# Patient Record
Sex: FEMALE | Race: WHITE | NOT HISPANIC OR LATINO | Employment: OTHER | URBAN - METROPOLITAN AREA
[De-identification: names, ages, dates, MRNs, and addresses within clinical notes are randomized per-mention and may not be internally consistent; named-entity substitution may affect disease eponyms.]

---

## 2017-01-17 ENCOUNTER — HOSPITAL ENCOUNTER (OUTPATIENT)
Dept: RADIOLOGY | Facility: HOSPITAL | Age: 72
Discharge: HOME | End: 2017-01-17
Payer: MEDICARE

## 2017-01-17 DIAGNOSIS — Z12.31 ENCOUNTER FOR SCREENING MAMMOGRAM FOR MALIGNANT NEOPLASM OF BREAST: ICD-10-CM

## 2017-01-17 PROCEDURE — G0202 SCR MAMMO BI INCL CAD: HCPCS

## 2017-01-31 ENCOUNTER — TRANSCRIBE ORDERS (OUTPATIENT)
Dept: ADMINISTRATIVE | Facility: HOSPITAL | Age: 72
End: 2017-01-31

## 2017-01-31 ENCOUNTER — LAB (OUTPATIENT)
Dept: LAB | Facility: HOSPITAL | Age: 72
End: 2017-01-31
Payer: MEDICARE

## 2017-01-31 DIAGNOSIS — I25.10 UNSPECIFIED CARDIOVASCULAR DISEASE: ICD-10-CM

## 2017-01-31 DIAGNOSIS — E78.5 HYPERLIPIDEMIA, UNSPECIFIED HYPERLIPIDEMIA TYPE: ICD-10-CM

## 2017-01-31 DIAGNOSIS — Z79.899 ENCOUNTER FOR LONG-TERM (CURRENT) USE OF OTHER MEDICATIONS: ICD-10-CM

## 2017-01-31 DIAGNOSIS — I25.10 UNSPECIFIED CARDIOVASCULAR DISEASE: Primary | ICD-10-CM

## 2017-01-31 LAB
ANION GAP SERPL CALCULATED.3IONS-SCNC: 7 MMOL/L (ref 4–13)
BUN SERPL-MCNC: 20 MG/DL (ref 5–25)
CALCIUM SERPL-MCNC: 8.6 MG/DL (ref 8.3–10.1)
CHLORIDE SERPL-SCNC: 105 MMOL/L (ref 100–108)
CHOLEST SERPL-MCNC: 121 MG/DL (ref 50–200)
CO2 SERPL-SCNC: 27 MMOL/L (ref 21–32)
CREAT SERPL-MCNC: 1.11 MG/DL (ref 0.6–1.3)
ERYTHROCYTE [DISTWIDTH] IN BLOOD BY AUTOMATED COUNT: 12.9 % (ref 11.6–15.1)
GFR SERPL CREATININE-BSD FRML MDRD: 48.5 ML/MIN/1.73SQ M
GLUCOSE SERPL-MCNC: 98 MG/DL (ref 65–140)
HCT VFR BLD AUTO: 45.5 % (ref 37–47)
HDLC SERPL-MCNC: 43 MG/DL (ref 40–60)
HGB BLD-MCNC: 14.8 G/DL (ref 12–16)
LDLC SERPL CALC-MCNC: 57 MG/DL (ref 0–100)
MCH RBC QN AUTO: 30.7 PG (ref 27–31)
MCHC RBC AUTO-ENTMCNC: 32.4 G/DL (ref 31.4–37.4)
MCV RBC AUTO: 95 FL (ref 82–98)
PLATELET # BLD AUTO: 180 THOUSANDS/UL (ref 130–400)
PMV BLD AUTO: 8.4 FL (ref 8.9–12.7)
POTASSIUM SERPL-SCNC: 4.1 MMOL/L (ref 3.5–5.3)
RBC # BLD AUTO: 4.8 MILLION/UL (ref 4.2–5.4)
SODIUM SERPL-SCNC: 139 MMOL/L (ref 136–145)
TRIGL SERPL-MCNC: 103 MG/DL
TSH SERPL DL<=0.05 MIU/L-ACNC: 3.4 UIU/ML (ref 0.36–3.74)
WBC # BLD AUTO: 5.4 THOUSAND/UL (ref 4.8–10.8)

## 2017-01-31 PROCEDURE — 80048 BASIC METABOLIC PNL TOTAL CA: CPT

## 2017-01-31 PROCEDURE — 84443 ASSAY THYROID STIM HORMONE: CPT

## 2017-01-31 PROCEDURE — 36415 COLL VENOUS BLD VENIPUNCTURE: CPT

## 2017-01-31 PROCEDURE — 85027 COMPLETE CBC AUTOMATED: CPT

## 2017-01-31 PROCEDURE — 80061 LIPID PANEL: CPT

## 2017-11-14 ENCOUNTER — APPOINTMENT (OUTPATIENT)
Dept: LAB | Facility: HOSPITAL | Age: 72
End: 2017-11-14
Attending: INTERNAL MEDICINE
Payer: MEDICARE

## 2017-11-14 ENCOUNTER — TRANSCRIBE ORDERS (OUTPATIENT)
Dept: ADMINISTRATIVE | Facility: HOSPITAL | Age: 72
End: 2017-11-14

## 2017-11-14 DIAGNOSIS — I25.10 DISEASE OF CARDIOVASCULAR SYSTEM: ICD-10-CM

## 2017-11-14 DIAGNOSIS — D64.9 ANEMIA, UNSPECIFIED TYPE: ICD-10-CM

## 2017-11-14 DIAGNOSIS — R35.0 URINARY FREQUENCY: ICD-10-CM

## 2017-11-14 DIAGNOSIS — Z79.899 ENCOUNTER FOR LONG-TERM (CURRENT) USE OF HIGH-RISK MEDICATION: ICD-10-CM

## 2017-11-14 DIAGNOSIS — E55.9 VITAMIN D DEFICIENCY DISEASE: ICD-10-CM

## 2017-11-14 DIAGNOSIS — I10 ESSENTIAL HYPERTENSION, MALIGNANT: Primary | ICD-10-CM

## 2017-11-14 DIAGNOSIS — I10 ESSENTIAL HYPERTENSION, MALIGNANT: ICD-10-CM

## 2017-11-14 DIAGNOSIS — E78.00 PURE HYPERCHOLESTEROLEMIA: ICD-10-CM

## 2017-11-14 LAB
25(OH)D3 SERPL-MCNC: 20.5 NG/ML (ref 30–100)
ALBUMIN SERPL BCP-MCNC: 3.4 G/DL (ref 3.5–5)
ALP SERPL-CCNC: 81 U/L (ref 46–116)
ALT SERPL W P-5'-P-CCNC: 23 U/L (ref 12–78)
ANION GAP SERPL CALCULATED.3IONS-SCNC: 6 MMOL/L (ref 4–13)
AST SERPL W P-5'-P-CCNC: 23 U/L (ref 5–45)
BACTERIA UR QL AUTO: ABNORMAL /HPF
BASOPHILS # BLD AUTO: 0 THOUSANDS/ΜL (ref 0–0.1)
BASOPHILS NFR BLD AUTO: 1 % (ref 0–1)
BILIRUB SERPL-MCNC: 0.5 MG/DL (ref 0.2–1)
BILIRUB UR QL STRIP: ABNORMAL
BUN SERPL-MCNC: 22 MG/DL (ref 5–25)
CALCIUM SERPL-MCNC: 8.7 MG/DL (ref 8.3–10.1)
CHLORIDE SERPL-SCNC: 101 MMOL/L (ref 100–108)
CHOLEST SERPL-MCNC: 147 MG/DL (ref 50–200)
CLARITY UR: ABNORMAL
CO2 SERPL-SCNC: 30 MMOL/L (ref 21–32)
COLOR UR: YELLOW
CREAT SERPL-MCNC: 1.44 MG/DL (ref 0.6–1.3)
EOSINOPHIL # BLD AUTO: 0.1 THOUSAND/ΜL (ref 0–0.61)
EOSINOPHIL NFR BLD AUTO: 2 % (ref 0–6)
ERYTHROCYTE [DISTWIDTH] IN BLOOD BY AUTOMATED COUNT: 13.7 % (ref 11.6–15.1)
ERYTHROCYTE [SEDIMENTATION RATE] IN BLOOD: 7 MM/HOUR (ref 2–25)
GFR SERPL CREATININE-BSD FRML MDRD: 36 ML/MIN/1.73SQ M
GLUCOSE P FAST SERPL-MCNC: 96 MG/DL (ref 65–99)
GLUCOSE UR STRIP-MCNC: NEGATIVE MG/DL
HCT VFR BLD AUTO: 46 % (ref 37–47)
HDLC SERPL-MCNC: 39 MG/DL (ref 40–60)
HGB BLD-MCNC: 15 G/DL (ref 12–16)
HGB UR QL STRIP.AUTO: ABNORMAL
HYALINE CASTS #/AREA URNS LPF: ABNORMAL /LPF
IRON SERPL-MCNC: 108 UG/DL (ref 50–170)
KETONES UR STRIP-MCNC: ABNORMAL MG/DL
LDLC SERPL CALC-MCNC: 80 MG/DL (ref 0–100)
LEUKOCYTE ESTERASE UR QL STRIP: ABNORMAL
LYMPHOCYTES # BLD AUTO: 1.1 THOUSANDS/ΜL (ref 0.6–4.47)
LYMPHOCYTES NFR BLD AUTO: 23 % (ref 14–44)
MAGNESIUM SERPL-MCNC: 2 MG/DL (ref 1.6–2.6)
MCH RBC QN AUTO: 32 PG (ref 27–31)
MCHC RBC AUTO-ENTMCNC: 32.6 G/DL (ref 31.4–37.4)
MCV RBC AUTO: 98 FL (ref 82–98)
MONOCYTES # BLD AUTO: 0.4 THOUSAND/ΜL (ref 0.17–1.22)
MONOCYTES NFR BLD AUTO: 8 % (ref 4–12)
MUCOUS THREADS UR QL AUTO: ABNORMAL
NEUTROPHILS # BLD AUTO: 3.2 THOUSANDS/ΜL (ref 1.85–7.62)
NEUTS SEG NFR BLD AUTO: 67 % (ref 43–75)
NITRITE UR QL STRIP: NEGATIVE
NON-SQ EPI CELLS URNS QL MICRO: ABNORMAL /HPF
NRBC BLD AUTO-RTO: 0 /100 WBCS
PH UR STRIP.AUTO: 5.5 [PH] (ref 5–9)
PLATELET # BLD AUTO: 166 THOUSANDS/UL (ref 130–400)
PMV BLD AUTO: 8.8 FL (ref 8.9–12.7)
POTASSIUM SERPL-SCNC: 3.6 MMOL/L (ref 3.5–5.3)
PROT SERPL-MCNC: 7.3 G/DL (ref 6.4–8.2)
PROT UR STRIP-MCNC: ABNORMAL MG/DL
RBC # BLD AUTO: 4.68 MILLION/UL (ref 4.2–5.4)
RBC #/AREA URNS AUTO: ABNORMAL /HPF
SODIUM SERPL-SCNC: 137 MMOL/L (ref 136–145)
SP GR UR STRIP.AUTO: 1.02 (ref 1–1.03)
TRIGL SERPL-MCNC: 140 MG/DL
TSH SERPL DL<=0.05 MIU/L-ACNC: 2.5 UIU/ML (ref 0.36–3.74)
URATE SERPL-MCNC: 7.1 MG/DL (ref 2–6.8)
UROBILINOGEN UR QL STRIP.AUTO: 1 E.U./DL
VIT B12 SERPL-MCNC: 458 PG/ML (ref 100–900)
WBC # BLD AUTO: 4.8 THOUSAND/UL (ref 4.8–10.8)
WBC #/AREA URNS AUTO: ABNORMAL /HPF

## 2017-11-14 PROCEDURE — 85652 RBC SED RATE AUTOMATED: CPT

## 2017-11-14 PROCEDURE — 85025 COMPLETE CBC W/AUTO DIFF WBC: CPT | Performed by: INTERNAL MEDICINE

## 2017-11-14 PROCEDURE — 36415 COLL VENOUS BLD VENIPUNCTURE: CPT | Performed by: INTERNAL MEDICINE

## 2017-11-14 PROCEDURE — 84550 ASSAY OF BLOOD/URIC ACID: CPT

## 2017-11-14 PROCEDURE — 82306 VITAMIN D 25 HYDROXY: CPT

## 2017-11-14 PROCEDURE — 87086 URINE CULTURE/COLONY COUNT: CPT

## 2017-11-14 PROCEDURE — 80061 LIPID PANEL: CPT | Performed by: INTERNAL MEDICINE

## 2017-11-14 PROCEDURE — 82607 VITAMIN B-12: CPT

## 2017-11-14 PROCEDURE — 80053 COMPREHEN METABOLIC PANEL: CPT | Performed by: INTERNAL MEDICINE

## 2017-11-14 PROCEDURE — 83735 ASSAY OF MAGNESIUM: CPT

## 2017-11-14 PROCEDURE — 83540 ASSAY OF IRON: CPT

## 2017-11-14 PROCEDURE — 84443 ASSAY THYROID STIM HORMONE: CPT

## 2017-11-14 PROCEDURE — 81001 URINALYSIS AUTO W/SCOPE: CPT | Performed by: INTERNAL MEDICINE

## 2017-11-15 LAB — BACTERIA UR CULT: NORMAL

## 2018-11-29 ENCOUNTER — APPOINTMENT (OUTPATIENT)
Dept: LAB | Facility: HOSPITAL | Age: 73
End: 2018-11-29
Attending: INTERNAL MEDICINE
Payer: MEDICARE

## 2018-11-29 ENCOUNTER — TRANSCRIBE ORDERS (OUTPATIENT)
Dept: ADMINISTRATIVE | Facility: HOSPITAL | Age: 73
End: 2018-11-29

## 2018-11-29 DIAGNOSIS — J44.9 OBSTRUCTIVE CHRONIC BRONCHITIS WITHOUT EXACERBATION (HCC): Primary | ICD-10-CM

## 2018-11-29 DIAGNOSIS — E78.5 HYPERLIPIDEMIA, UNSPECIFIED HYPERLIPIDEMIA TYPE: ICD-10-CM

## 2018-11-29 DIAGNOSIS — I10 ESSENTIAL HYPERTENSION, MALIGNANT: ICD-10-CM

## 2018-11-29 DIAGNOSIS — I25.10 ATHEROSCLEROSIS OF NATIVE CORONARY ARTERY OF NATIVE HEART WITHOUT ANGINA PECTORIS: ICD-10-CM

## 2018-11-29 DIAGNOSIS — J44.9 OBSTRUCTIVE CHRONIC BRONCHITIS WITHOUT EXACERBATION (HCC): ICD-10-CM

## 2018-11-29 LAB
25(OH)D3 SERPL-MCNC: 15.3 NG/ML (ref 30–100)
ALBUMIN SERPL BCP-MCNC: 3.4 G/DL (ref 3.5–5)
ALP SERPL-CCNC: 79 U/L (ref 46–116)
ALT SERPL W P-5'-P-CCNC: 24 U/L (ref 12–78)
ANION GAP SERPL CALCULATED.3IONS-SCNC: 7 MMOL/L (ref 4–13)
AST SERPL W P-5'-P-CCNC: 21 U/L (ref 5–45)
BASOPHILS # BLD AUTO: 0.04 THOUSANDS/ΜL (ref 0–0.1)
BASOPHILS NFR BLD AUTO: 1 % (ref 0–1)
BILIRUB SERPL-MCNC: 0.6 MG/DL (ref 0.2–1)
BUN SERPL-MCNC: 25 MG/DL (ref 5–25)
CALCIUM SERPL-MCNC: 9.2 MG/DL (ref 8.3–10.1)
CHLORIDE SERPL-SCNC: 104 MMOL/L (ref 100–108)
CHOLEST SERPL-MCNC: 155 MG/DL (ref 50–200)
CO2 SERPL-SCNC: 30 MMOL/L (ref 21–32)
CREAT SERPL-MCNC: 1.48 MG/DL (ref 0.6–1.3)
EOSINOPHIL # BLD AUTO: 0.16 THOUSAND/ΜL (ref 0–0.61)
EOSINOPHIL NFR BLD AUTO: 3 % (ref 0–6)
ERYTHROCYTE [DISTWIDTH] IN BLOOD BY AUTOMATED COUNT: 13.1 % (ref 11.6–15.1)
ERYTHROCYTE [SEDIMENTATION RATE] IN BLOOD: 7 MM/HOUR (ref 2–25)
GFR SERPL CREATININE-BSD FRML MDRD: 35 ML/MIN/1.73SQ M
GLUCOSE P FAST SERPL-MCNC: 98 MG/DL (ref 65–99)
HCT VFR BLD AUTO: 46.3 % (ref 34.8–46.1)
HDLC SERPL-MCNC: 42 MG/DL (ref 40–60)
HGB BLD-MCNC: 15.1 G/DL (ref 11.5–15.4)
IMM GRANULOCYTES # BLD AUTO: 0.02 THOUSAND/UL (ref 0–0.2)
IMM GRANULOCYTES NFR BLD AUTO: 0 % (ref 0–2)
IRON SERPL-MCNC: 137 UG/DL (ref 50–170)
LDLC SERPL CALC-MCNC: 79 MG/DL (ref 0–100)
LYMPHOCYTES # BLD AUTO: 1.19 THOUSANDS/ΜL (ref 0.6–4.47)
LYMPHOCYTES NFR BLD AUTO: 23 % (ref 14–44)
MAGNESIUM SERPL-MCNC: 2.1 MG/DL (ref 1.6–2.6)
MCH RBC QN AUTO: 31.9 PG (ref 26.8–34.3)
MCHC RBC AUTO-ENTMCNC: 32.6 G/DL (ref 31.4–37.4)
MCV RBC AUTO: 98 FL (ref 82–98)
MONOCYTES # BLD AUTO: 0.35 THOUSAND/ΜL (ref 0.17–1.22)
MONOCYTES NFR BLD AUTO: 7 % (ref 4–12)
NEUTROPHILS # BLD AUTO: 3.51 THOUSANDS/ΜL (ref 1.85–7.62)
NEUTS SEG NFR BLD AUTO: 66 % (ref 43–75)
NONHDLC SERPL-MCNC: 113 MG/DL
NRBC BLD AUTO-RTO: 0 /100 WBCS
PLATELET # BLD AUTO: 179 THOUSANDS/UL (ref 149–390)
PMV BLD AUTO: 10.9 FL (ref 8.9–12.7)
POTASSIUM SERPL-SCNC: 4 MMOL/L (ref 3.5–5.3)
PROT SERPL-MCNC: 7.3 G/DL (ref 6.4–8.2)
RBC # BLD AUTO: 4.74 MILLION/UL (ref 3.81–5.12)
SODIUM SERPL-SCNC: 141 MMOL/L (ref 136–145)
TRIGL SERPL-MCNC: 172 MG/DL
TSH SERPL DL<=0.05 MIU/L-ACNC: 2.25 UIU/ML (ref 0.36–3.74)
URATE SERPL-MCNC: 7.4 MG/DL (ref 2–6.8)
VIT B12 SERPL-MCNC: 426 PG/ML (ref 100–900)
WBC # BLD AUTO: 5.27 THOUSAND/UL (ref 4.31–10.16)

## 2018-11-29 PROCEDURE — 36415 COLL VENOUS BLD VENIPUNCTURE: CPT

## 2018-11-29 PROCEDURE — 83540 ASSAY OF IRON: CPT

## 2018-11-29 PROCEDURE — 84443 ASSAY THYROID STIM HORMONE: CPT

## 2018-11-29 PROCEDURE — 82306 VITAMIN D 25 HYDROXY: CPT

## 2018-11-29 PROCEDURE — 83735 ASSAY OF MAGNESIUM: CPT

## 2018-11-29 PROCEDURE — 80053 COMPREHEN METABOLIC PANEL: CPT

## 2018-11-29 PROCEDURE — 82607 VITAMIN B-12: CPT

## 2018-11-29 PROCEDURE — 84550 ASSAY OF BLOOD/URIC ACID: CPT

## 2018-11-29 PROCEDURE — 85025 COMPLETE CBC W/AUTO DIFF WBC: CPT

## 2018-11-29 PROCEDURE — 85652 RBC SED RATE AUTOMATED: CPT

## 2018-11-29 PROCEDURE — 80061 LIPID PANEL: CPT

## 2020-04-27 ENCOUNTER — HOSPITAL ENCOUNTER (OUTPATIENT)
Facility: HOSPITAL | Age: 75
Setting detail: OBSERVATION
Discharge: HOME | End: 2020-04-28
Attending: EMERGENCY MEDICINE | Admitting: INTERNAL MEDICINE
Payer: MEDICARE

## 2020-04-27 ENCOUNTER — APPOINTMENT (OUTPATIENT)
Dept: RADIOLOGY | Facility: HOSPITAL | Age: 75
End: 2020-04-27
Payer: MEDICARE

## 2020-04-27 ENCOUNTER — APPOINTMENT (EMERGENCY)
Dept: RADIOLOGY | Facility: HOSPITAL | Age: 75
End: 2020-04-27
Payer: MEDICARE

## 2020-04-27 DIAGNOSIS — N17.9 ACUTE KIDNEY INJURY (HCC): ICD-10-CM

## 2020-04-27 DIAGNOSIS — R68.84 JAW PAIN: Primary | ICD-10-CM

## 2020-04-27 PROBLEM — R11.0 NAUSEA: Status: ACTIVE | Noted: 2020-04-27

## 2020-04-27 LAB
ALBUMIN SERPL BCP-MCNC: 3.5 G/DL (ref 3.5–5)
ALP SERPL-CCNC: 63 U/L (ref 46–116)
ALT SERPL W P-5'-P-CCNC: 17 U/L (ref 12–78)
ANION GAP SERPL CALCULATED.3IONS-SCNC: 9 MMOL/L (ref 4–13)
APTT PPP: 27 SECONDS (ref 23–37)
AST SERPL W P-5'-P-CCNC: 20 U/L (ref 5–45)
BASOPHILS # BLD AUTO: 0.03 THOUSANDS/ΜL (ref 0–0.1)
BASOPHILS NFR BLD AUTO: 1 % (ref 0–1)
BILIRUB SERPL-MCNC: 0.4 MG/DL (ref 0.2–1)
BUN SERPL-MCNC: 36 MG/DL (ref 5–25)
CALCIUM SERPL-MCNC: 9.2 MG/DL (ref 8.3–10.1)
CHLORIDE SERPL-SCNC: 103 MMOL/L (ref 100–108)
CO2 SERPL-SCNC: 27 MMOL/L (ref 21–32)
CREAT SERPL-MCNC: 2.06 MG/DL (ref 0.6–1.3)
EOSINOPHIL # BLD AUTO: 0.12 THOUSAND/ΜL (ref 0–0.61)
EOSINOPHIL NFR BLD AUTO: 3 % (ref 0–6)
ERYTHROCYTE [DISTWIDTH] IN BLOOD BY AUTOMATED COUNT: 13.2 % (ref 11.6–15.1)
GFR SERPL CREATININE-BSD FRML MDRD: 23 ML/MIN/1.73SQ M
GLUCOSE SERPL-MCNC: 112 MG/DL (ref 65–140)
HCT VFR BLD AUTO: 43.5 % (ref 34.8–46.1)
HGB BLD-MCNC: 14.3 G/DL (ref 11.5–15.4)
IMM GRANULOCYTES # BLD AUTO: 0.01 THOUSAND/UL (ref 0–0.2)
IMM GRANULOCYTES NFR BLD AUTO: 0 % (ref 0–2)
INR PPP: 1 (ref 0.84–1.19)
LIPASE SERPL-CCNC: 214 U/L (ref 73–393)
LYMPHOCYTES # BLD AUTO: 0.93 THOUSANDS/ΜL (ref 0.6–4.47)
LYMPHOCYTES NFR BLD AUTO: 19 % (ref 14–44)
MCH RBC QN AUTO: 32 PG (ref 26.8–34.3)
MCHC RBC AUTO-ENTMCNC: 32.9 G/DL (ref 31.4–37.4)
MCV RBC AUTO: 97 FL (ref 82–98)
MONOCYTES # BLD AUTO: 0.35 THOUSAND/ΜL (ref 0.17–1.22)
MONOCYTES NFR BLD AUTO: 7 % (ref 4–12)
NEUTROPHILS # BLD AUTO: 3.41 THOUSANDS/ΜL (ref 1.85–7.62)
NEUTS SEG NFR BLD AUTO: 70 % (ref 43–75)
NRBC BLD AUTO-RTO: 0 /100 WBCS
PLATELET # BLD AUTO: 158 THOUSANDS/UL (ref 149–390)
PLATELET # BLD AUTO: 171 THOUSANDS/UL (ref 149–390)
PMV BLD AUTO: 10.4 FL (ref 8.9–12.7)
PMV BLD AUTO: 10.4 FL (ref 8.9–12.7)
POTASSIUM SERPL-SCNC: 3.7 MMOL/L (ref 3.5–5.3)
PROT SERPL-MCNC: 6.7 G/DL (ref 6.4–8.2)
PROTHROMBIN TIME: 13.5 SECONDS (ref 11.6–14.5)
RBC # BLD AUTO: 4.47 MILLION/UL (ref 3.81–5.12)
SODIUM SERPL-SCNC: 139 MMOL/L (ref 136–145)
TROPONIN I SERPL-MCNC: <0.02 NG/ML
WBC # BLD AUTO: 4.85 THOUSAND/UL (ref 4.31–10.16)

## 2020-04-27 PROCEDURE — 81001 URINALYSIS AUTO W/SCOPE: CPT | Performed by: INTERNAL MEDICINE

## 2020-04-27 PROCEDURE — 85049 AUTOMATED PLATELET COUNT: CPT | Performed by: INTERNAL MEDICINE

## 2020-04-27 PROCEDURE — 84484 ASSAY OF TROPONIN QUANT: CPT | Performed by: PHYSICIAN ASSISTANT

## 2020-04-27 PROCEDURE — 99285 EMERGENCY DEPT VISIT HI MDM: CPT

## 2020-04-27 PROCEDURE — 1124F ACP DISCUSS-NO DSCNMKR DOCD: CPT | Performed by: INTERNAL MEDICINE

## 2020-04-27 PROCEDURE — 99220 PR INITIAL OBSERVATION CARE/DAY 70 MINUTES: CPT | Performed by: INTERNAL MEDICINE

## 2020-04-27 PROCEDURE — 85730 THROMBOPLASTIN TIME PARTIAL: CPT | Performed by: PHYSICIAN ASSISTANT

## 2020-04-27 PROCEDURE — 93005 ELECTROCARDIOGRAM TRACING: CPT

## 2020-04-27 PROCEDURE — 36415 COLL VENOUS BLD VENIPUNCTURE: CPT | Performed by: PHYSICIAN ASSISTANT

## 2020-04-27 PROCEDURE — 96360 HYDRATION IV INFUSION INIT: CPT

## 2020-04-27 PROCEDURE — 84484 ASSAY OF TROPONIN QUANT: CPT | Performed by: INTERNAL MEDICINE

## 2020-04-27 PROCEDURE — 85025 COMPLETE CBC W/AUTO DIFF WBC: CPT | Performed by: PHYSICIAN ASSISTANT

## 2020-04-27 PROCEDURE — 99285 EMERGENCY DEPT VISIT HI MDM: CPT | Performed by: PHYSICIAN ASSISTANT

## 2020-04-27 PROCEDURE — 71045 X-RAY EXAM CHEST 1 VIEW: CPT

## 2020-04-27 PROCEDURE — 70450 CT HEAD/BRAIN W/O DYE: CPT

## 2020-04-27 PROCEDURE — 85610 PROTHROMBIN TIME: CPT | Performed by: PHYSICIAN ASSISTANT

## 2020-04-27 PROCEDURE — 80053 COMPREHEN METABOLIC PANEL: CPT | Performed by: PHYSICIAN ASSISTANT

## 2020-04-27 PROCEDURE — 83690 ASSAY OF LIPASE: CPT | Performed by: PHYSICIAN ASSISTANT

## 2020-04-27 RX ORDER — ASPIRIN 81 MG/1
162 TABLET, CHEWABLE ORAL DAILY
Status: DISCONTINUED | OUTPATIENT
Start: 2020-04-28 | End: 2020-04-28 | Stop reason: HOSPADM

## 2020-04-27 RX ORDER — ASPIRIN 81 MG/1
162 TABLET, CHEWABLE ORAL DAILY
COMMUNITY

## 2020-04-27 RX ORDER — ATORVASTATIN CALCIUM 10 MG/1
10 TABLET, FILM COATED ORAL
Status: DISCONTINUED | OUTPATIENT
Start: 2020-04-27 | End: 2020-04-28 | Stop reason: HOSPADM

## 2020-04-27 RX ORDER — CLOPIDOGREL BISULFATE 75 MG/1
75 TABLET ORAL DAILY
Status: DISCONTINUED | OUTPATIENT
Start: 2020-04-28 | End: 2020-04-28 | Stop reason: HOSPADM

## 2020-04-27 RX ORDER — SODIUM CHLORIDE 9 MG/ML
75 INJECTION, SOLUTION INTRAVENOUS CONTINUOUS
Status: DISCONTINUED | OUTPATIENT
Start: 2020-04-27 | End: 2020-04-28 | Stop reason: HOSPADM

## 2020-04-27 RX ORDER — ONDANSETRON 2 MG/ML
4 INJECTION INTRAMUSCULAR; INTRAVENOUS EVERY 6 HOURS PRN
Status: DISCONTINUED | OUTPATIENT
Start: 2020-04-27 | End: 2020-04-28 | Stop reason: HOSPADM

## 2020-04-27 RX ORDER — HEPARIN SODIUM 5000 [USP'U]/ML
5000 INJECTION, SOLUTION INTRAVENOUS; SUBCUTANEOUS EVERY 8 HOURS SCHEDULED
Status: DISCONTINUED | OUTPATIENT
Start: 2020-04-27 | End: 2020-04-28 | Stop reason: HOSPADM

## 2020-04-27 RX ORDER — CLOPIDOGREL BISULFATE 75 MG/1
75 TABLET ORAL DAILY
COMMUNITY
End: 2021-05-07

## 2020-04-27 RX ORDER — ACETAMINOPHEN 325 MG/1
650 TABLET ORAL EVERY 6 HOURS PRN
Status: DISCONTINUED | OUTPATIENT
Start: 2020-04-27 | End: 2020-04-28 | Stop reason: HOSPADM

## 2020-04-27 RX ORDER — PANTOPRAZOLE SODIUM 40 MG/1
40 TABLET, DELAYED RELEASE ORAL
Status: DISCONTINUED | OUTPATIENT
Start: 2020-04-28 | End: 2020-04-27

## 2020-04-27 RX ORDER — ATORVASTATIN CALCIUM 10 MG/1
10 TABLET, FILM COATED ORAL DAILY
COMMUNITY

## 2020-04-27 RX ORDER — LISINOPRIL AND HYDROCHLOROTHIAZIDE 20; 12.5 MG/1; MG/1
1 TABLET ORAL DAILY
COMMUNITY
End: 2021-05-07

## 2020-04-27 RX ORDER — PANTOPRAZOLE SODIUM 40 MG/1
40 TABLET, DELAYED RELEASE ORAL
Status: DISCONTINUED | OUTPATIENT
Start: 2020-04-27 | End: 2020-04-28 | Stop reason: HOSPADM

## 2020-04-27 RX ADMIN — HEPARIN SODIUM 5000 UNITS: 5000 INJECTION INTRAVENOUS; SUBCUTANEOUS at 23:31

## 2020-04-27 RX ADMIN — ATORVASTATIN CALCIUM 10 MG: 10 TABLET, FILM COATED ORAL at 18:09

## 2020-04-27 RX ADMIN — PANTOPRAZOLE SODIUM 40 MG: 40 TABLET, DELAYED RELEASE ORAL at 18:09

## 2020-04-27 RX ADMIN — SODIUM CHLORIDE 500 ML: 0.9 INJECTION, SOLUTION INTRAVENOUS at 10:31

## 2020-04-27 RX ADMIN — METOPROLOL TARTRATE 25 MG: 25 TABLET, FILM COATED ORAL at 18:09

## 2020-04-27 RX ADMIN — SODIUM CHLORIDE 75 ML/HR: 0.9 INJECTION, SOLUTION INTRAVENOUS at 15:24

## 2020-04-28 ENCOUNTER — APPOINTMENT (OUTPATIENT)
Dept: RADIOLOGY | Facility: HOSPITAL | Age: 75
End: 2020-04-28
Payer: MEDICARE

## 2020-04-28 VITALS
WEIGHT: 164 LBS | DIASTOLIC BLOOD PRESSURE: 55 MMHG | RESPIRATION RATE: 18 BRPM | BODY MASS INDEX: 24.86 KG/M2 | TEMPERATURE: 97.5 F | HEIGHT: 68 IN | OXYGEN SATURATION: 98 % | HEART RATE: 75 BPM | SYSTOLIC BLOOD PRESSURE: 106 MMHG

## 2020-04-28 PROBLEM — R68.84 JAW PAIN: Status: RESOLVED | Noted: 2020-04-27 | Resolved: 2020-04-28

## 2020-04-28 LAB
ANION GAP SERPL CALCULATED.3IONS-SCNC: 10 MMOL/L (ref 4–13)
BACTERIA UR QL AUTO: ABNORMAL /HPF
BILIRUB UR QL STRIP: NEGATIVE
BUN SERPL-MCNC: 32 MG/DL (ref 5–25)
CALCIUM SERPL-MCNC: 8.9 MG/DL (ref 8.3–10.1)
CHLORIDE SERPL-SCNC: 107 MMOL/L (ref 100–108)
CLARITY UR: CLEAR
CO2 SERPL-SCNC: 26 MMOL/L (ref 21–32)
COLOR UR: YELLOW
CREAT SERPL-MCNC: 1.76 MG/DL (ref 0.6–1.3)
ERYTHROCYTE [DISTWIDTH] IN BLOOD BY AUTOMATED COUNT: 13.4 % (ref 11.6–15.1)
GFR SERPL CREATININE-BSD FRML MDRD: 28 ML/MIN/1.73SQ M
GLUCOSE P FAST SERPL-MCNC: 75 MG/DL (ref 65–99)
GLUCOSE SERPL-MCNC: 75 MG/DL (ref 65–140)
GLUCOSE UR STRIP-MCNC: NEGATIVE MG/DL
HCT VFR BLD AUTO: 41.6 % (ref 34.8–46.1)
HGB BLD-MCNC: 13.6 G/DL (ref 11.5–15.4)
HGB UR QL STRIP.AUTO: ABNORMAL
KETONES UR STRIP-MCNC: NEGATIVE MG/DL
LEUKOCYTE ESTERASE UR QL STRIP: NEGATIVE
MCH RBC QN AUTO: 32.3 PG (ref 26.8–34.3)
MCHC RBC AUTO-ENTMCNC: 32.7 G/DL (ref 31.4–37.4)
MCV RBC AUTO: 99 FL (ref 82–98)
NITRITE UR QL STRIP: NEGATIVE
NON-SQ EPI CELLS URNS QL MICRO: ABNORMAL /HPF
PH UR STRIP.AUTO: 5.5 [PH]
PLATELET # BLD AUTO: 162 THOUSANDS/UL (ref 149–390)
PMV BLD AUTO: 10.8 FL (ref 8.9–12.7)
POTASSIUM SERPL-SCNC: 3.9 MMOL/L (ref 3.5–5.3)
PROT UR STRIP-MCNC: NEGATIVE MG/DL
RBC # BLD AUTO: 4.21 MILLION/UL (ref 3.81–5.12)
RBC #/AREA URNS AUTO: ABNORMAL /HPF
SODIUM SERPL-SCNC: 143 MMOL/L (ref 136–145)
SP GR UR STRIP.AUTO: 1.02 (ref 1–1.03)
TSH SERPL DL<=0.05 MIU/L-ACNC: 1.67 UIU/ML (ref 0.36–3.74)
UROBILINOGEN UR QL STRIP.AUTO: 0.2 E.U./DL
VIT B12 SERPL-MCNC: 390 PG/ML (ref 100–900)
WBC # BLD AUTO: 5.33 THOUSAND/UL (ref 4.31–10.16)
WBC #/AREA URNS AUTO: ABNORMAL /HPF

## 2020-04-28 PROCEDURE — 84443 ASSAY THYROID STIM HORMONE: CPT | Performed by: INTERNAL MEDICINE

## 2020-04-28 PROCEDURE — 97161 PT EVAL LOW COMPLEX 20 MIN: CPT

## 2020-04-28 PROCEDURE — 80048 BASIC METABOLIC PNL TOTAL CA: CPT | Performed by: INTERNAL MEDICINE

## 2020-04-28 PROCEDURE — 82607 VITAMIN B-12: CPT | Performed by: INTERNAL MEDICINE

## 2020-04-28 PROCEDURE — 99217 PR OBSERVATION CARE DISCHARGE MANAGEMENT: CPT | Performed by: STUDENT IN AN ORGANIZED HEALTH CARE EDUCATION/TRAINING PROGRAM

## 2020-04-28 PROCEDURE — 93880 EXTRACRANIAL BILAT STUDY: CPT

## 2020-04-28 PROCEDURE — 93880 EXTRACRANIAL BILAT STUDY: CPT | Performed by: SURGERY

## 2020-04-28 PROCEDURE — 76770 US EXAM ABDO BACK WALL COMP: CPT

## 2020-04-28 PROCEDURE — 85027 COMPLETE CBC AUTOMATED: CPT | Performed by: INTERNAL MEDICINE

## 2020-04-28 RX ADMIN — CLOPIDOGREL BISULFATE 75 MG: 75 TABLET ORAL at 08:58

## 2020-04-28 RX ADMIN — SODIUM CHLORIDE 75 ML/HR: 0.9 INJECTION, SOLUTION INTRAVENOUS at 04:22

## 2020-04-28 RX ADMIN — ASPIRIN 81 MG 162 MG: 81 TABLET ORAL at 08:58

## 2020-04-28 RX ADMIN — PANTOPRAZOLE SODIUM 40 MG: 40 TABLET, DELAYED RELEASE ORAL at 05:38

## 2020-04-28 RX ADMIN — HEPARIN SODIUM 5000 UNITS: 5000 INJECTION INTRAVENOUS; SUBCUTANEOUS at 14:15

## 2020-04-28 RX ADMIN — HEPARIN SODIUM 5000 UNITS: 5000 INJECTION INTRAVENOUS; SUBCUTANEOUS at 05:38

## 2020-04-28 RX ADMIN — METOPROLOL TARTRATE 25 MG: 25 TABLET, FILM COATED ORAL at 08:58

## 2020-04-29 ENCOUNTER — TELEMEDICINE (OUTPATIENT)
Dept: GASTROENTEROLOGY | Facility: CLINIC | Age: 75
End: 2020-04-29
Payer: MEDICARE

## 2020-04-29 DIAGNOSIS — R11.0 NAUSEA: ICD-10-CM

## 2020-04-29 DIAGNOSIS — R10.13 DYSPEPSIA: Primary | ICD-10-CM

## 2020-04-29 DIAGNOSIS — R14.2 BELCHING: ICD-10-CM

## 2020-04-29 DIAGNOSIS — Z12.11 COLON CANCER SCREENING: ICD-10-CM

## 2020-04-29 LAB
ATRIAL RATE: 62 BPM
P AXIS: 64 DEGREES
PR INTERVAL: 156 MS
QRS AXIS: 46 DEGREES
QRSD INTERVAL: 92 MS
QT INTERVAL: 444 MS
QTC INTERVAL: 450 MS
T WAVE AXIS: 143 DEGREES
VENTRICULAR RATE: 62 BPM

## 2020-04-29 PROCEDURE — 93010 ELECTROCARDIOGRAM REPORT: CPT | Performed by: INTERNAL MEDICINE

## 2020-04-29 PROCEDURE — 99202 OFFICE O/P NEW SF 15 MIN: CPT | Performed by: INTERNAL MEDICINE

## 2020-04-29 RX ORDER — PANTOPRAZOLE SODIUM 40 MG/1
40 TABLET, DELAYED RELEASE ORAL DAILY
Qty: 90 TABLET | Refills: 0 | Status: SHIPPED | OUTPATIENT
Start: 2020-04-29 | End: 2020-07-26 | Stop reason: SDUPTHER

## 2020-04-30 ENCOUNTER — TELEPHONE (OUTPATIENT)
Dept: GASTROENTEROLOGY | Facility: CLINIC | Age: 75
End: 2020-04-30

## 2020-05-22 ENCOUNTER — TELEPHONE (OUTPATIENT)
Dept: GASTROENTEROLOGY | Facility: AMBULARY SURGERY CENTER | Age: 75
End: 2020-05-22

## 2020-07-26 DIAGNOSIS — R14.2 BELCHING: ICD-10-CM

## 2020-07-27 ENCOUNTER — TRANSCRIBE ORDERS (OUTPATIENT)
Dept: ADMINISTRATIVE | Facility: HOSPITAL | Age: 75
End: 2020-07-27

## 2020-07-27 DIAGNOSIS — I73.9 PERIPHERAL VASCULAR DISEASE, UNSPECIFIED (HCC): Primary | ICD-10-CM

## 2020-07-27 DIAGNOSIS — I25.10 ATHEROSCLEROSIS OF NATIVE CORONARY ARTERY WITHOUT ANGINA PECTORIS, UNSPECIFIED WHETHER NATIVE OR TRANSPLANTED HEART: ICD-10-CM

## 2020-07-27 DIAGNOSIS — K21.9 GASTROESOPHAGEAL REFLUX DISEASE WITHOUT ESOPHAGITIS: ICD-10-CM

## 2020-07-27 DIAGNOSIS — Z00.00 ROUTINE GENERAL MEDICAL EXAMINATION AT A HEALTH CARE FACILITY: ICD-10-CM

## 2020-07-27 DIAGNOSIS — E78.5 HYPERLIPIDEMIA, UNSPECIFIED HYPERLIPIDEMIA TYPE: ICD-10-CM

## 2020-07-27 DIAGNOSIS — I10 ESSENTIAL HYPERTENSION, MALIGNANT: ICD-10-CM

## 2020-07-27 RX ORDER — PANTOPRAZOLE SODIUM 40 MG/1
40 TABLET, DELAYED RELEASE ORAL DAILY
Qty: 90 TABLET | Refills: 0 | Status: SHIPPED | OUTPATIENT
Start: 2020-07-27 | End: 2020-10-26 | Stop reason: SDUPTHER

## 2020-08-04 ENCOUNTER — HOSPITAL ENCOUNTER (OUTPATIENT)
Dept: RADIOLOGY | Facility: HOSPITAL | Age: 75
Discharge: HOME | End: 2020-08-04
Attending: INTERNAL MEDICINE
Payer: MEDICARE

## 2020-08-04 ENCOUNTER — APPOINTMENT (OUTPATIENT)
Dept: LAB | Facility: HOSPITAL | Age: 75
End: 2020-08-04
Attending: INTERNAL MEDICINE
Payer: MEDICARE

## 2020-08-04 DIAGNOSIS — Z00.00 ROUTINE GENERAL MEDICAL EXAMINATION AT A HEALTH CARE FACILITY: ICD-10-CM

## 2020-08-04 DIAGNOSIS — I73.9 PERIPHERAL VASCULAR DISEASE, UNSPECIFIED (HCC): ICD-10-CM

## 2020-08-04 DIAGNOSIS — I10 ESSENTIAL HYPERTENSION, MALIGNANT: ICD-10-CM

## 2020-08-04 DIAGNOSIS — E78.5 HYPERLIPIDEMIA, UNSPECIFIED HYPERLIPIDEMIA TYPE: ICD-10-CM

## 2020-08-04 DIAGNOSIS — I25.10 ATHEROSCLEROSIS OF NATIVE CORONARY ARTERY WITHOUT ANGINA PECTORIS, UNSPECIFIED WHETHER NATIVE OR TRANSPLANTED HEART: ICD-10-CM

## 2020-08-04 DIAGNOSIS — K21.9 GASTROESOPHAGEAL REFLUX DISEASE WITHOUT ESOPHAGITIS: ICD-10-CM

## 2020-08-04 LAB
ALBUMIN SERPL BCP-MCNC: 3.4 G/DL (ref 3.5–5)
ALP SERPL-CCNC: 63 U/L (ref 46–116)
ALT SERPL W P-5'-P-CCNC: 20 U/L (ref 12–78)
ANION GAP SERPL CALCULATED.3IONS-SCNC: 7 MMOL/L (ref 4–13)
AST SERPL W P-5'-P-CCNC: 22 U/L (ref 5–45)
BACTERIA UR QL AUTO: ABNORMAL /HPF
BASOPHILS # BLD AUTO: 0.05 THOUSANDS/ΜL (ref 0–0.1)
BASOPHILS NFR BLD AUTO: 1 % (ref 0–1)
BILIRUB SERPL-MCNC: 0.5 MG/DL (ref 0.2–1)
BILIRUB UR QL STRIP: ABNORMAL
BUN SERPL-MCNC: 33 MG/DL (ref 5–25)
CALCIUM SERPL-MCNC: 8.9 MG/DL (ref 8.3–10.1)
CHLORIDE SERPL-SCNC: 104 MMOL/L (ref 100–108)
CHOLEST SERPL-MCNC: 129 MG/DL (ref 50–200)
CLARITY UR: ABNORMAL
CO2 SERPL-SCNC: 28 MMOL/L (ref 21–32)
COLOR UR: ABNORMAL
CREAT SERPL-MCNC: 2.1 MG/DL (ref 0.6–1.3)
EOSINOPHIL # BLD AUTO: 0.1 THOUSAND/ΜL (ref 0–0.61)
EOSINOPHIL NFR BLD AUTO: 2 % (ref 0–6)
ERYTHROCYTE [DISTWIDTH] IN BLOOD BY AUTOMATED COUNT: 13.4 % (ref 11.6–15.1)
ERYTHROCYTE [SEDIMENTATION RATE] IN BLOOD: 2 MM/HOUR (ref 0–29)
GFR SERPL CREATININE-BSD FRML MDRD: 23 ML/MIN/1.73SQ M
GLUCOSE P FAST SERPL-MCNC: 89 MG/DL (ref 65–99)
GLUCOSE UR STRIP-MCNC: NEGATIVE MG/DL
HCT VFR BLD AUTO: 41.2 % (ref 34.8–46.1)
HDLC SERPL-MCNC: 41 MG/DL
HGB BLD-MCNC: 13.4 G/DL (ref 11.5–15.4)
HGB UR QL STRIP.AUTO: NEGATIVE
HYALINE CASTS #/AREA URNS LPF: ABNORMAL /LPF
IMM GRANULOCYTES # BLD AUTO: 0.01 THOUSAND/UL (ref 0–0.2)
IMM GRANULOCYTES NFR BLD AUTO: 0 % (ref 0–2)
IRON SERPL-MCNC: 122 UG/DL (ref 50–170)
KETONES UR STRIP-MCNC: ABNORMAL MG/DL
LDLC SERPL CALC-MCNC: 63 MG/DL (ref 0–100)
LEUKOCYTE ESTERASE UR QL STRIP: ABNORMAL
LYMPHOCYTES # BLD AUTO: 1.23 THOUSANDS/ΜL (ref 0.6–4.47)
LYMPHOCYTES NFR BLD AUTO: 24 % (ref 14–44)
MAGNESIUM SERPL-MCNC: 2 MG/DL (ref 1.6–2.6)
MCH RBC QN AUTO: 32.6 PG (ref 26.8–34.3)
MCHC RBC AUTO-ENTMCNC: 32.5 G/DL (ref 31.4–37.4)
MCV RBC AUTO: 100 FL (ref 82–98)
MONOCYTES # BLD AUTO: 0.35 THOUSAND/ΜL (ref 0.17–1.22)
MONOCYTES NFR BLD AUTO: 7 % (ref 4–12)
NEUTROPHILS # BLD AUTO: 3.36 THOUSANDS/ΜL (ref 1.85–7.62)
NEUTS SEG NFR BLD AUTO: 66 % (ref 43–75)
NITRITE UR QL STRIP: NEGATIVE
NON-SQ EPI CELLS URNS QL MICRO: ABNORMAL /HPF
NONHDLC SERPL-MCNC: 88 MG/DL
NRBC BLD AUTO-RTO: 0 /100 WBCS
NT-PROBNP SERPL-MCNC: 4369 PG/ML
PH UR STRIP.AUTO: 5.5 [PH]
PLATELET # BLD AUTO: 152 THOUSANDS/UL (ref 149–390)
PMV BLD AUTO: 11.7 FL (ref 8.9–12.7)
POTASSIUM SERPL-SCNC: 4 MMOL/L (ref 3.5–5.3)
PROT SERPL-MCNC: 6.7 G/DL (ref 6.4–8.2)
PROT UR STRIP-MCNC: ABNORMAL MG/DL
RBC # BLD AUTO: 4.11 MILLION/UL (ref 3.81–5.12)
RBC #/AREA URNS AUTO: ABNORMAL /HPF
SODIUM SERPL-SCNC: 139 MMOL/L (ref 136–145)
SP GR UR STRIP.AUTO: 1.02 (ref 1–1.03)
TRIGL SERPL-MCNC: 124 MG/DL
TSH SERPL DL<=0.05 MIU/L-ACNC: 2.69 UIU/ML (ref 0.36–3.74)
URATE SERPL-MCNC: 9.8 MG/DL (ref 2–6.8)
UROBILINOGEN UR QL STRIP.AUTO: 0.2 E.U./DL
VIT B12 SERPL-MCNC: 412 PG/ML (ref 100–900)
WBC # BLD AUTO: 5.1 THOUSAND/UL (ref 4.31–10.16)
WBC #/AREA URNS AUTO: ABNORMAL /HPF

## 2020-08-04 PROCEDURE — 93922 UPR/L XTREMITY ART 2 LEVELS: CPT

## 2020-08-04 PROCEDURE — 81001 URINALYSIS AUTO W/SCOPE: CPT

## 2020-08-04 PROCEDURE — 85025 COMPLETE CBC W/AUTO DIFF WBC: CPT

## 2020-08-04 PROCEDURE — 83880 ASSAY OF NATRIURETIC PEPTIDE: CPT

## 2020-08-04 PROCEDURE — 36415 COLL VENOUS BLD VENIPUNCTURE: CPT

## 2020-08-04 PROCEDURE — 85652 RBC SED RATE AUTOMATED: CPT

## 2020-08-04 PROCEDURE — 80061 LIPID PANEL: CPT

## 2020-08-04 PROCEDURE — 84550 ASSAY OF BLOOD/URIC ACID: CPT

## 2020-08-04 PROCEDURE — 80053 COMPREHEN METABOLIC PANEL: CPT

## 2020-08-04 PROCEDURE — 83735 ASSAY OF MAGNESIUM: CPT

## 2020-08-04 PROCEDURE — 84443 ASSAY THYROID STIM HORMONE: CPT

## 2020-08-04 PROCEDURE — 82607 VITAMIN B-12: CPT

## 2020-08-04 PROCEDURE — 83540 ASSAY OF IRON: CPT

## 2020-08-05 PROCEDURE — 93922 UPR/L XTREMITY ART 2 LEVELS: CPT | Performed by: SURGERY

## 2020-10-26 DIAGNOSIS — R14.2 BELCHING: ICD-10-CM

## 2020-10-26 RX ORDER — PANTOPRAZOLE SODIUM 40 MG/1
40 TABLET, DELAYED RELEASE ORAL DAILY
Qty: 90 TABLET | Refills: 0 | Status: SHIPPED | OUTPATIENT
Start: 2020-10-26 | End: 2021-05-07

## 2020-11-06 ENCOUNTER — LAB (OUTPATIENT)
Dept: LAB | Facility: CLINIC | Age: 75
End: 2020-11-06
Payer: MEDICARE

## 2020-11-06 ENCOUNTER — TRANSCRIBE ORDERS (OUTPATIENT)
Dept: ADMINISTRATIVE | Facility: HOSPITAL | Age: 75
End: 2020-11-06

## 2020-11-06 ENCOUNTER — TRANSCRIBE ORDERS (OUTPATIENT)
Dept: LAB | Facility: CLINIC | Age: 75
End: 2020-11-06

## 2020-11-06 DIAGNOSIS — R63.4 LOSS OF WEIGHT: ICD-10-CM

## 2020-11-06 DIAGNOSIS — R00.2 PALPITATIONS: Primary | ICD-10-CM

## 2020-11-06 DIAGNOSIS — R00.2 PALPITATIONS: ICD-10-CM

## 2020-11-06 DIAGNOSIS — R41.3 MEMORY LOSS: ICD-10-CM

## 2020-11-06 DIAGNOSIS — R07.9 CHEST PAIN, UNSPECIFIED TYPE: ICD-10-CM

## 2020-11-06 DIAGNOSIS — R63.4 ABNORMAL WEIGHT LOSS: ICD-10-CM

## 2020-11-06 DIAGNOSIS — F17.200 TOBACCO USE DISORDER: ICD-10-CM

## 2020-11-06 DIAGNOSIS — R06.02 SHORTNESS OF BREATH: ICD-10-CM

## 2020-11-06 DIAGNOSIS — I10 ESSENTIAL HYPERTENSION, MALIGNANT: ICD-10-CM

## 2020-11-06 DIAGNOSIS — R06.02 SOB (SHORTNESS OF BREATH): Primary | ICD-10-CM

## 2020-11-06 DIAGNOSIS — N18.30 STAGE 3 CHRONIC KIDNEY DISEASE, UNSPECIFIED WHETHER STAGE 3A OR 3B CKD (HCC): ICD-10-CM

## 2020-11-06 DIAGNOSIS — R06.02 SOB (SHORTNESS OF BREATH): ICD-10-CM

## 2020-11-06 LAB
ALBUMIN SERPL BCP-MCNC: 3.9 G/DL (ref 3.5–5)
ALP SERPL-CCNC: 73 U/L (ref 46–116)
ALT SERPL W P-5'-P-CCNC: 17 U/L (ref 12–78)
ANION GAP SERPL CALCULATED.3IONS-SCNC: 3 MMOL/L (ref 4–13)
AST SERPL W P-5'-P-CCNC: 20 U/L (ref 5–45)
BASOPHILS # BLD AUTO: 0.06 THOUSANDS/ΜL (ref 0–0.1)
BASOPHILS NFR BLD AUTO: 1 % (ref 0–1)
BILIRUB SERPL-MCNC: 0.45 MG/DL (ref 0.2–1)
BUN SERPL-MCNC: 27 MG/DL (ref 5–25)
CALCIUM SERPL-MCNC: 9.7 MG/DL (ref 8.3–10.1)
CHLORIDE SERPL-SCNC: 108 MMOL/L (ref 100–108)
CO2 SERPL-SCNC: 28 MMOL/L (ref 21–32)
CREAT SERPL-MCNC: 2.35 MG/DL (ref 0.6–1.3)
EOSINOPHIL # BLD AUTO: 0.1 THOUSAND/ΜL (ref 0–0.61)
EOSINOPHIL NFR BLD AUTO: 1 % (ref 0–6)
ERYTHROCYTE [DISTWIDTH] IN BLOOD BY AUTOMATED COUNT: 13.8 % (ref 11.6–15.1)
GFR SERPL CREATININE-BSD FRML MDRD: 20 ML/MIN/1.73SQ M
GLUCOSE P FAST SERPL-MCNC: 98 MG/DL (ref 65–99)
HCT VFR BLD AUTO: 45.9 % (ref 34.8–46.1)
HGB BLD-MCNC: 14 G/DL (ref 11.5–15.4)
IMM GRANULOCYTES # BLD AUTO: 0.02 THOUSAND/UL (ref 0–0.2)
IMM GRANULOCYTES NFR BLD AUTO: 0 % (ref 0–2)
LYMPHOCYTES # BLD AUTO: 2.14 THOUSANDS/ΜL (ref 0.6–4.47)
LYMPHOCYTES NFR BLD AUTO: 30 % (ref 14–44)
MAGNESIUM SERPL-MCNC: 2.7 MG/DL (ref 1.6–2.6)
MCH RBC QN AUTO: 31.9 PG (ref 26.8–34.3)
MCHC RBC AUTO-ENTMCNC: 30.5 G/DL (ref 31.4–37.4)
MCV RBC AUTO: 105 FL (ref 82–98)
MONOCYTES # BLD AUTO: 0.46 THOUSAND/ΜL (ref 0.17–1.22)
MONOCYTES NFR BLD AUTO: 6 % (ref 4–12)
NEUTROPHILS # BLD AUTO: 4.36 THOUSANDS/ΜL (ref 1.85–7.62)
NEUTS SEG NFR BLD AUTO: 62 % (ref 43–75)
NRBC BLD AUTO-RTO: 0 /100 WBCS
PHOSPHATE SERPL-MCNC: 4.1 MG/DL (ref 2.3–4.1)
PLATELET # BLD AUTO: 187 THOUSANDS/UL (ref 149–390)
PMV BLD AUTO: 12.2 FL (ref 8.9–12.7)
POTASSIUM SERPL-SCNC: 4.2 MMOL/L (ref 3.5–5.3)
PROT SERPL-MCNC: 7.3 G/DL (ref 6.4–8.2)
RBC # BLD AUTO: 4.39 MILLION/UL (ref 3.81–5.12)
SODIUM SERPL-SCNC: 139 MMOL/L (ref 136–145)
WBC # BLD AUTO: 7.14 THOUSAND/UL (ref 4.31–10.16)

## 2020-11-06 PROCEDURE — 80053 COMPREHEN METABOLIC PANEL: CPT

## 2020-11-06 PROCEDURE — 85025 COMPLETE CBC W/AUTO DIFF WBC: CPT

## 2020-11-06 PROCEDURE — 36415 COLL VENOUS BLD VENIPUNCTURE: CPT

## 2020-11-06 PROCEDURE — 84100 ASSAY OF PHOSPHORUS: CPT

## 2020-11-06 PROCEDURE — 83735 ASSAY OF MAGNESIUM: CPT

## 2020-11-12 ENCOUNTER — HOSPITAL ENCOUNTER (OUTPATIENT)
Dept: RADIOLOGY | Facility: HOSPITAL | Age: 75
Discharge: HOME | End: 2020-11-12
Attending: INTERNAL MEDICINE
Payer: MEDICARE

## 2020-11-12 DIAGNOSIS — F17.200 TOBACCO USE DISORDER: ICD-10-CM

## 2020-11-12 DIAGNOSIS — I10 ESSENTIAL HYPERTENSION, MALIGNANT: ICD-10-CM

## 2020-11-12 DIAGNOSIS — R00.2 PALPITATIONS: ICD-10-CM

## 2020-11-12 DIAGNOSIS — R63.4 LOSS OF WEIGHT: ICD-10-CM

## 2020-11-12 DIAGNOSIS — R06.02 SHORTNESS OF BREATH: ICD-10-CM

## 2020-11-12 DIAGNOSIS — R07.9 CHEST PAIN, UNSPECIFIED TYPE: ICD-10-CM

## 2020-11-12 PROCEDURE — 71250 CT THORAX DX C-: CPT

## 2020-11-12 PROCEDURE — 74176 CT ABD & PELVIS W/O CONTRAST: CPT

## 2020-11-12 PROCEDURE — G1004 CDSM NDSC: HCPCS

## 2020-11-20 ENCOUNTER — TRANSCRIBE ORDERS (OUTPATIENT)
Dept: ADMINISTRATIVE | Facility: HOSPITAL | Age: 75
End: 2020-11-20

## 2020-11-20 DIAGNOSIS — I71.4 ABDOMINAL AORTIC ANEURYSM, WITHOUT RUPTURE (HCC): Primary | ICD-10-CM

## 2020-12-30 ENCOUNTER — HOSPITAL ENCOUNTER (OUTPATIENT)
Dept: RADIOLOGY | Facility: HOSPITAL | Age: 75
Discharge: HOME | End: 2020-12-30
Attending: INTERNAL MEDICINE
Payer: MEDICARE

## 2020-12-30 ENCOUNTER — TRANSCRIBE ORDERS (OUTPATIENT)
Dept: ADMINISTRATIVE | Facility: HOSPITAL | Age: 75
End: 2020-12-30

## 2020-12-30 DIAGNOSIS — I71.4 ABDOMINAL AORTIC ANEURYSM, WITHOUT RUPTURE (HCC): ICD-10-CM

## 2020-12-30 PROCEDURE — 93975 VASCULAR STUDY: CPT

## 2020-12-30 PROCEDURE — 93975 VASCULAR STUDY: CPT | Performed by: SURGERY

## 2021-02-26 ENCOUNTER — LAB (OUTPATIENT)
Dept: LAB | Facility: HOSPITAL | Age: 76
End: 2021-02-26
Attending: INTERNAL MEDICINE
Payer: MEDICARE

## 2021-02-26 ENCOUNTER — TRANSCRIBE ORDERS (OUTPATIENT)
Dept: ADMINISTRATIVE | Facility: HOSPITAL | Age: 76
End: 2021-02-26

## 2021-02-26 DIAGNOSIS — N18.9 CHRONIC KIDNEY DISEASE, UNSPECIFIED CKD STAGE: Primary | ICD-10-CM

## 2021-02-26 DIAGNOSIS — I71.4 ABDOMINAL AORTIC ANEURYSM WITHOUT RUPTURE (HCC): ICD-10-CM

## 2021-02-26 DIAGNOSIS — I73.9 PERIPHERAL VASCULAR DISEASE, UNSPECIFIED (HCC): ICD-10-CM

## 2021-02-26 DIAGNOSIS — N18.9 CHRONIC KIDNEY DISEASE, UNSPECIFIED CKD STAGE: ICD-10-CM

## 2021-02-26 LAB
ALBUMIN SERPL BCP-MCNC: 3.1 G/DL (ref 3.5–5)
ALP SERPL-CCNC: 81 U/L (ref 46–116)
ALT SERPL W P-5'-P-CCNC: 22 U/L (ref 12–78)
ANION GAP SERPL CALCULATED.3IONS-SCNC: 6 MMOL/L (ref 4–13)
AST SERPL W P-5'-P-CCNC: 18 U/L (ref 5–45)
BACTERIA UR QL AUTO: ABNORMAL /HPF
BASOPHILS # BLD AUTO: 0.05 THOUSANDS/ΜL (ref 0–0.1)
BASOPHILS NFR BLD AUTO: 1 % (ref 0–1)
BILIRUB SERPL-MCNC: 0.3 MG/DL (ref 0.2–1)
BILIRUB UR QL STRIP: NEGATIVE
BUN SERPL-MCNC: 25 MG/DL (ref 5–25)
CALCIUM ALBUM COR SERPL-MCNC: 9.6 MG/DL (ref 8.3–10.1)
CALCIUM SERPL-MCNC: 8.9 MG/DL (ref 8.3–10.1)
CHLORIDE SERPL-SCNC: 110 MMOL/L (ref 100–108)
CHOLEST SERPL-MCNC: 134 MG/DL (ref 50–200)
CLARITY UR: ABNORMAL
CO2 SERPL-SCNC: 30 MMOL/L (ref 21–32)
COLOR UR: YELLOW
CREAT SERPL-MCNC: 1.68 MG/DL (ref 0.6–1.3)
CRP SERPL QL: 0.6 MG/L
EOSINOPHIL # BLD AUTO: 0.11 THOUSAND/ΜL (ref 0–0.61)
EOSINOPHIL NFR BLD AUTO: 2 % (ref 0–6)
ERYTHROCYTE [DISTWIDTH] IN BLOOD BY AUTOMATED COUNT: 13.2 % (ref 11.6–15.1)
ERYTHROCYTE [SEDIMENTATION RATE] IN BLOOD: 5 MM/HOUR (ref 0–29)
GFR SERPL CREATININE-BSD FRML MDRD: 30 ML/MIN/1.73SQ M
GLUCOSE P FAST SERPL-MCNC: 91 MG/DL (ref 65–99)
GLUCOSE UR STRIP-MCNC: NEGATIVE MG/DL
HCT VFR BLD AUTO: 40.4 % (ref 34.8–46.1)
HDLC SERPL-MCNC: 45 MG/DL
HGB BLD-MCNC: 12.4 G/DL (ref 11.5–15.4)
HGB UR QL STRIP.AUTO: ABNORMAL
HYALINE CASTS #/AREA URNS LPF: ABNORMAL /LPF
IMM GRANULOCYTES # BLD AUTO: 0.02 THOUSAND/UL (ref 0–0.2)
IMM GRANULOCYTES NFR BLD AUTO: 0 % (ref 0–2)
KETONES UR STRIP-MCNC: NEGATIVE MG/DL
LDLC SERPL CALC-MCNC: 68 MG/DL (ref 0–100)
LEUKOCYTE ESTERASE UR QL STRIP: NEGATIVE
LYMPHOCYTES # BLD AUTO: 1.79 THOUSANDS/ΜL (ref 0.6–4.47)
LYMPHOCYTES NFR BLD AUTO: 28 % (ref 14–44)
MAGNESIUM SERPL-MCNC: 2.2 MG/DL (ref 1.6–2.6)
MCH RBC QN AUTO: 30.7 PG (ref 26.8–34.3)
MCHC RBC AUTO-ENTMCNC: 30.7 G/DL (ref 31.4–37.4)
MCV RBC AUTO: 100 FL (ref 82–98)
MONOCYTES # BLD AUTO: 0.58 THOUSAND/ΜL (ref 0.17–1.22)
MONOCYTES NFR BLD AUTO: 9 % (ref 4–12)
MUCOUS THREADS UR QL AUTO: ABNORMAL
NEUTROPHILS # BLD AUTO: 3.79 THOUSANDS/ΜL (ref 1.85–7.62)
NEUTS SEG NFR BLD AUTO: 60 % (ref 43–75)
NITRITE UR QL STRIP: NEGATIVE
NON-SQ EPI CELLS URNS QL MICRO: ABNORMAL /HPF
NONHDLC SERPL-MCNC: 89 MG/DL
NRBC BLD AUTO-RTO: 0 /100 WBCS
PH UR STRIP.AUTO: 5 [PH]
PHOSPHATE SERPL-MCNC: 4.2 MG/DL (ref 2.3–4.1)
PLATELET # BLD AUTO: 174 THOUSANDS/UL (ref 149–390)
PMV BLD AUTO: 11.6 FL (ref 8.9–12.7)
POTASSIUM SERPL-SCNC: 4.3 MMOL/L (ref 3.5–5.3)
PROT SERPL-MCNC: 6.6 G/DL (ref 6.4–8.2)
PROT UR STRIP-MCNC: ABNORMAL MG/DL
RBC # BLD AUTO: 4.04 MILLION/UL (ref 3.81–5.12)
RBC #/AREA URNS AUTO: ABNORMAL /HPF
SODIUM SERPL-SCNC: 146 MMOL/L (ref 136–145)
SP GR UR STRIP.AUTO: >=1.03 (ref 1–1.03)
TRIGL SERPL-MCNC: 106 MG/DL
UROBILINOGEN UR QL STRIP.AUTO: 0.2 E.U./DL
WBC # BLD AUTO: 6.34 THOUSAND/UL (ref 4.31–10.16)
WBC #/AREA URNS AUTO: ABNORMAL /HPF

## 2021-02-26 PROCEDURE — 85652 RBC SED RATE AUTOMATED: CPT | Performed by: INTERNAL MEDICINE

## 2021-02-26 PROCEDURE — 86140 C-REACTIVE PROTEIN: CPT

## 2021-02-26 PROCEDURE — 81001 URINALYSIS AUTO W/SCOPE: CPT | Performed by: INTERNAL MEDICINE

## 2021-02-26 PROCEDURE — 84100 ASSAY OF PHOSPHORUS: CPT

## 2021-02-26 PROCEDURE — 85025 COMPLETE CBC W/AUTO DIFF WBC: CPT

## 2021-02-26 PROCEDURE — 80061 LIPID PANEL: CPT

## 2021-02-26 PROCEDURE — 83735 ASSAY OF MAGNESIUM: CPT

## 2021-02-26 PROCEDURE — 36415 COLL VENOUS BLD VENIPUNCTURE: CPT | Performed by: INTERNAL MEDICINE

## 2021-02-26 PROCEDURE — 80053 COMPREHEN METABOLIC PANEL: CPT

## 2021-03-30 DIAGNOSIS — Z23 ENCOUNTER FOR IMMUNIZATION: ICD-10-CM

## 2021-05-07 ENCOUNTER — HOSPITAL ENCOUNTER (INPATIENT)
Facility: HOSPITAL | Age: 76
LOS: 2 days | Discharge: HOME | DRG: 307 | End: 2021-05-10
Attending: EMERGENCY MEDICINE | Admitting: FAMILY MEDICINE
Payer: MEDICARE

## 2021-05-07 ENCOUNTER — APPOINTMENT (EMERGENCY)
Dept: RADIOLOGY | Facility: HOSPITAL | Age: 76
DRG: 307 | End: 2021-05-07
Attending: EMERGENCY MEDICINE
Payer: MEDICARE

## 2021-05-07 DIAGNOSIS — I25.10 CORONARY ARTERY DISEASE INVOLVING NATIVE CORONARY ARTERY OF NATIVE HEART WITHOUT ANGINA PECTORIS: ICD-10-CM

## 2021-05-07 DIAGNOSIS — R07.9 CHEST PAIN: Primary | ICD-10-CM

## 2021-05-07 DIAGNOSIS — R94.31 T WAVE INVERSION IN EKG: ICD-10-CM

## 2021-05-07 PROBLEM — I71.4 ABDOMINAL AORTIC ANEURYSM (AAA) WITHOUT RUPTURE (HCC): Status: ACTIVE | Noted: 2020-12-16

## 2021-05-07 PROBLEM — E78.5 HYPERLIPIDEMIA: Status: ACTIVE | Noted: 2019-11-14

## 2021-05-07 PROBLEM — N18.32 STAGE 3B CHRONIC KIDNEY DISEASE (HCC): Status: ACTIVE | Noted: 2021-05-07

## 2021-05-07 PROBLEM — I71.40 ABDOMINAL AORTIC ANEURYSM (AAA) WITHOUT RUPTURE: Status: ACTIVE | Noted: 2020-12-16

## 2021-05-07 PROBLEM — N17.9 ACUTE KIDNEY INJURY (HCC): Status: RESOLVED | Noted: 2020-04-27 | Resolved: 2021-05-07

## 2021-05-07 LAB
ALBUMIN SERPL BCP-MCNC: 3.3 G/DL (ref 3.5–5)
ALP SERPL-CCNC: 95 U/L (ref 46–116)
ALT SERPL W P-5'-P-CCNC: 43 U/L (ref 12–78)
ANION GAP SERPL CALCULATED.3IONS-SCNC: 4 MMOL/L (ref 4–13)
AST SERPL W P-5'-P-CCNC: 37 U/L (ref 5–45)
BASOPHILS # BLD AUTO: 0.05 THOUSANDS/ΜL (ref 0–0.1)
BASOPHILS NFR BLD AUTO: 1 % (ref 0–1)
BILIRUB SERPL-MCNC: 0.28 MG/DL (ref 0.2–1)
BUN SERPL-MCNC: 21 MG/DL (ref 5–25)
CALCIUM ALBUM COR SERPL-MCNC: 8.9 MG/DL (ref 8.3–10.1)
CALCIUM SERPL-MCNC: 8.3 MG/DL (ref 8.3–10.1)
CHLORIDE SERPL-SCNC: 108 MMOL/L (ref 100–108)
CO2 SERPL-SCNC: 32 MMOL/L (ref 21–32)
CREAT SERPL-MCNC: 1.47 MG/DL (ref 0.6–1.3)
EOSINOPHIL # BLD AUTO: 0.23 THOUSAND/ΜL (ref 0–0.61)
EOSINOPHIL NFR BLD AUTO: 4 % (ref 0–6)
ERYTHROCYTE [DISTWIDTH] IN BLOOD BY AUTOMATED COUNT: 14.4 % (ref 11.6–15.1)
GFR SERPL CREATININE-BSD FRML MDRD: 34 ML/MIN/1.73SQ M
GLUCOSE SERPL-MCNC: 95 MG/DL (ref 65–140)
HCT VFR BLD AUTO: 41.5 % (ref 34.8–46.1)
HGB BLD-MCNC: 13 G/DL (ref 11.5–15.4)
IMM GRANULOCYTES # BLD AUTO: 0.02 THOUSAND/UL (ref 0–0.2)
IMM GRANULOCYTES NFR BLD AUTO: 0 % (ref 0–2)
LYMPHOCYTES # BLD AUTO: 1.7 THOUSANDS/ΜL (ref 0.6–4.47)
LYMPHOCYTES NFR BLD AUTO: 27 % (ref 14–44)
MCH RBC QN AUTO: 31.3 PG (ref 26.8–34.3)
MCHC RBC AUTO-ENTMCNC: 31.3 G/DL (ref 31.4–37.4)
MCV RBC AUTO: 100 FL (ref 82–98)
MONOCYTES # BLD AUTO: 0.68 THOUSAND/ΜL (ref 0.17–1.22)
MONOCYTES NFR BLD AUTO: 11 % (ref 4–12)
NEUTROPHILS # BLD AUTO: 3.58 THOUSANDS/ΜL (ref 1.85–7.62)
NEUTS SEG NFR BLD AUTO: 57 % (ref 43–75)
NRBC BLD AUTO-RTO: 0 /100 WBCS
PLATELET # BLD AUTO: 146 THOUSANDS/UL (ref 149–390)
PMV BLD AUTO: 11.2 FL (ref 8.9–12.7)
POTASSIUM SERPL-SCNC: 4.6 MMOL/L (ref 3.5–5.3)
PROT SERPL-MCNC: 6.8 G/DL (ref 6.4–8.2)
RBC # BLD AUTO: 4.16 MILLION/UL (ref 3.81–5.12)
SODIUM SERPL-SCNC: 144 MMOL/L (ref 136–145)
TROPONIN I SERPL-MCNC: <0.02 NG/ML
WBC # BLD AUTO: 6.26 THOUSAND/UL (ref 4.31–10.16)

## 2021-05-07 PROCEDURE — 84484 ASSAY OF TROPONIN QUANT: CPT | Performed by: EMERGENCY MEDICINE

## 2021-05-07 PROCEDURE — 99285 EMERGENCY DEPT VISIT HI MDM: CPT | Performed by: EMERGENCY MEDICINE

## 2021-05-07 PROCEDURE — 85025 COMPLETE CBC W/AUTO DIFF WBC: CPT | Performed by: EMERGENCY MEDICINE

## 2021-05-07 PROCEDURE — 71045 X-RAY EXAM CHEST 1 VIEW: CPT

## 2021-05-07 PROCEDURE — 96360 HYDRATION IV INFUSION INIT: CPT

## 2021-05-07 PROCEDURE — 99285 EMERGENCY DEPT VISIT HI MDM: CPT

## 2021-05-07 PROCEDURE — 80053 COMPREHEN METABOLIC PANEL: CPT | Performed by: EMERGENCY MEDICINE

## 2021-05-07 PROCEDURE — 36415 COLL VENOUS BLD VENIPUNCTURE: CPT | Performed by: EMERGENCY MEDICINE

## 2021-05-07 PROCEDURE — 93005 ELECTROCARDIOGRAM TRACING: CPT

## 2021-05-07 PROCEDURE — 1124F ACP DISCUSS-NO DSCNMKR DOCD: CPT | Performed by: EMERGENCY MEDICINE

## 2021-05-07 PROCEDURE — 99219 PR INITIAL OBSERVATION CARE/DAY 50 MINUTES: CPT | Performed by: PHYSICIAN ASSISTANT

## 2021-05-07 RX ORDER — ACETAMINOPHEN 325 MG/1
650 TABLET ORAL EVERY 4 HOURS PRN
Status: DISCONTINUED | OUTPATIENT
Start: 2021-05-07 | End: 2021-05-10 | Stop reason: HOSPADM

## 2021-05-07 RX ORDER — NITROGLYCERIN 0.4 MG/1
0.4 TABLET SUBLINGUAL
Status: DISCONTINUED | OUTPATIENT
Start: 2021-05-07 | End: 2021-05-10 | Stop reason: HOSPADM

## 2021-05-07 RX ORDER — ASPIRIN 81 MG/1
162 TABLET, CHEWABLE ORAL DAILY
Status: DISCONTINUED | OUTPATIENT
Start: 2021-05-08 | End: 2021-05-10 | Stop reason: HOSPADM

## 2021-05-07 RX ORDER — HEPARIN SODIUM 5000 [USP'U]/ML
5000 INJECTION, SOLUTION INTRAVENOUS; SUBCUTANEOUS EVERY 8 HOURS SCHEDULED
Status: DISCONTINUED | OUTPATIENT
Start: 2021-05-07 | End: 2021-05-10 | Stop reason: HOSPADM

## 2021-05-07 RX ORDER — ATORVASTATIN CALCIUM 10 MG/1
10 TABLET, FILM COATED ORAL DAILY
Status: DISCONTINUED | OUTPATIENT
Start: 2021-05-08 | End: 2021-05-10 | Stop reason: HOSPADM

## 2021-05-07 RX ORDER — ASPIRIN 325 MG
325 TABLET ORAL ONCE
Status: COMPLETED | OUTPATIENT
Start: 2021-05-07 | End: 2021-05-07

## 2021-05-07 RX ADMIN — ASPIRIN 325 MG: 325 TABLET ORAL at 22:19

## 2021-05-07 RX ADMIN — SODIUM CHLORIDE 1000 ML: 0.9 INJECTION, SOLUTION INTRAVENOUS at 20:47

## 2021-05-08 ENCOUNTER — APPOINTMENT (OUTPATIENT)
Dept: NON INVASIVE DIAGNOSTICS | Facility: HOSPITAL | Age: 76
DRG: 307 | End: 2021-05-08
Payer: MEDICARE

## 2021-05-08 LAB
NT-PROBNP SERPL-MCNC: 4983 PG/ML
PLATELET # BLD AUTO: 122 THOUSANDS/UL (ref 149–390)
PMV BLD AUTO: 10.7 FL (ref 8.9–12.7)
TROPONIN I SERPL-MCNC: <0.02 NG/ML
TROPONIN I SERPL-MCNC: <0.02 NG/ML

## 2021-05-08 PROCEDURE — 93306 TTE W/DOPPLER COMPLETE: CPT | Performed by: INTERNAL MEDICINE

## 2021-05-08 PROCEDURE — 93005 ELECTROCARDIOGRAM TRACING: CPT

## 2021-05-08 PROCEDURE — 93306 TTE W/DOPPLER COMPLETE: CPT

## 2021-05-08 PROCEDURE — 85049 AUTOMATED PLATELET COUNT: CPT | Performed by: PHYSICIAN ASSISTANT

## 2021-05-08 PROCEDURE — 99222 1ST HOSP IP/OBS MODERATE 55: CPT | Performed by: INTERNAL MEDICINE

## 2021-05-08 PROCEDURE — 99232 SBSQ HOSP IP/OBS MODERATE 35: CPT | Performed by: FAMILY MEDICINE

## 2021-05-08 PROCEDURE — 84484 ASSAY OF TROPONIN QUANT: CPT | Performed by: PHYSICIAN ASSISTANT

## 2021-05-08 PROCEDURE — 83880 ASSAY OF NATRIURETIC PEPTIDE: CPT | Performed by: PHYSICIAN ASSISTANT

## 2021-05-08 PROCEDURE — 97161 PT EVAL LOW COMPLEX 20 MIN: CPT

## 2021-05-08 RX ADMIN — HEPARIN SODIUM 5000 UNITS: 5000 INJECTION INTRAVENOUS; SUBCUTANEOUS at 21:11

## 2021-05-08 RX ADMIN — ATORVASTATIN CALCIUM 10 MG: 10 TABLET, FILM COATED ORAL at 10:26

## 2021-05-08 RX ADMIN — METOPROLOL TARTRATE 25 MG: 50 TABLET, FILM COATED ORAL at 10:26

## 2021-05-08 RX ADMIN — HEPARIN SODIUM 5000 UNITS: 5000 INJECTION INTRAVENOUS; SUBCUTANEOUS at 00:52

## 2021-05-08 RX ADMIN — HEPARIN SODIUM 5000 UNITS: 5000 INJECTION INTRAVENOUS; SUBCUTANEOUS at 06:57

## 2021-05-08 RX ADMIN — ASPIRIN 81 MG CHEWABLE TABLET 162 MG: 81 TABLET CHEWABLE at 10:26

## 2021-05-08 RX ADMIN — HEPARIN SODIUM 5000 UNITS: 5000 INJECTION INTRAVENOUS; SUBCUTANEOUS at 14:34

## 2021-05-08 RX ADMIN — METOPROLOL TARTRATE 25 MG: 50 TABLET, FILM COATED ORAL at 21:11

## 2021-05-08 NOTE — ASSESSMENT & PLAN NOTE
Noted on CT abdomen in 11/2020  Infrarenal AAA measuring 4 1 cm  Continue outpatient f/u with cardiology and routine imaging

## 2021-05-08 NOTE — ASSESSMENT & PLAN NOTE
Patient presented to the ED with chest pain, no current chest pain but intermittent for the past few days     COSME score 3    - Initial troponin <0 02, continue to trend   - CXR without acute abnormalities on initial read, final read pending  - Monitor on telemetry  - EKG with T wave inversions in lateral leads, similar to previous EKG, LVH  - PRN nitro  - Continue Lopressor 25 mg BID   - Repeat EKG with serial troponins/further chest pain  - Repeat echo in AM  - Cardiology consult appreciated

## 2021-05-08 NOTE — PHYSICAL THERAPY NOTE
PT EVALUATION    Pt is independent with gait and functional mobility on levels and stairs, not in need of skilled physical therapy services at this time  05/08/21 1120   PT Last Visit   PT Visit Date 05/08/21   Note Type   Note type Evaluation   Pain Assessment   Pain Assessment Tool Pain Assessment not indicated - pt denies pain   Home Living   Type of Home House  (1 [address])   Home Layout Two level  (Bed upstairs;bathroom downstairs)   Home Equipment   (No DME per pt)   Additional Comments Pt reports she is sleeping on the 1st floor   Prior Function   Level of [place] Independent with ADLs and functional mobility   Lives With Alone   Receives Help From Family   ADL Assistance Independent   Comments Pt does not drive  Pt ambulates without an assistive device prior to admission  Pt reports her friend or family assist her in getting groceries  General   Additional Pertinent History Pt admitted with chest pain  At time of PT about pt reports she no longer has chest pain  Cognition   Overall Cognitive Status WFL   Arousal/Participation Cooperative   Orientation Level Oriented X4   Following Commands Follows all commands and directions without difficulty   RLE Assessment   RLE Assessment WFL   LLE Assessment   LLE Assessment WFL   Bed Mobility   Supine to Sit 7  Independent   Sit to Supine 7  Independent   Transfers   Sit to Stand 7  Independent   Stand to Sit 7  Independent   Ambulation/Elevation   Gait pattern   [facility])   Gait Assistance 7  Independent   Assistive Device None   Distance 300 feet with change in direction   Stair Management Assistance 7  Independent   Stair Management Technique Alternating pattern; Two rails   Number of Stairs 3   Balance   Static Sitting Good   Dynamic Sitting Good   Static Standing Good   Dynamic Standing Fair   Ambulatory Fair   Activity Tolerance   Activity Tolerance Patient tolerated treatment well   Assessment   Assessment Patient seen for Physical Therapy evaluation  Patient admitted with Chest pain  Comorbidities affecting patient's physical performance include:  Hypertension, CAD, AAA, HLD, CKD 3, arthritis  Personal factors affecting patient at time of initial evaluation include: lives in two story house and stairs to enter home  Prior to admission, patient was independent with functional mobility without assistive device, independent with ADLS, living alone in two story home with 1 steps to enter, ambulating household distance and ambulating community distances  Please find objective findings from Physical Therapy assessment regarding body systems outlined above with impairments and limitations including none  The Barthel Index was used as a functional outcome tool presenting with a score of 100 today indicating no limitations of functional mobility and ADLS  Patient's clinical presentation is currently stable  Pt is I with gait and functional mobility without an assistive device on level and stairs  Pt is not in need of skilled physical therapy services at this time  As demonstrated by objective findings, the assigned level of complexity for this evaluation is low  The patient's -[facility] Basic Mobility Inpatient Short Form Raw Score is 24, Standardized Score is 57 68  A standardized score greater than 42 9 suggests the patient may benefit from discharge to home  Please also refer to the recommendation of the Physical Therapist for safe discharge planning  Plan   Treatment/Interventions ADL retraining;Functional transfer training;LE strengthening/ROM; Elevations; Therapeutic exercise; Endurance training;Patient/family training;Equipment eval/education; Bed mobility;Gait training; Compensatory technique education   PT Frequency One time visit   Recommendation   PT Discharge Recommendation No rehabilitation needs   Additional Comments Pt is I with gait and functional mobility on level and stairs and I with ADLs    Pt is not in need of skilled inpatient PT/OT services at this time  AM-PAC Basic Mobility Inpatient   Turning in Bed Without Bedrails 4   Lying on Back to Sitting on Edge of Flat Bed 4   Moving Bed to Chair 4   Standing Up From Chair 4   Walk in Room 4   Climb 3-5 Stairs 4   Basic Mobility Inpatient Raw Score 24   Basic Mobility Standardized Score 57 68   Barthel Index   Feeding 10   Bathing 5   Grooming Score 5   Dressing Score 10   Bladder Score 10   Bowels Score 10   Toilet Use Score 10   Transfers (Bed/Chair) Score 15   Mobility (Level Surface) Score 15   Stairs Score 10   Barthel Index Score 972   Licensure   [place] License Number  Anya Mancia PT [id number]     Portions of the documentation may have been created using voice recognition software  Occasional wrong word or sound alike substitutions may have occurred due to the inherent limitation of the voice recognition software  Read the chart carefully and recognize, using context, where substitutions have occurred

## 2021-05-08 NOTE — ASSESSMENT & PLAN NOTE
Hx of CAD s/p PCI in 2012 with NARGIS to RCA and again in 2013 with NARGIS to LAD   Most recent nuclear stress test in 2/2019, unable to view results   Continue ASA, Lopressor, statin therapy

## 2021-05-08 NOTE — CONSULTS
CARDIOLOGY CONSULTATION  Sis Lopez 68 y o  female MRN: [id number]  Unit/Bed#: [address] [place] [address] Encounter: [id number]      History of Present Illness   PCP: Roberto Carlos Amaro MD  Date of Admission:  5/7/2021  Date of Consultation: 05/08/21  Physician Requesting Consult: Marco A Green DO    Reason for Consult / Principal Problem:  Chest pain    Assessment/Plan   Chest pain in the setting of coronary artery disease with prior PCI (Drug-eluting stent to LAD in 2013, drug-eluting stent to RCA in 2012)  Patient was set up for an outpatient nuclear stress test by her cardiologist   However she was concerned with worsening symptoms  Here her cardiac enzymes are negative x2  She reports having periodic chest tightness with associated jaw painPlan for nuclear stress test on Monday  Of note patient also reports having severe anxiety  She recently had loss of her son  Bereavement/ anxiety    Palpitations-  History of frequent  Ectopy  Previous Holter with tachy-fabián  Continue telemetry monitoring    Infrarenal abdominal aortic aneurysm 4 1 cm    Prior smoker      HPI: Sis Lopez is a 68y o  year old female who presents with  With chest discomfort and in it jaw pain  She recently saw her cardiologist this week  She reports having increased frequency of symptoms  She was concerned and did not want for an outpatient stress test   She reports also being very anxious and depressed from the recent loss of her son  Her daughter is accompanying her  Patient has been compliant with her medications  She also notes having periodic palpitations  She denies having syncope  Denies having recent fevers or chills  Historical Information   Past Medical History:   Diagnosis Date    Arthritis     Coronary artery disease     Hypertension      History reviewed  No pertinent surgical history    Social History     Substance and Sexual Activity   Alcohol Use Not Currently     Social History     Substance and Sexual Activity   Drug Use Not Currently     Social History     Tobacco Use   Smoking Status Former Smoker    Types: Cigarettes   Smokeless Tobacco Never Used     History reviewed  No pertinent family history  Meds/Allergies   Prior to Admission medications    Medication Sig Start Date End Date Taking? Authorizing Provider   aspirin 81 mg chewable tablet Chew 162 mg daily   Yes Historical Provider, MD   atorvastatin (LIPITOR) 10 mg tablet Take 10 mg by mouth daily   Yes Historical Provider, MD   metoprolol tartrate (LOPRESSOR) 25 mg tablet Take 25 mg by mouth 2 (two) times a day 1/7/20  Yes Historical Provider, MD     Current Facility-Administered Medications   Medication Dose Route Frequency Provider Last Rate Last Admin    acetaminophen (TYLENOL) tablet 650 mg  650 mg Oral Q4H PRN Kencherise Wilkes PA-C        aspirin chewable tablet 162 mg  162 mg Oral Daily Kenith Chung, PA-C   162 mg at 05/08/21 1026    atorvastatin (LIPITOR) tablet 10 mg  10 mg Oral Daily Kenith Chung, PA-C   10 mg at 05/08/21 1026    heparin (porcine) subcutaneous injection 5,000 Units  5,000 Units Subcutaneous [facility] Estrellita Wilkes PA-C   5,000 Units at 05/08/21 1434    metoprolol tartrate (LOPRESSOR) tablet 25 mg  25 mg Oral BID Kenith Chung, PA-C   25 mg at 05/08/21 1026    morphine injection 2 mg  2 mg Intravenous Q4H PRN Estrellita Wilkes PA-C        nitroglycerin (NITROSTAT) SL tablet 0 4 mg  0 4 mg Sublingual Q5 Min PRN Kencherise Wilkes PA-C         Allergies   Allergen Reactions    Pollen Extract      Runny nose       Review of systems  CONSTITUTIONAL:  As per hpi  HEENT:  Eyes:  No visual loss, blurred vision, double vision or yellow sclerae  Ears, Nose, Throat:  No hearing loss, sneezing, congestion, runny nose or sore throat  SKIN:  No rash or itching  CARDIOVASCULAR:  As per hpi  RESPIRATORY:  As per hpi  GASTROINTESTINAL:  No anorexia, nausea, vomiting or diarrhea  No abdominal pain or blood    GENITOURINARY: Burning on urination  No flank pain  NEUROLOGICAL:  No headache, dizziness, syncope, paralysis, ataxia, numbness or tingling in the extremities  No change in bowel or bladder control  MUSCULOSKELETAL:  No muscle, back pain, joint pain or stiffness  HEMATOLOGIC:  No anemia, bleeding or bruising  LYMPHATICS:  No enlarged nodes  No history of splenectomy  PSYCHIATRIC:  No active suicidal or homicidal ideation  ENDOCRINOLOGIC:  No reports of sweating, cold or heat intolerance  No polyuria or polydipsia  ALLERGIES:  No history of asthma, hives, eczema or rhinitis  More than 10 systems reviewed and otherwise noncontributory  Objective   Vitals: Blood pressure 135/76, pulse 62, temperature 97 8 °F (36 6 °C), resp  rate 19, height 5' 8" (1 727 m), weight 72 6 kg (160 lb 0 9 oz), SpO2 98 %  Blood pressure 135/76, pulse 62, temperature 97 8 °F (36 6 °C), resp  rate 19, height 5' 8" (1 727 m), weight 72 6 kg (160 lb 0 9 oz), SpO2 98 %  Body mass index is 24 34 kg/m²  BP Readings from Last 3 Encounters:   05/08/21 135/76   04/28/20 106/55   12/28/16 148/88     Orthostatic Blood Pressures      Most Recent Value   Blood Pressure  135/76 filed at 05/08/2021 1504   Patient Position - Orthostatic VS  Sitting filed at 05/07/2021 2253          Intake/Output Summary (Last 24 hours) at 5/8/2021 1542  Last data filed at 5/8/2021 1401  Gross per 24 hour   Intake 1480 ml   Output 550 ml   Net 930 ml     Invasive Devices     Peripheral Intravenous Line            Peripheral IV 05/07/21 Right Antecubital less than 1 day                Physical Exam   Constitutional: She is oriented to person, place, and time  She appears well-developed and well-nourished  No distress  HENT:   Head: Normocephalic and atraumatic  Right Ear: External ear normal    Left Ear: External ear normal    Nose: Nose normal    Mouth/Throat: No oropharyngeal exudate  Eyes: Pupils are equal, round, and reactive to light   Conjunctivae are normal  Right eye exhibits no discharge  Left eye exhibits no discharge  No scleral icterus  Neck: Normal range of motion  No JVD present  No tracheal deviation present  No thyromegaly present  Cardiovascular: Normal rate, regular rhythm and intact distal pulses  Exam reveals gallop  Exam reveals no friction rub  Murmur heard  Pulmonary/Chest: Effort normal and breath sounds normal  No stridor  No respiratory distress  She has no wheezes  She has no rales  She exhibits no tenderness  Abdominal: Soft  Bowel sounds are normal  She exhibits distension  She exhibits no mass  There is no abdominal tenderness  There is no rebound and no guarding  Genitourinary:    Genitourinary Comments: No CVA tenderness     Musculoskeletal:         General: Deformity present  No tenderness or edema  Neurological: She is alert and oriented to person, place, and time  She displays normal reflexes  She exhibits normal muscle tone  Skin: Skin is warm and dry  No rash noted  She is not diaphoretic  No erythema  Psychiatric: She has a normal mood and affect  Her behavior is normal  Judgment and thought content normal        Lab Results:  I Have Reviewed All Lab Data Below:  Results from last 7 days   Lab Units 05/08/21 0048 05/07/21 2045   WBC Thousand/uL  --  6 26   HEMOGLOBIN g/dL  --  13 0   HEMATOCRIT %  --  41 5   PLATELETS Thousands/uL 122* 146*   NEUTROS PCT %  --  57   LYMPHS PCT %  --  27   MONOS PCT %  --  11   EOS PCT %  --  4     Results from last 7 days   Lab Units 05/07/21 2045   POTASSIUM mmol/L 4 6   CHLORIDE mmol/L 108   CO2 mmol/L 32   BUN mg/dL 21   CREATININE mg/dL 1 47*   CALCIUM mg/dL 8 3   ALK PHOS U/L 95   ALT U/L 43   AST U/L 37     Troponins:   Results from last 7 days   Lab Units 05/08/21  0524 05/08/21 0048 05/07/21 2045   TROPONIN I ng/mL <0 02 <0 02 <0 02       CBC with diff:   Results from last 7 days   Lab Units 05/08/21 0048 05/07/21 2045   WBC Thousand/uL  --  6 26   HEMOGLOBIN g/dL  --  13 0 HEMATOCRIT %  --  41 5   MCV fL  --  100*   PLATELETS Thousands/uL 122* 146*   MCH pg  --  31 3   MCHC g/dL  --  31 3*   RDW %  --  14 4   MPV fL 10 7 11 2   NRBC AUTO /100 WBCs  --  0       CMP:   Results from last 7 days   Lab Units 21  2045   SODIUM mmol/L 144   POTASSIUM mmol/L 4 6   CHLORIDE mmol/L 108   CO2 mmol/L 32   ANION GAP mmol/L 4   BUN mg/dL 21   CREATININE mg/dL 1 47*   CALCIUM mg/dL 8 3   AST U/L 37   ALT U/L 43   ALK PHOS U/L 95   TOTAL PROTEIN g/dL 6 8   ALBUMIN g/dL 3 3*   TOTAL BILIRUBIN mg/dL 0 28   EGFR ml/min/1 73sq m 34   GLUCOSE RANDOM mg/dL 95     Magnesium:       NT-proBNP:   Recent Labs     21  0048   NTBNP 4,983*        Coags:       Troponins:   Results from last 7 days   Lab Units 21  0524 21  0048 21   TROPONIN I ng/mL <0 02 <0 02 <0 02       Lipid Profile:                              Lab Results   Component Value Date    CHOLESTEROL 134 2021    HDL 45 2021    LDLCALC 68 2021    TRIG 106 2021       TSH:        Hgb A1c:       Imaging: I have personally reviewed pertinent films in PACS    Cardiac testing:   Results for orders placed during the hospital encounter of 21   Echo complete with contrast if indicated    Narrative [address]  [address] [address]  [phone]    Transthoracic Echocardiogram  2D, M-mode, Doppler, and Color Doppler    Study date:  08-May-2021    Patient: Jen Valenzuela  MR number: [id number]  Account number: [de-identified]  : 1945  Age: 68 years  Gender: Female  Status: Inpatient  Location: Bedside  Height: 68 in  Weight: 159 7 lb  BP: 114/ 64 mmHg    Indications: CAD, Chest Pain    Diagnoses: I25 83 - Coronary atherosclerosis due to lipid rich plaque, R07 9 - Chest pain, unspecified    Sonographer:  Ilda Gooden RDCS  Primary Physician:  Mechelle Johnson MD  Referring Physician:  Alejandra Puentes PA-C  Group:  [facility]  Ordering Physician:  Kwadwo Ponce PA-C  Interpreting Physician:  Miracle Alston MD    SUMMARY    LEFT VENTRICLE:  Systolic function was normal  Ejection fraction was estimated in the range of 50 % to 55 %  There were no regional wall motion abnormalities  LEFT ATRIUM:  The atrium was moderately dilated measuring 26cm2    RIGHT ATRIUM:  The atrium was moderately dilated measuring 27cm2    MITRAL VALVE:  There was mild to moderate regurgitation  Regurgitation grade was 1-2+ on a scale of 0 to 4+  AORTIC VALVE:  There was trace regurgitation  TRICUSPID VALVE:  There was moderate regurgitation  PULMONIC VALVE:  There was mild regurgitation  HISTORY: PRIOR HISTORY: HTN, AAA w/o rupture, HLD, Chronic Kidney Disease    PROCEDURE: The procedure was performed at the bedside  This was a routine study  The transthoracic approach was used  The study included complete 2D imaging, M-mode, complete spectral Doppler, and color Doppler  The heart rate was 57 bpm,  at the start of the study  Image quality was adequate  LEFT VENTRICLE: Size was normal  Systolic function was normal  Ejection fraction was estimated in the range of 50 % to 55 %  There were no regional wall motion abnormalities  Wall thickness was normal  No evidence of apical thrombus  DOPPLER: The study was not technically sufficient to allow evaluation of LV diastolic function  RIGHT VENTRICLE: The size was normal  Systolic function was normal with TAPSE-2 9cm Wall thickness was normal     LEFT ATRIUM: The atrium was moderately dilated measuring 26cm2    RIGHT ATRIUM: The atrium was moderately dilated measuring 27cm2    MITRAL VALVE: There was mild thickening  There was normal leaflet separation  DOPPLER: The transmitral velocity was within the normal range  There was no evidence for stenosis  There was mild to moderate regurgitation  Regurgitation grade  was 1-2+ on a scale of 0 to 4+   The regurgitant jet was centrally directed  AORTIC VALVE: The valve was trileaflet  Leaflets exhibited mildly increased thickness, mild calcification, and normal cuspal separation  DOPPLER: Transaortic velocity was within the normal range  There was no evidence for stenosis  There  was trace regurgitation  TRICUSPID VALVE: There was mild thickening  There was normal leaflet separation  DOPPLER: The transtricuspid velocity was within the normal range  There was no evidence for stenosis  There was moderate regurgitation  Estimated peak PA  pressure was 45 mmHg  The findings suggest mild pulmonary hypertension  PULMONIC VALVE: Leaflets exhibited normal thickness, no calcification, and normal cuspal separation  DOPPLER: The transpulmonic velocity was within the normal range  There was mild regurgitation  PERICARDIUM: There was no pericardial effusion  The pericardium was normal in appearance  AORTA: The root exhibited normal size  SYSTEMIC VEINS: IVC: The inferior vena cava was normal in size      SYSTEM MEASUREMENT TABLES    2D  EF (Teich): 54 06 %  %FS: 28 21 %  Ao Diam: 3 01 cm  EDV(Teich): 137 46 ml  ESV(Teich): 63 15 ml  HR_2Ch_Q: 49 1 bpm  HR_4Ch_Q: 50 06 bpm  IVSd: 0 86 cm  LA Area: 25 89 cm2  LA Diam: 3 85 cm  LVCO_2Ch_Q: 2 18 L/min  LVCO_4Ch_Q: 2 13 L/min  LVCO_BiP_Q: 2 16 L/min  LVEF_2Ch_Q: 54 07 %  LVEF_4Ch_Q: 60 44 %  LVEF_BiP_Q: 57 04 %  LVIDd: 5 34 cm  LVIDs: 3 83 cm  LVLd_2Ch_Q: 6 06 cm  LVLd_4Ch_Q: 6 57 cm  LVLs_2Ch_Q: 5 34 cm  LVLs_4Ch_Q: 5 65 cm  LVOT Diam: 2 03 cm  LVPWd: 0 92 cm  LVSV_2Ch_Q: 44 5 ml  LVSV_4Ch_Q: 42 52 ml  LVSV_BiP_Q: 41 89 ml  LVVED_2Ch_Q: 82 3 ml  LVVED_4Ch_Q: 70 35 ml  LVVED_BiP_Q: 73 45 ml  LVVES_2Ch_Q: 37 8 ml  LVVES_4Ch_Q: 27 83 ml  LVVES_BiP_Q: 31 56 ml  RA Area: 26 88 cm2  RVIDd: 3 93 cm  SV (Teich): 74 31 ml    CW  AV Vmax: 1 02 m/s  AV maxP 17 mmHg  TR Vmax: 3 12 m/s  TR maxP 97 mmHg    MM  TAPSE: 2 94 cm    PW  MV E/A Ratio: 1 7  E' Sept: 0 08 m/s  E/E' Sept: 7 03  LVOT Vmax: 0 88 m/s  LVOT maxPG: 3 12 mmHg  MV A [place]: 0 33 m/s  MV Dec [place]: 3 27 m/s2  MV DecT: 172 39 ms  MV E [place]: 0 56 m/s    [facility] Accredited Echocardiography Laboratory    Prepared and electronically signed by    Obie Kwok MD  Signed [id number] 14:06:34         Cardiac event monitor 11/6/2020 to 11/11/2020 showed baseline sinus rhythm with average heart rate of 59 bpm (range:  bpm)   There were Three 3-13 beat runs of atrial tachycardia with the fastest rate of 125 bpm   There were rare APCs and VPCs (<1%)   There were no sustained cardiac dysrhythmias   There were no symptomatic pauses   There was no evidence for atrial fibrillation burden  There was a 4 beat run of asymptomatic AIVR at a rate of approximately 63 bpm on 11/8/2020 at 11:12 PM   In view of the above findings, please correlate clinically regarding assessment for underlying obstructive sleep apnea syndrome  Counseling / Coordination of Care Time: 40 min  Greater than 50% of total time spent on patient counseling and coordination of care  Code Status: Level 1 - Full Code  Collaboration of Care: Were Recommendations Directly Discussed with Primary Treatment Team? - Yes     Obie Kwok MD [facility]    "This note has been constructed using a voice recognition system  Therefore there may be syntax, spelling, and/or grammatical errors   Please call if you have any questions  "

## 2021-05-08 NOTE — ASSESSMENT & PLAN NOTE
Lab Results   Component Value Date    EGFR 31 05/10/2021    EGFR 38 05/09/2021    EGFR 34 05/07/2021    CREATININE 1 59 (H) 05/10/2021    CREATININE 1 36 (H) 05/09/2021    CREATININE 1 47 (H) 05/07/2021     Cr stable at baseline   baseline ranges from 1 6-2 0

## 2021-05-08 NOTE — H&P
[address] 1945, 68 y o  female MRN: [id number]  Unit/Bed#: [address] [place] [address] Encounter: [id number]  Primary Care Provider: Forrest Cedeño MD   Date and time admitted to hospital: 5/7/2021  8:38 PM    * Chest pain  Assessment & Plan  Patient presented to the ED with chest pain, no current chest pain but intermittent for the past few days     COSME score 3    - Initial troponin <0 02, continue to trend   - CXR without acute abnormalities on initial read, final read pending  - Monitor on telemetry  - EKG with T wave inversions in lateral leads, similar to previous EKG, LVH  - PRN nitro  - Continue Lopressor 25 mg BID   - Repeat EKG with serial troponins/further chest pain  - Repeat echo in AM  - Cardiology consult appreciated      Stage 3b chronic kidney disease [facility])  Assessment & Plan  Lab Results   Component Value Date    EGFR 34 05/07/2021    EGFR 30 02/26/2021    EGFR 20 11/06/2020    CREATININE 1 47 (H) 05/07/2021    CREATININE 1 68 (H) 02/26/2021    CREATININE 2 35 (H) 11/06/2020     Cr stable today at 1 47  No VANNA, baseline ranges from 1 6-2 0   Monitor     Hyperlipidemia  Assessment & Plan  Continue statin therapy     Abdominal aortic aneurysm (AAA) without rupture [facility])  Assessment & Plan  Noted on CT abdomen in 11/2020  Infrarenal AAA measuring 4 1 cm  Continue outpatient f/u with cardiology and routine imaging     Coronary artery disease involving native coronary artery of native heart without angina pectoris  Assessment & Plan  Hx of CAD s/p PCI in 2012 with NARGIS to RCA and again in 2013 with NARGIS to LAD   Most recent nuclear stress test in 2/2019, unable to view results   Continue ASA, Lopressor, statin therapy    Essential hypertension  Assessment & Plan  Continue Lopressor 25 mg BID     VTE Prophylaxis: Heparin  / sequential compression device   Code Status: Level 1  POLST: POLST is not applicable to this patient  Discussion with family: No    Anticipated Length of Stay:  Patient will be admitted on an Observation basis with an anticipated length of stay of  < 2 midnights  Justification for Hospital Stay: chest pain, r/o ACS    Total Time for Visit, including Counseling / Coordination of Care: 30 minutes  Greater than 50% of this total time spent on direct patient counseling and coordination of care  Chief Complaint:   Chest pain    History of Present Illness:    Nito Artis is a 68 y o  female who presents with PMH of HTN, HLD, CAD s/p stent x2, mild cognitive impairment, AAA, MVR  She presented to the ED today for evaluation of chest pain  She denied any chest pain upon presentation to the ED but had been having on and off chest pain throughout the day today  She was seen outpatient by cardiology yesterday and reported having intermittent jaw pain  She denies any further episodes of jaw pain since, but describes a sensation of "skipped heartbeat" throughout the day today and denies "pain" and rather describes it as a discomfort with the sensation of palpitations  She denies any SOB, headaches, lightheadedness, dizziness  She denies abdominal pain, nausea, vomiting  According to outpatient cardiology note, patient was likely due to repeat a nuclear stress test and echo by the end of the month if symptoms persisted  She is feeling well now and denies any further chest pain, palpitations  She has been taking ASA 81mg daily and Lopressor 25 mg BID  Initial troponin in the ED < 0 02 and EKG similar to previous with T wave inversions in lateral leads  Due to HEART score > 4, patient will be admitted for observation for chest pain  Review of Systems:    Review of Systems   Constitutional: Negative for chills, diaphoresis, fatigue and fever  HENT: Negative for congestion, rhinorrhea and sore throat  Respiratory: Negative for cough, shortness of breath and wheezing  Cardiovascular: Positive for chest pain and palpitations   Negative for leg swelling  Gastrointestinal: Negative for abdominal distention, abdominal pain, diarrhea, nausea and vomiting  Genitourinary: Negative for dysuria, frequency, hematuria and urgency  Musculoskeletal: Negative for gait problem  Skin: Negative for color change  Neurological: Negative for dizziness, weakness, light-headedness and headaches  Past Medical and Surgical History:     Past Medical History:   Diagnosis Date    Arthritis     Coronary artery disease     Hypertension        History reviewed  No pertinent surgical history  Meds/Allergies:    Prior to Admission medications    Medication Sig Start Date End Date Taking? Authorizing Provider   aspirin 81 mg chewable tablet Chew 162 mg daily   Yes Historical Provider, MD   atorvastatin (LIPITOR) 10 mg tablet Take 10 mg by mouth daily   Yes Historical Provider, MD   metoprolol tartrate (LOPRESSOR) 25 mg tablet Take 25 mg by mouth 2 (two) times a day 1/7/20  Yes Historical Provider, MD   clopidogrel (PLAVIX) 75 mg tablet Take 75 mg by mouth daily  5/7/21  Historical Provider, MD   lisinopril-hydrochlorothiazide (PRINZIDE,ZESTORETIC) 20-12 5 MG per tablet Take 1 tablet by mouth daily  5/7/21  Historical Provider, MD   Na Sulfate-K Sulfate-Mg Sulf 17 5-3 13-1 6 [id number] SOLN Take 1 applicator by mouth once for 1 dose 4/29/20 5/7/21  Alfonzo Winter MD   pantoprazole (PROTONIX) 40 mg tablet Take 1 tablet (40 mg total) by mouth daily 10/26/20 5/7/21  Patti Barrett PA-C       Allergies: Allergies   Allergen Reactions    Pollen Extract      Runny nose       Social History:    Social History     Substance and Sexual Activity   Alcohol Use Not Currently     Social History     Tobacco Use   Smoking Status Former Smoker    Types: Cigarettes   Smokeless Tobacco Never Used     Social History     Substance and Sexual Activity   Drug Use Not Currently       Family History:    History reviewed  No pertinent family history      Physical Exam: Vitals:   Blood Pressure: (!) 175/102 (05/07/21 2253)  Pulse: 66 (05/07/21 2253)  Temperature: 97 8 °F (36 6 °C) (05/07/21 2253)  Temp Source: Oral (05/07/21 2253)  Respirations: 20 (05/07/21 2253)  Height: 5' 8" (172 7 cm) (05/07/21 2253)  Weight - Scale: 72 6 kg (160 lb 0 9 oz) (05/07/21 2253)  SpO2: 97 % (05/07/21 2253)    Physical Exam  Vitals signs reviewed  Constitutional:       General: She is not in acute distress  Appearance: Normal appearance  She is well-developed  She is not ill-appearing  HENT:      Head: Normocephalic and atraumatic  Cardiovascular:      Rate and Rhythm: Normal rate and regular rhythm  Heart sounds: Normal heart sounds  No murmur  No gallop  Pulmonary:      Effort: Pulmonary effort is normal  No respiratory distress  Breath sounds: Normal breath sounds  No wheezing, rhonchi or rales  Abdominal:      General: Bowel sounds are normal  There is no distension  Palpations: Abdomen is soft  Tenderness: There is no abdominal tenderness  There is no guarding  Musculoskeletal:         General: No tenderness  Right lower leg: Edema (trace pitting edema bilaterally) present  Left lower leg: Edema present  Skin:     General: Skin is warm and dry  Findings: No erythema or rash  Neurological:      Mental Status: She is alert and oriented to person, place, and time  Psychiatric:         Mood and Affect: Mood normal          Behavior: Behavior normal          Additional Data:     Lab Results: I have personally reviewed pertinent reports        Results from last 7 days   Lab Units 05/07/21 2045   WBC Thousand/uL 6 26   HEMOGLOBIN g/dL 13 0   HEMATOCRIT % 41 5   PLATELETS Thousands/uL 146*   NEUTROS PCT % 57   LYMPHS PCT % 27   MONOS PCT % 11   EOS PCT % 4     Results from last 7 days   Lab Units 05/07/21 2045   SODIUM mmol/L 144   POTASSIUM mmol/L 4 6   CHLORIDE mmol/L 108   CO2 mmol/L 32   BUN mg/dL 21   CREATININE mg/dL 1 47*   ANION GAP mmol/L 4   CALCIUM mg/dL 8 3   ALBUMIN g/dL 3 3*   TOTAL BILIRUBIN mg/dL 0 28   ALK PHOS U/L 95   ALT U/L 43   AST U/L 37   GLUCOSE RANDOM mg/dL 95                       Imaging: I have personally reviewed pertinent reports  XR chest 1 view portable    (Results Pending)       EKG, Pathology, and Other Studies Reviewed on Admission:   · EKG: sinus rhythm, 65 bpm, T wave (+/-) in V2/V3, T wave inversions similar to previous EKG in V4-V6, T wave flattened in lead I (previously T wave inversions in lead I in old EKG), LVH    Allscripts / Epic Records Reviewed: Yes     ** Please Note: This note has been constructed using a voice recognition system   **

## 2021-05-08 NOTE — ASSESSMENT & PLAN NOTE
Patient presented to the ED with chest pain, no current chest pain but intermittent for the past few days     COSME score 3    - troponins negative  - CXR without acute abnormalities  - EKG with T wave inversions in lateral leads, similar to previous EKG, LVH  - Continue Lopressor 25 mg BID  - echo showed EF of 50-55% with moderately dilated left and right atrium, mild-to-moderate mitral valve regurgitation and moderate tricuspid valve regurgitation  - nuclear stress test was negative for ischemia per Cardiology  Follow up with own cardiologist after discharge

## 2021-05-08 NOTE — ASSESSMENT & PLAN NOTE
Hx of CAD s/p PCI in 2012 with NARGIS to RCA and again in 2013 with NARGIS to LAD   Most recent nuclear stress test in 2/2019  Continue ASA, Lopressor, statin therapy

## 2021-05-08 NOTE — ASSESSMENT & PLAN NOTE
Lab Results   Component Value Date    EGFR 34 05/07/2021    EGFR 30 02/26/2021    EGFR 20 11/06/2020    CREATININE 1 47 (H) 05/07/2021    CREATININE 1 68 (H) 02/26/2021    CREATININE 2 35 (H) 11/06/2020     Cr stable today at 1 47  No VANNA, baseline ranges from 1 6-2 0   Monitor

## 2021-05-08 NOTE — PROGRESS NOTES
[facility] Progress Note  Patient: Mian Olivo 68 y o  female   MRN: [id number]  PCP: Anahi Montes MD  Unit/Bed#: [address] [place] [address] Encounter: [id number]  Date Of Visit: 05/08/21    Problem List:    Principal Problem:    Chest pain  Active Problems:    Essential hypertension    Coronary artery disease involving native coronary artery of native heart without angina pectoris    Abdominal aortic aneurysm (AAA) without rupture (HCC)    Hyperlipidemia    Stage 3b chronic kidney disease ([address] )      Assessment & Plan:    * Chest pain  Assessment & Plan  Patient presented to the ED with chest pain, no current chest pain but intermittent for the past few days  COSME score 3    - troponins negative  - CXR without acute abnormalities  - Monitor on telemetry  - EKG with T wave inversions in lateral leads, similar to previous EKG, LVH  - PRN nitro  - Continue Lopressor 25 mg BID  - follow-up pending echo  - Cardiology consult appreciated  Plan is for nuclear stress test on Monday      Stage 3b chronic kidney disease [facility])  Assessment & Plan  Lab Results   Component Value Date    EGFR 34 05/07/2021    EGFR 30 02/26/2021    EGFR 20 11/06/2020    CREATININE 1 47 (H) 05/07/2021    CREATININE 1 68 (H) 02/26/2021    CREATININE 2 35 (H) 11/06/2020     Cr stable at baseline  baseline ranges from 1 6-2 0   Monitor     Hyperlipidemia  Assessment & Plan  Continue statin therapy   Lipid panel from February 2021 shows LDL of 68    Abdominal aortic aneurysm (AAA) without rupture [facility])  Assessment & Plan  Noted on CT abdomen in 11/2020  Infrarenal AAA measuring 4 1 cm  Continue outpatient f/u with cardiology and routine imaging      Coronary artery disease involving native coronary artery of native heart without angina pectoris  Assessment & Plan  Hx of CAD s/p PCI in 2012 with NARGIS to RCA and again in 2013 with NARGIS to LAD   Most recent nuclear stress test in 2/2019, unable to view results   Continue ASA, Lopressor, statin therapy  Essential hypertension  Assessment & Plan  Continue Lopressor 25 mg BID  VTE Pharmacologic Prophylaxis:   Pharmacologic: Heparin  Mechanical VTE Prophylaxis in Place: Yes    Patient Centered Rounds: I have performed bedside rounds with nursing staff today  Discussions with Specialists or Other Care Team Provider: yes - cardio    Education and Discussions with Family / Patient: yes - daughter at bedside    Time Spent for Care: 30 minutes  More than 50% of total time spent on counseling and coordination of care as described above  Current Length of Stay: 0 day(s)    Current Patient Status: Inpatient   Certification Statement: The patient will continue to require additional inpatient hospital stay due to Chest pain requiring stress test    Discharge Plan: home    Code Status: Level 1 - Full Code      Subjective:   Patient denies any chest pain, shortness of breath  States that she needs to stay for testing    Objective:     Vitals:   Temp (24hrs), Av 5 °F (36 4 °C), Min:97 °F (36 1 °C), Max:97 8 °F (36 6 °C)    Temp:  [97 °F (36 1 °C)-97 8 °F (36 6 °C)] 97 7 °F (36 5 °C)  HR:  [47-66] 63  Resp:  [16-20] 18  BP: (113-178)/() 113/94  SpO2:  [96 %-99 %] 99 %  Body mass index is 24 34 kg/m²  Input and Output Summary (last 24 hours): Intake/Output Summary (Last 24 hours) at 2021 1357  Last data filed at 2021 0630  Gross per 24 hour   Intake 1000 ml   Output 300 ml   Net 700 ml       Physical Exam:     Physical Exam  Constitutional:       General: She is not in acute distress  Appearance: She is not diaphoretic  HENT:      Head: Normocephalic and atraumatic  Eyes:      General: No scleral icterus  Cardiovascular:      Rate and Rhythm: Normal rate and regular rhythm  Pulmonary:      Effort: Pulmonary effort is normal  No respiratory distress  Breath sounds: Normal breath sounds  No wheezing or rales     Abdominal:      General: Bowel sounds are normal  There is no distension  Palpations: Abdomen is soft  Tenderness: There is no abdominal tenderness  Musculoskeletal:      Comments: Trace pedal edema bilaterally   Neurological:      Mental Status: She is alert and oriented to person, place, and time  Comments: Forgetful         Additional Data:     Labs:    Results from last 7 days   Lab Units 05/08/21  0048 05/07/21  2045   WBC Thousand/uL  --  6 26   HEMOGLOBIN g/dL  --  13 0   HEMATOCRIT %  --  41 5   PLATELETS Thousands/uL 122* 146*   NEUTROS PCT %  --  57   LYMPHS PCT %  --  27   MONOS PCT %  --  11   EOS PCT %  --  4     Results from last 7 days   Lab Units 05/07/21  2045   POTASSIUM mmol/L 4 6   CHLORIDE mmol/L 108   CO2 mmol/L 32   BUN mg/dL 21   CREATININE mg/dL 1 47*   CALCIUM mg/dL 8 3   ALK PHOS U/L 95   ALT U/L 43   AST U/L 37           * I Have Reviewed All Lab Data Listed Above  * Additional Pertinent Lab Tests Reviewed: [facility] Admission Reviewed      Imaging:  Imaging Reports Reviewed Today Include: CXR  Imaging Personally Reviewed by Myself Includes:  N/A    Recent Cultures (last 7 days):           Last 24 Hours Medication List:   Current Facility-Administered Medications   Medication Dose Route Frequency Provider Last Rate    acetaminophen  650 mg Oral Q4H PRN Suri Loveless, PA-C      aspirin  162 mg Oral Daily Suri Loveless, PA-C      atorvastatin  10 mg Oral Daily Suri Loveless, PA-C      heparin (porcine)  5,000 Units Subcutaneous [facility] Suri Loveless, PA-C      metoprolol tartrate  25 mg Oral BID Suri Loveless, PA-C      morphine injection  2 mg Intravenous Q4H PRN Suri Loveless, PA-C      nitroglycerin  0 4 mg Sublingual Q5 Min PRN Suri Loveless, PA-C            Today, Patient Was Seen By: Jc Matson DO    ** Please Note: "This note has been constructed using a voice recognition system  Therefore there may be syntax, spelling, and/or grammatical errors   Please call if you have any questions  "**

## 2021-05-08 NOTE — ED PROVIDER NOTES
History  Chief Complaint   Patient presents with    Chest Pain     here with friend  neither one can tell much about what is going on  friend states pt has memory problem  had some pain in chest tonight which scared her, none now comes and goes     HPI    67 yo F hx of CAD (prior stents x2, LAD and rca occlusion in past) htn hld presents with chest pain  Onset at what time:  tonight  What was patient doing at time of pain: sitting  Duration: intermittent  Where: retrosternal  Radiation: nop  Quality: sharp    Prior similar Pain: yes    Associated:  SOB: no  Dyspnea with exertion: no  Diaphoresis: no  Nausea or Vomiting: no     Smoking: yes till Nov 2020  Drug usage: no    Given ASA prior to arrival: mo    Cardiologist: Dr Timi Patton          Prior to Admission Medications   Prescriptions Last Dose Informant Patient Reported? Taking?   aspirin 81 mg chewable tablet   Yes Yes   Sig: Chew 162 mg daily   atorvastatin (LIPITOR) 10 mg tablet   Yes Yes   Sig: Take 10 mg by mouth daily   metoprolol tartrate (LOPRESSOR) 25 mg tablet   Yes Yes   Sig: Take 25 mg by mouth 2 (two) times a day      Facility-Administered Medications: None       Past Medical History:   Diagnosis Date    Arthritis     Coronary artery disease     Hypertension        History reviewed  No pertinent surgical history  History reviewed  No pertinent family history  I have reviewed and agree with the history as documented  E-Cigarette/Vaping    E-Cigarette Use Never User      E-Cigarette/Vaping Substances     Social History     Tobacco Use    Smoking status: Former Smoker     Types: Cigarettes    Smokeless tobacco: Never Used   Substance Use Topics    Alcohol use: Not Currently    Drug use: Not Currently       Review of Systems   Constitutional: Negative for chills, fatigue and fever  HENT: Negative for sore throat  Eyes: Negative for redness and visual disturbance  Respiratory: Negative for cough and shortness of breath  Cardiovascular: Positive for chest pain  Gastrointestinal: Negative for abdominal pain, diarrhea and nausea  Genitourinary: Negative for difficulty urinating, dysuria and pelvic pain  Musculoskeletal: Negative for back pain  Skin: Negative for rash  Neurological: Negative for syncope, weakness and headaches  All other systems reviewed and are negative  Physical Exam  Physical Exam  Vitals signs and nursing note reviewed  Constitutional:       General: She is not in acute distress  HENT:      Head: Normocephalic and atraumatic  Eyes:      Conjunctiva/sclera: Conjunctivae normal    Neck:      Musculoskeletal: Normal range of motion  Cardiovascular:      Rate and Rhythm: Normal rate and regular rhythm  Heart sounds: Normal heart sounds  Pulmonary:      Effort: Pulmonary effort is normal  No respiratory distress  Breath sounds: Normal breath sounds  Abdominal:      General: Bowel sounds are normal       Palpations: Abdomen is soft  Tenderness: There is no abdominal tenderness  Musculoskeletal: Normal range of motion  Skin:     General: Skin is warm and dry  Findings: No rash  Neurological:      Mental Status: She is alert and oriented to person, place, and time  Cranial Nerves: No cranial nerve deficit  Sensory: No sensory deficit  Motor: No abnormal muscle tone        Coordination: Coordination normal          Vital Signs  ED Triage Vitals [05/07/21 2037]   Temperature Pulse Respirations Blood Pressure SpO2   (!) 97 °F (36 1 °C) 64 20 154/78 96 %      Temp Source Heart Rate Source Patient Position - Orthostatic VS BP Location FiO2 (%)   Tympanic Monitor Sitting Right arm --      Pain Score       No Pain           Vitals:    05/07/21 2115 05/07/21 2145 05/07/21 2221 05/07/21 2253   BP: 162/77 158/75 (!) 178/80 (!) 175/102   Pulse: 60 60 65 66   Patient Position - Orthostatic VS:   Lying Sitting         Visual Acuity      ED Medications  Medications aspirin chewable tablet 162 mg (has no administration in time range)   atorvastatin (LIPITOR) tablet 10 mg (has no administration in time range)   metoprolol tartrate (LOPRESSOR) tablet 25 mg (has no administration in time range)   nitroglycerin (NITROSTAT) SL tablet 0 4 mg (has no administration in time range)   heparin (porcine) subcutaneous injection 5,000 Units (has no administration in time range)   acetaminophen (TYLENOL) tablet 650 mg (has no administration in time range)   morphine injection 2 mg (has no administration in time range)   sodium chloride 0 9 % bolus 1,000 mL (0 mL Intravenous Stopped 5/7/21 2221)   aspirin tablet 325 mg (325 mg Oral Given 5/7/21 2219)       Diagnostic Studies  Results Reviewed     Procedure Component Value Units Date/Time    Comprehensive metabolic panel [[id number]]  (Abnormal) Collected: 05/07/21 2045    Lab Status: Final result Specimen: Blood from Arm, Right Updated: 05/07/21 2111     Sodium 144 mmol/L      Potassium 4 6 mmol/L      Chloride 108 mmol/L      CO2 32 mmol/L      ANION GAP 4 mmol/L      BUN 21 mg/dL      Creatinine 1 47 mg/dL      Glucose 95 mg/dL      Calcium 8 3 mg/dL      Corrected Calcium 8 9 mg/dL      AST 37 U/L      ALT 43 U/L      Alkaline Phosphatase 95 U/L      Total Protein 6 8 g/dL      Albumin 3 3 g/dL      Total Bilirubin 0 28 mg/dL      eGFR 34 ml/min/1 73sq m     Narrative:      [facility] guidelines for Chronic Kidney Disease (CKD):     Stage 1 with normal or high GFR (GFR > 90 mL/min/1 73 square meters)    Stage 2 Mild CKD (GFR = 60-89 mL/min/1 73 square meters)    Stage 3A Moderate CKD (GFR = 45-59 mL/min/1 73 square meters)    Stage 3B Moderate CKD (GFR = 30-44 mL/min/1 73 square meters)    Stage 4 Severe CKD (GFR = 15-29 mL/min/1 73 square meters)    Stage 5 End Stage CKD (GFR <15 mL/min/1 73 square meters)  Note: GFR calculation is accurate only with a steady state creatinine    Troponin I [[id number]] (Normal) Collected: 05/07/21 2045    Lab Status: Final result Specimen: Blood from Arm, Right Updated: 05/07/21 2109     Troponin I <0 02 ng/mL     CBC and differential [[id number]]  (Abnormal) Collected: 05/07/21 2045    Lab Status: Final result Specimen: Blood from Arm, Right Updated: 05/07/21 2049     WBC 6 26 Thousand/uL      RBC 4 16 Million/uL      Hemoglobin 13 0 g/dL      Hematocrit 41 5 %       fL      MCH 31 3 pg      MCHC 31 3 g/dL      RDW 14 4 %      MPV 11 2 fL      Platelets 460 Thousands/uL      nRBC 0 /100 WBCs      Neutrophils Relative 57 %      Immat GRANS % 0 %      Lymphocytes Relative 27 %      Monocytes Relative 11 %      Eosinophils Relative 4 %      Basophils Relative 1 %      Neutrophils Absolute 3 58 Thousands/µL      Immature Grans Absolute 0 02 Thousand/uL      Lymphocytes Absolute 1 70 Thousands/µL      Monocytes Absolute 0 68 Thousand/µL      Eosinophils Absolute 0 23 Thousand/µL      Basophils Absolute 0 05 Thousands/µL                  XR chest 1 view portable    (Results Pending)              Procedures  Procedures         ED Course  ED Course as of May 08 0021   Fri May 07, 2021   2059 EKG: t wave inversions V2 - V6  I  Seen on prior ekg       2125 Troponin I: <0 02             HEART Risk Score      Most Recent Value   Heart Score Risk Calculator   History  2 Filed at: 05/07/2021 2042   ECG  0 Filed at: 05/07/2021 2042   Age  2 Filed at: 05/07/2021 2042   Risk Factors  2 Filed at: 05/07/2021 2042   Troponin  0 Filed at: 05/07/2021 2042   HEART Score  6 Filed at: 05/07/2021 2042                      SBIRT 22yo+      Most Recent Value   SBIRT (25 yo +)   In order to provide better care to our patients, we are screening all of our patients for alcohol and drug use  Would it be okay to ask you these screening questions? Yes Filed at: 05/07/2021 2112   Initial Alcohol Screen: US AUDIT-C    1  How often do you have a drink containing alcohol?  0 Filed at: 05/07/2021 2112   2   How many drinks containing alcohol do you have on a typical day you are drinking? 0 Filed at: 05/07/2021 2112   3a  Male UNDER 65: How often do you have five or more drinks on one occasion? 0 Filed at: 05/07/2021 2112   3b  FEMALE Any Age, or MALE 65+: How often do you have 4 or more drinks on one occassion? 0 Filed at: 05/07/2021 2112   Audit-C Score  0 Filed at: 05/07/2021 2112   SAGE: How many times in the past year have you    Used an illegal drug or used a prescription medication for non-medical reasons? Never Filed at: 05/07/2021 2111        COMSE Risk Score      Most Recent Value   Age >= 72  1 Filed at: 05/07/2021 2231   Known CAD (stenosis >= 50%)  --   Recent (<=24 hrs) Service Angina  1 Filed at: 05/07/2021 2231   ST Deviation >= 0 5 mm  0 Filed at: 05/07/2021 2231   3+ CAD Risk Factors (FHx, HTN, HLP, DM, Smoker)  --   Aspirin Use Past 7 Days  1 Filed at: 05/07/2021 2231   Elevated Cardiac Markers  0 Filed at: 05/07/2021 2231   COSME Risk Score (Calculated)  --              MDM    Chest pain  Heart score 6  No acute st changes  Trop neg  Admitted to medicine for tele monitoring  Disposition  Final diagnoses:   Chest pain   T wave inversion in EKG     Time reflects when diagnosis was documented in both MDM as applicable and the Disposition within this note     Time User Action Codes Description Comment    5/7/2021 10:14  [address], [address] [R07 9] Chest pain     5/7/2021 10:14 PM Lori Starkey Add [R94 31] T wave inversion in EKG     5/7/2021 10:41 PM Drake Sloan Add [I25 10] Coronary artery disease involving native coronary artery of native heart without angina pectoris       ED Disposition     ED Disposition Condition Date/Time Comment    Admit Stable Fri May 7, 2021 10:14 PM Case was discussed with Isabel and the patient's admission status was agreed to be Admission Status: observation status to the service of Dr Lizbet Mclaughlin          Follow-up Information    None         Current Discharge Medication List      CONTINUE these medications which have NOT CHANGED    Details   aspirin 81 mg chewable tablet Chew 162 mg daily      atorvastatin (LIPITOR) 10 mg tablet Take 10 mg by mouth daily      metoprolol tartrate (LOPRESSOR) 25 mg tablet Take 25 mg by mouth 2 (two) times a day           No discharge procedures on file      PDMP Review     None          ED Provider  Electronically Signed by           Andria Diaz MD  05/08/21 5959

## 2021-05-08 NOTE — PLAN OF CARE
Problem: Potential for Falls  Goal: Patient will remain free of falls  Description: INTERVENTIONS:  - Assess patient frequently for physical needs  -  Identify cognitive and physical deficits and behaviors that affect risk of falls  -  [place] fall precautions as indicated by assessment   - Educate patient/family on patient safety including physical limitations  - Instruct patient to call for assistance with activity based on assessment  - Modify environment to reduce risk of injury  - Consider OT/PT consult to assist with strengthening/mobility  Outcome: Progressing     Problem: NEUROSENSORY - ADULT  Goal: Achieves stable or improved neurological status  Description: INTERVENTIONS  - Monitor and report changes in neurological status  - Monitor vital signs such as temperature, blood pressure, glucose, and any other labs ordered   - Initiate measures to prevent increased intracranial pressure  - Monitor for seizure activity and implement precautions if appropriate      Outcome: Progressing  Goal: Remains free of injury related to seizures activity  Description: INTERVENTIONS  - Maintain airway, patient safety  and administer oxygen as ordered  - Monitor patient for seizure activity, document and report duration and description of seizure to physician/advanced practitioner  - If seizure occurs,  ensure patient safety during seizure  - Reorient patient post seizure  - Seizure pads on all 4 side rails  - Instruct patient/family to notify RN of any seizure activity including if an aura is experienced  - Instruct patient/family to call for assistance with activity based on nursing assessment  - Administer anti-seizure medications if ordered    Outcome: Progressing  Goal: Achieves maximal functionality and self care  Description: INTERVENTIONS  - Monitor swallowing and airway patency with patient fatigue and changes in neurological status  - Encourage and assist patient to increase activity and self care     - Encourage visually impaired, hearing impaired and aphasic patients to use assistive/communication devices  Outcome: Progressing     Problem: CARDIOVASCULAR - ADULT  Goal: Maintains optimal cardiac output and hemodynamic stability  Description: INTERVENTIONS:  - Monitor I/O, vital signs and rhythm  - Monitor for S/S and trends of decreased cardiac output  - Administer and titrate ordered vasoactive medications to optimize hemodynamic stability  - Assess quality of pulses, skin color and temperature  - Assess for signs of decreased coronary artery perfusion  - Instruct patient to report change in severity of symptoms  Outcome: Progressing  Goal: Absence of cardiac dysrhythmias or at baseline rhythm  Description: INTERVENTIONS:  - Continuous cardiac monitoring, vital signs, obtain 12 lead EKG if ordered  - Administer antiarrhythmic and heart rate control medications as ordered  - Monitor electrolytes and administer replacement therapy as ordered  Outcome: Progressing     Problem: RESPIRATORY - ADULT  Goal: Achieves optimal ventilation and oxygenation  Description: INTERVENTIONS:  - Assess for changes in respiratory status  - Assess for changes in mentation and behavior  - Position to facilitate oxygenation and minimize respiratory effort  - Oxygen administered by appropriate delivery if ordered  - Initiate smoking cessation education as indicated  - Encourage broncho-pulmonary hygiene including cough, deep breathe, Incentive Spirometry  - Assess the need for suctioning and aspirate as needed  - Assess and instruct to report SOB or any respiratory difficulty  - Respiratory Therapy support as indicated  Outcome: Progressing     Problem: GASTROINTESTINAL - ADULT  Goal: Minimal or absence of nausea and/or vomiting  Description: INTERVENTIONS:  - Administer IV fluids if ordered to ensure adequate hydration  - Maintain NPO status until nausea and vomiting are resolved  - Nasogastric tube if ordered  - Administer ordered antiemetic medications as needed  - Provide nonpharmacologic comfort measures as appropriate  - Advance diet as tolerated, if ordered  - Consider nutrition services referral to assist patient with adequate nutrition and appropriate food choices  Outcome: Progressing  Goal: Maintains or returns to baseline bowel function  Description: INTERVENTIONS:  - Assess bowel function  - Encourage oral fluids to ensure adequate hydration  - Administer IV fluids if ordered to ensure adequate hydration  - Administer ordered medications as needed  - Encourage mobilization and activity  - Consider nutritional services referral to assist patient with adequate nutrition and appropriate food choices  Outcome: Progressing  Goal: Maintains adequate nutritional intake  Description: INTERVENTIONS:  - Monitor percentage of each meal consumed  - Identify factors contributing to decreased intake, treat as appropriate  - Assist with meals as needed  - Monitor I&O, weight, and lab values if indicated  - Obtain nutrition services referral as needed  Outcome: Progressing  Goal: Establish and maintain optimal ostomy function  Description: INTERVENTIONS:  - Assess bowel function  - Encourage oral fluids to ensure adequate hydration  - Administer IV fluids if ordered to ensure adequate hydration   - Administer ordered medications as needed  - Encourage mobilization and activity  - Nutrition services referral to assist patient with appropriate food choices  - Assess stoma site  - Consider wound care consult   Outcome: Progressing     Problem: GENITOURINARY - ADULT  Goal: Maintains or returns to baseline urinary function  Description: INTERVENTIONS:  - Assess urinary function  - Encourage oral fluids to ensure adequate hydration if ordered  - Administer IV fluids as ordered to ensure adequate hydration  - Administer ordered medications as needed  - Offer frequent toileting  - Follow urinary retention protocol if ordered  Outcome: Progressing  Goal: Absence of urinary retention  Description: INTERVENTIONS:  - Assess patients ability to void and empty bladder  - Monitor I/O  - Bladder scan as needed  - Discuss with physician/AP medications to alleviate retention as needed  - Discuss catheterization for long term situations as appropriate  Outcome: Progressing     Problem: METABOLIC, FLUID AND ELECTROLYTES - ADULT  Goal: Electrolytes maintained within normal limits  Description: INTERVENTIONS:  - Monitor labs and assess patient for signs and symptoms of electrolyte imbalances  - Administer electrolyte replacement as ordered  - Monitor response to electrolyte replacements, including repeat lab results as appropriate  - Instruct patient on fluid and nutrition as appropriate  Outcome: Progressing  Goal: Fluid balance maintained  Description: INTERVENTIONS:  - Monitor labs   - Monitor I/O and WT  - Instruct patient on fluid and nutrition as appropriate  - Assess for signs & symptoms of volume excess or deficit  Outcome: Progressing  Goal: Glucose maintained within target range  Description: INTERVENTIONS:  - Monitor Blood Glucose as ordered  - Assess for signs and symptoms of hyperglycemia and hypoglycemia  - Administer ordered medications to maintain glucose within target range  - Assess nutritional intake and initiate nutrition service referral as needed  Outcome: Progressing     Problem: SKIN/TISSUE INTEGRITY - ADULT  Goal: Skin integrity remains intact  Description: INTERVENTIONS  - Identify patients at risk for skin breakdown  - Assess and monitor skin integrity  - Assess and monitor nutrition and hydration status  - Monitor labs (i e  albumin)  - Assess for incontinence   - Turn and reposition patient  - Assist with mobility/ambulation  - Relieve pressure over bony prominences  - Avoid friction and shearing  - Provide appropriate hygiene as needed including keeping skin clean and dry  - Evaluate need for skin moisturizer/barrier cream  - Collaborate with interdisciplinary team (i e  Nutrition, Rehabilitation, etc )   - Patient/family teaching  Outcome: Progressing  Goal: Oral mucous membranes remain intact  Description: INTERVENTIONS  - Assess oral mucosa and hygiene practices  - Implement preventative oral hygiene regimen  - Implement oral medicated treatments as ordered  - Initiate Nutrition services referral as needed  Outcome: Progressing     Problem: HEMATOLOGIC - ADULT  Goal: Maintains hematologic stability  Description: INTERVENTIONS  - Assess for signs and symptoms of bleeding or hemorrhage  - Monitor labs  - Administer supportive blood products/factors as ordered and appropriate  Outcome: Progressing     Problem: MUSCULOSKELETAL - ADULT  Goal: Maintain or return mobility to safest level of function  Description: INTERVENTIONS:  - Assess patient's ability to carry out ADLs; assess patient's baseline for ADL function and identify physical deficits which impact ability to perform ADLs (bathing, care of mouth/teeth, toileting, grooming, dressing, etc )  - Assess/evaluate cause of self-care deficits   - Assess range of motion  - Assess patient's mobility  - Assess patient's need for assistive devices and provide as appropriate  - Encourage maximum independence but intervene and supervise when necessary  - Involve family in performance of ADLs  - Assess for home care needs following discharge   - Consider OT consult to assist with ADL evaluation and planning for discharge  - Provide patient education as appropriate  Outcome: Progressing  Goal: Maintain proper alignment of affected body part  Description: INTERVENTIONS:  - Support, maintain and protect limb and body alignment  - Provide patient/ family with appropriate education  Outcome: Progressing

## 2021-05-09 LAB
ANION GAP SERPL CALCULATED.3IONS-SCNC: 6 MMOL/L (ref 4–13)
BUN SERPL-MCNC: 25 MG/DL (ref 5–25)
CALCIUM SERPL-MCNC: 8.3 MG/DL (ref 8.3–10.1)
CHLORIDE SERPL-SCNC: 107 MMOL/L (ref 100–108)
CO2 SERPL-SCNC: 26 MMOL/L (ref 21–32)
CREAT SERPL-MCNC: 1.36 MG/DL (ref 0.6–1.3)
ERYTHROCYTE [DISTWIDTH] IN BLOOD BY AUTOMATED COUNT: 14.2 % (ref 11.6–15.1)
GFR SERPL CREATININE-BSD FRML MDRD: 38 ML/MIN/1.73SQ M
GLUCOSE SERPL-MCNC: 84 MG/DL (ref 65–140)
HCT VFR BLD AUTO: 40.1 % (ref 34.8–46.1)
HGB BLD-MCNC: 12.4 G/DL (ref 11.5–15.4)
MCH RBC QN AUTO: 30.8 PG (ref 26.8–34.3)
MCHC RBC AUTO-ENTMCNC: 30.9 G/DL (ref 31.4–37.4)
MCV RBC AUTO: 100 FL (ref 82–98)
PLATELET # BLD AUTO: 135 THOUSANDS/UL (ref 149–390)
PMV BLD AUTO: 11.2 FL (ref 8.9–12.7)
POTASSIUM SERPL-SCNC: 4.2 MMOL/L (ref 3.5–5.3)
POTASSIUM SERPL-SCNC: 6.4 MMOL/L (ref 3.5–5.3)
RBC # BLD AUTO: 4.02 MILLION/UL (ref 3.81–5.12)
SODIUM SERPL-SCNC: 139 MMOL/L (ref 136–145)
WBC # BLD AUTO: 4.89 THOUSAND/UL (ref 4.31–10.16)

## 2021-05-09 PROCEDURE — 85027 COMPLETE CBC AUTOMATED: CPT | Performed by: FAMILY MEDICINE

## 2021-05-09 PROCEDURE — 99232 SBSQ HOSP IP/OBS MODERATE 35: CPT | Performed by: INTERNAL MEDICINE

## 2021-05-09 PROCEDURE — 93005 ELECTROCARDIOGRAM TRACING: CPT

## 2021-05-09 PROCEDURE — 80048 BASIC METABOLIC PNL TOTAL CA: CPT | Performed by: FAMILY MEDICINE

## 2021-05-09 PROCEDURE — 99232 SBSQ HOSP IP/OBS MODERATE 35: CPT | Performed by: FAMILY MEDICINE

## 2021-05-09 PROCEDURE — 84132 ASSAY OF SERUM POTASSIUM: CPT | Performed by: FAMILY MEDICINE

## 2021-05-09 RX ADMIN — ATORVASTATIN CALCIUM 10 MG: 10 TABLET, FILM COATED ORAL at 09:01

## 2021-05-09 RX ADMIN — METOPROLOL TARTRATE 25 MG: 50 TABLET, FILM COATED ORAL at 18:45

## 2021-05-09 RX ADMIN — HEPARIN SODIUM 5000 UNITS: 5000 INJECTION INTRAVENOUS; SUBCUTANEOUS at 05:58

## 2021-05-09 RX ADMIN — HEPARIN SODIUM 5000 UNITS: 5000 INJECTION INTRAVENOUS; SUBCUTANEOUS at 14:41

## 2021-05-09 RX ADMIN — ASPIRIN 81 MG CHEWABLE TABLET 162 MG: 81 TABLET CHEWABLE at 09:00

## 2021-05-09 RX ADMIN — HEPARIN SODIUM 5000 UNITS: 5000 INJECTION INTRAVENOUS; SUBCUTANEOUS at 21:22

## 2021-05-09 RX ADMIN — METOPROLOL TARTRATE 25 MG: 50 TABLET, FILM COATED ORAL at 09:00

## 2021-05-09 NOTE — PROGRESS NOTES
[facility] Internal Medicine Progress Note  Patient: Paula Perez 68 y o  female   MRN: [id number]  PCP: Elba Bal MD  Unit/Bed#: [address] [place] [address] Encounter: [id number]  Date Of Visit: 05/09/21    Problem List:    Principal Problem:    Chest pain  Active Problems:    Essential hypertension    Coronary artery disease involving native coronary artery of native heart without angina pectoris    Abdominal aortic aneurysm (AAA) without rupture (HCC)    Hyperlipidemia    Stage 3b chronic kidney disease ([facility] [address] )      Assessment & Plan:    * Chest pain  Assessment & Plan  Patient presented to the ED with chest pain, no current chest pain but intermittent for the past few days  COSME score 3    - troponins negative  - CXR without acute abnormalities  - Monitor on telemetry  - EKG with T wave inversions in lateral leads, similar to previous EKG, LVH  - PRN nitro  - Continue Lopressor 25 mg BID  - echo showed EF of 50-55% with moderately dilated left and right atrium, mild-to-moderate mitral valve regurgitation and moderate tricuspid valve regurgitation  - Cardiology consult appreciated  Plan is for nuclear stress test on Monday      Stage 3b chronic kidney disease [facility])  Assessment & Plan  Lab Results   Component Value Date    EGFR 38 05/09/2021    EGFR 34 05/07/2021    EGFR 30 02/26/2021    CREATININE 1 36 (H) 05/09/2021    CREATININE 1 47 (H) 05/07/2021    CREATININE 1 68 (H) 02/26/2021     Cr stable at baseline  baseline ranges from 1 6-2 0   Monitor  Hyperlipidemia  Assessment & Plan  Continue statin therapy    Lipid panel from February 2021 shows LDL of 68    Abdominal aortic aneurysm (AAA) without rupture [facility])  Assessment & Plan  Noted on CT abdomen in 11/2020  Infrarenal AAA measuring 4 1 cm  Continue outpatient f/u with cardiology and routine imaging    Coronary artery disease involving native coronary artery of native heart without angina pectoris  Assessment & Plan  Hx of CAD s/p PCI in 2012 with NARGIS to RCA and again in  with NARGIS to LAD   Most recent nuclear stress test in 2019, unable to view results   Continue ASA, Lopressor, statin therapy    Essential hypertension  Assessment & Plan  Continue Lopressor 25 mg BID        VTE Pharmacologic Prophylaxis:   Pharmacologic: Heparin  Mechanical VTE Prophylaxis in Place: Yes    Patient Centered Rounds: I have performed bedside rounds with nursing staff today  Discussions with Specialists or Other Care Team Provider: yes - cardio    Education and Discussions with Family / Patient: yes - Zulay Huntley    Time Spent for Care: 20 minutes  More than 50% of total time spent on counseling and coordination of care as described above  Current Length of Stay: 1 day(s)    Current Patient Status: Inpatient   Certification Statement: The patient will continue to require additional inpatient hospital stay due to Chest pain needing stress test    Discharge Plan: home    Code Status: Level 1 - Full Code      Subjective:     Patient denies any chest pain, shortness of breath    Objective:     Vitals:   Temp (24hrs), Av 6 °F (36 4 °C), Min:97 4 °F (36 3 °C), Max:97 9 °F (36 6 °C)    Temp:  [97 4 °F (36 3 °C)-97 9 °F (36 6 °C)] 97 4 °F (36 3 °C)  HR:  [55-63] 57  Resp:  [14-19] 18  BP: (128-149)/(76-84) 128/81  SpO2:  [90 %-98 %] 95 %  Body mass index is 24 34 kg/m²  Input and Output Summary (last 24 hours): Intake/Output Summary (Last 24 hours) at 2021 0944  Last data filed at 2021 1801  Gross per 24 hour   Intake 480 ml   Output 650 ml   Net -170 ml       Physical Exam:     Physical Exam  Constitutional:       General: She is not in acute distress  Appearance: She is not diaphoretic  HENT:      Head: Normocephalic and atraumatic  Eyes:      General: No scleral icterus  Cardiovascular:      Rate and Rhythm: Normal rate and regular rhythm  Pulmonary:      Effort: Pulmonary effort is normal  No respiratory distress        Breath sounds: Normal breath sounds  No wheezing or rales  Abdominal:      General: Bowel sounds are normal  There is no distension  Palpations: Abdomen is soft  Tenderness: There is no abdominal tenderness  Musculoskeletal:      Right lower leg: No edema  Left lower leg: No edema  Neurological:      Mental Status: She is alert  Comments: Forgetful         Additional Data:     Labs:    Results from last 7 days   Lab Units 05/09/21  0557  05/07/21  2045   WBC Thousand/uL 4 89  --  6 26   HEMOGLOBIN g/dL 12 4  --  13 0   HEMATOCRIT % 40 1  --  41 5   PLATELETS Thousands/uL 135*   < > 146*   NEUTROS PCT %  --   --  57   LYMPHS PCT %  --   --  27   MONOS PCT %  --   --  11   EOS PCT %  --   --  4    < > = values in this interval not displayed  Results from last 7 days   Lab Units 05/09/21  0557 05/07/21  2045   POTASSIUM mmol/L 6 4* 4 6   CHLORIDE mmol/L 107 108   CO2 mmol/L 26 32   BUN mg/dL 25 21   CREATININE mg/dL 1 36* 1 47*   CALCIUM mg/dL 8 3 8 3   ALK PHOS U/L  --  95   ALT U/L  --  43   AST U/L  --  37           * I Have Reviewed All Lab Data Listed Above  * Additional Pertinent Lab Tests Reviewed:  [facility] Admission Reviewed      Imaging:  Imaging Reports Reviewed Today Include: CXR  Imaging Personally Reviewed by Myself Includes:  N/A    Recent Cultures (last 7 days):           Last 24 Hours Medication List:   Current Facility-Administered Medications   Medication Dose Route Frequency Provider Last Rate    acetaminophen  650 mg Oral Q4H PRN Elena Castro PA-C      aspirin  162 mg Oral Daily Elena Castro PA-C      atorvastatin  10 mg Oral Daily Elena Castro PA-C      heparin (porcine)  5,000 Units Subcutaneous [facility] Elena Castro PA-C      metoprolol tartrate  25 mg Oral BID Elena Castro PA-C      morphine injection  2 mg Intravenous Q4H PRN Elena Castro PA-C      nitroglycerin  0 4 mg Sublingual Q5 Min PRN Elena Castro PA-C            Today, Patient Was Seen By: Omar Pnio, DO    ** Please Note: "This note has been constructed using a voice recognition system  Therefore there may be syntax, spelling, and/or grammatical errors   Please call if you have any questions  "**

## 2021-05-09 NOTE — PROGRESS NOTES
Cardiology Inpatient Progress Note  [facility]   [facility]  1945  [id number]  PCP: Roberto Carlos Amaro MD  Date of Admission:  5/7/2021    Assessment/Plan:   Chest pain in the setting of coronary artery disease with prior PCI-  Ruled out for acute coronary syndrome  Patient was having worsening symptoms at home  Plan for nuclear stress test on Monday  If negative continue optimal medical therapy  Palpitations-  His sinus rhythm on telemetry noted with frequent APCs  Continue beta-blocker therapy    Mild to moderate mitral regurgitation and tricuspid regurgitation    Bereavement anxiety--  Recent loss of her son  Prior smoker    Subjective:    feel some palpitations  Denies having recurrence of jaw her chest discomfort  Daughter with her  Review of Systems:   CONSTITUTIONAL:  As per hpi  HEENT:  Eyes:  No visual loss, blurred vision, double vision or yellow sclerae  Ears, Nose, Throat:  No hearing loss, sneezing, congestion, runny nose or sore throat  SKIN:  No rash or itching  CARDIOVASCULAR:  As per hpi  RESPIRATORY:  As per hpi  GASTROINTESTINAL:  No anorexia, nausea, vomiting or diarrhea  No abdominal pain or blood  GENITOURINARY:  Burning on urination  No flank pain  NEUROLOGICAL:  No headache, dizziness, syncope, paralysis, ataxia, numbness or tingling in the extremities  No change in bowel or bladder control  MUSCULOSKELETAL:  No muscle, back pain, joint pain or stiffness  HEMATOLOGIC:  No anemia, bleeding or bruising  LYMPHATICS:  No enlarged nodes  No history of splenectomy  PSYCHIATRIC:  No active suicidal or homicidal ideation  ENDOCRINOLOGIC:  No reports of sweating, cold or heat intolerance  No polyuria or polydipsia  ALLERGIES:  No history of asthma, hives, eczema or rhinitis  More than 10 systems reviewed and otherwise noncontributory      Vitals: Blood pressure 128/81, pulse 57, temperature (!) 97 4 °F (36 3 °C), temperature source Oral, resp  rate 18, height 5' 8" (1 727 m), weight 72 6 kg (160 lb 0 9 oz), SpO2 95 %  Vitals:    05/07/21 2037 05/07/21 2253   Weight: 70 8 kg (156 lb) 72 6 kg (160 lb 0 9 oz)     Body mass index is 24 34 kg/m²  BP Readings from Last 3 Encounters:   05/09/21 128/81   04/28/20 106/55   12/28/16 148/88     Orthostatic Blood Pressures      Most Recent Value   Blood Pressure  128/81 filed at 05/09/2021 0728   Patient Position - Orthostatic VS  Lying filed at 05/09/2021 0728        I/O       05/07 0701 - 05/08 0700 05/08 0701 - 05/09 0700 05/09 0701 - 05/10 0700    P  O   720     IV Piggyback 1000      Total Intake(mL/kg) 1000 (13 8) 720 (9 9)     Urine (mL/kg/hr) 300 650 (0 4) 200 (0 4)    Total Output 300 650 200    Net +700 +70 -200               Invasive Devices     Peripheral Intravenous Line            Peripheral IV 05/07/21 Right Antecubital 1 day                  Intake/Output Summary (Last 24 hours) at 5/9/2021 1414  Last data filed at 5/9/2021 1039  Gross per 24 hour   Intake 240 ml   Output 600 ml   Net -360 ml     Physical Examination:   Physical Exam  Constitutional:       General: She is not in acute distress  Appearance: She is well-developed  She is not diaphoretic  HENT:      Head: Normocephalic and atraumatic  Right Ear: External ear normal       Left Ear: External ear normal    Eyes:      General: No scleral icterus  Right eye: No discharge  Left eye: No discharge  Conjunctiva/sclera: Conjunctivae normal       Pupils: Pupils are equal, round, and reactive to light  Neck:      Musculoskeletal: Normal range of motion and neck supple  Thyroid: No thyromegaly  Vascular: No JVD  Trachea: No tracheal deviation  Cardiovascular:      Rate and Rhythm: Normal rate and regular rhythm  Heart sounds: Murmur present  No friction rub  No gallop  Pulmonary:      Effort: Pulmonary effort is normal  No respiratory distress  Breath sounds: Normal breath sounds  No stridor  No wheezing or rales  Chest:      Chest wall: No tenderness  Abdominal:      General: Bowel sounds are normal  There is no distension  Palpations: Abdomen is soft  There is no mass  Tenderness: There is no abdominal tenderness  There is no guarding or rebound  Musculoskeletal: Normal range of motion  General: No tenderness or deformity  Skin:     General: Skin is warm and dry  Coloration: Skin is not pale  Findings: No erythema or rash  Neurological:      Mental Status: She is alert and oriented to person, place, and time  Cranial Nerves: No cranial nerve deficit  Motor: No abnormal muscle tone  Coordination: Coordination normal       Deep Tendon Reflexes: Reflexes are normal and symmetric  Reflexes normal    Psychiatric:         Behavior: Behavior normal          Thought Content: Thought content normal          Judgment: Judgment normal          Labs:   Lab Results:  I Have Reviewed All Lab Data Below:  CBC with diff:  Results from last 7 days   Lab Units 05/09/21  0557 05/08/21  0048 05/07/21  2045   WBC Thousand/uL 4 89  --  6 26   HEMOGLOBIN g/dL 12 4  --  13 0   HEMATOCRIT % 40 1  --  41 5   MCV fL 100*  --  100*   PLATELETS Thousands/uL 135* 122* 146*   MCH pg 30 8  --  31 3   MCHC g/dL 30 9*  --  31 3*   RDW % 14 2  --  14 4   MPV fL 11 2 10 7 11 2   NRBC AUTO /100 WBCs  --   --  0       CMP:  Results from last 7 days   Lab Units 05/09/21  1106 05/09/21  0557 05/07/21  2045   SODIUM mmol/L  --  139 144   POTASSIUM mmol/L 4 2 6 4* 4 6   CHLORIDE mmol/L  --  107 108   CO2 mmol/L  --  26 32   ANION GAP mmol/L  --  6 4   BUN mg/dL  --  25 21   CREATININE mg/dL  --  1 36* 1 47*   CALCIUM mg/dL  --  8 3 8 3   AST U/L  --   --  37   ALT U/L  --   --  43   ALK PHOS U/L  --   --  95   TOTAL PROTEIN g/dL  --   --  6 8   ALBUMIN g/dL  --   --  3 3*   TOTAL BILIRUBIN mg/dL  --   --  0 28   EGFR ml/min/1 73sq m  --  38 34     Magnesium:      Coags:    TSH: Lipid Profile:    Hgb A1c:    NT-proBNP:   Recent Labs     21  0048   NTBNP 4,983*        Troponins:  Results from last 7 days   Lab Units 21  0524 21  0048 21  2045   TROPONIN I ng/mL <0 02 <0 02 <0 02       Imaging & Testing   I have personally reviewed pertinent reports  Cardiac Testing     Results for orders placed during the hospital encounter of 21   Echo complete with contrast if indicated    Narrative [address]  [address] [facility]  [phone]    Transthoracic Echocardiogram  2D, M-mode, Doppler, and Color Doppler    Study date:  08-May-2021    Patient: aJren Vines  MR number: [id number]  Account number: [de-identified]  : 1945  Age: 68 years  Gender: Female  Status: Inpatient  Location: Bedside  Height: 68 in  Weight: 159 7 lb  BP: 114/ 64 mmHg    Indications: CAD, Chest Pain    Diagnoses: I25 83 - Coronary atherosclerosis due to lipid rich plaque, R07 9 - Chest pain, unspecified    Sonographer:  Andrew Swenson RDCS  Primary Physician:  Acosta Ramirez MD  Referring Physician:  Jonh Garcia PA-C  Group:  [id number]  Ordering Physician:  Jonh Garcia PA-C  Interpreting Physician:  Fede Monroe MD    SUMMARY    LEFT VENTRICLE:  Systolic function was normal  Ejection fraction was estimated in the range of 50 % to 55 %  There were no regional wall motion abnormalities  LEFT ATRIUM:  The atrium was moderately dilated measuring 26cm2    RIGHT ATRIUM:  The atrium was moderately dilated measuring 27cm2    MITRAL VALVE:  There was mild to moderate regurgitation  Regurgitation grade was 1-2+ on a scale of 0 to 4+  AORTIC VALVE:  There was trace regurgitation  TRICUSPID VALVE:  There was moderate regurgitation  PULMONIC VALVE:  There was mild regurgitation      HISTORY: PRIOR HISTORY: HTN, AAA w/o rupture, HLD, Chronic Kidney Disease    PROCEDURE: The procedure was performed at the bedside  This was a routine study  The transthoracic approach was used  The study included complete 2D imaging, M-mode, complete spectral Doppler, and color Doppler  The heart rate was 57 bpm,  at the start of the study  Image quality was adequate  LEFT VENTRICLE: Size was normal  Systolic function was normal  Ejection fraction was estimated in the range of 50 % to 55 %  There were no regional wall motion abnormalities  Wall thickness was normal  No evidence of apical thrombus  DOPPLER: The study was not technically sufficient to allow evaluation of LV diastolic function  RIGHT VENTRICLE: The size was normal  Systolic function was normal with TAPSE-2 9cm Wall thickness was normal     LEFT ATRIUM: The atrium was moderately dilated measuring 26cm2    RIGHT ATRIUM: The atrium was moderately dilated measuring 27cm2    MITRAL VALVE: There was mild thickening  There was normal leaflet separation  DOPPLER: The transmitral velocity was within the normal range  There was no evidence for stenosis  There was mild to moderate regurgitation  Regurgitation grade  was 1-2+ on a scale of 0 to 4+  The regurgitant jet was centrally directed  AORTIC VALVE: The valve was trileaflet  Leaflets exhibited mildly increased thickness, mild calcification, and normal cuspal separation  DOPPLER: Transaortic velocity was within the normal range  There was no evidence for stenosis  There  was trace regurgitation  TRICUSPID VALVE: There was mild thickening  There was normal leaflet separation  DOPPLER: The transtricuspid velocity was within the normal range  There was no evidence for stenosis  There was moderate regurgitation  Estimated peak PA  pressure was 45 mmHg  The findings suggest mild pulmonary hypertension  PULMONIC VALVE: Leaflets exhibited normal thickness, no calcification, and normal cuspal separation  DOPPLER: The transpulmonic velocity was within the normal range  There was mild regurgitation      PERICARDIUM: There was no pericardial effusion  The pericardium was normal in appearance  AORTA: The root exhibited normal size  SYSTEMIC VEINS: IVC: The inferior vena cava was normal in size  SYSTEM MEASUREMENT TABLES    2D  EF (Teich): 54 06 %  %FS: 28 21 %  Ao Diam: 3 01 cm  EDV(Teich): 137 46 ml  ESV(Teich): 63 15 ml  HR_2Ch_Q: 49 1 bpm  HR_4Ch_Q: 50 06 bpm  IVSd: 0 86 cm  LA Area: 25 89 cm2  LA Diam: 3 85 cm  LVCO_2Ch_Q: 2 18 L/min  LVCO_4Ch_Q: 2 13 L/min  LVCO_BiP_Q: 2 16 L/min  LVEF_2Ch_Q: 54 07 %  LVEF_4Ch_Q: 60 44 %  LVEF_BiP_Q: 57 04 %  LVIDd: 5 34 cm  LVIDs: 3 83 cm  LVLd_2Ch_Q: 6 06 cm  LVLd_4Ch_Q: 6 57 cm  LVLs_2Ch_Q: 5 34 cm  LVLs_4Ch_Q: 5 65 cm  LVOT Diam: 2 03 cm  LVPWd: 0 92 cm  LVSV_2Ch_Q: 44 5 ml  LVSV_4Ch_Q: 42 52 ml  LVSV_BiP_Q: 41 89 ml  LVVED_2Ch_Q: 82 3 ml  LVVED_4Ch_Q: 70 35 ml  LVVED_BiP_Q: 73 45 ml  LVVES_2Ch_Q: 37 8 ml  LVVES_4Ch_Q: 27 83 ml  LVVES_BiP_Q: 31 56 ml  RA Area: 26 88 cm2  RVIDd: 3 93 cm  SV (Teich): 74 31 ml    CW  AV Vmax: 1 02 m/s  AV maxP 17 mmHg  TR Vmax: 3 12 m/s  TR maxP 97 mmHg    MM  TAPSE: 2 94 cm    PW  MV E/A Ratio: 1 7  E' Sept: 0 08 m/s  E/E' Sept: 7 03  LVOT Vmax: 0 88 m/s  LVOT maxPG: 3 12 mmHg  MV A Albert: 0 33 m/s  MV Dec [place]: 3 27 m/s2  MV DecT: 172 39 ms  MV E Albert: 0 56 m/s    [facility]    Prepared and electronically signed by    Katharine Melvin MD  Signed [id number] 14:06:34       EKG: Personally reviewed  Counseling :  Counseling / Coordination of Care Time: 40min  Greater than 50% of total time spent on patient counseling and coordination of care  Code Status: Level 1 - Full Code  Collaboration of Care: Were Recommendations Directly Discussed with Primary Treatment Team? - Yes     Katharine Melvin MD [facility]    "This note has been constructed using a voice recognition system  Therefore there may be syntax, spelling, and/or grammatical errors   Please call if you have any questions  "

## 2021-05-09 NOTE — NURSING NOTE
Patient declined to use SCDs  Education was provided on risks vs benefits and states understanding  Refusal form signed

## 2021-05-10 ENCOUNTER — APPOINTMENT (INPATIENT)
Dept: RADIOLOGY | Facility: HOSPITAL | Age: 76
DRG: 307 | End: 2021-05-10
Payer: MEDICARE

## 2021-05-10 ENCOUNTER — APPOINTMENT (INPATIENT)
Dept: NON INVASIVE DIAGNOSTICS | Facility: HOSPITAL | Age: 76
DRG: 307 | End: 2021-05-10
Payer: MEDICARE

## 2021-05-10 VITALS
DIASTOLIC BLOOD PRESSURE: 69 MMHG | HEIGHT: 68 IN | TEMPERATURE: 98 F | WEIGHT: 160.05 LBS | BODY MASS INDEX: 24.26 KG/M2 | OXYGEN SATURATION: 97 % | SYSTOLIC BLOOD PRESSURE: 110 MMHG | HEART RATE: 66 BPM | RESPIRATION RATE: 18 BRPM

## 2021-05-10 PROBLEM — R07.9 CHEST PAIN: Status: RESOLVED | Noted: 2021-05-07 | Resolved: 2021-05-10

## 2021-05-10 LAB
ANION GAP SERPL CALCULATED.3IONS-SCNC: 6 MMOL/L (ref 4–13)
ATRIAL RATE: 0 BPM
ATRIAL RATE: 0 BPM
ATRIAL RATE: 57 BPM
ATRIAL RATE: 62 BPM
ATRIAL RATE: 63 BPM
ATRIAL RATE: 65 BPM
BUN SERPL-MCNC: 27 MG/DL (ref 5–25)
CALCIUM SERPL-MCNC: 8.2 MG/DL (ref 8.3–10.1)
CHEST PAIN STATEMENT: NORMAL
CHLORIDE SERPL-SCNC: 108 MMOL/L (ref 100–108)
CO2 SERPL-SCNC: 28 MMOL/L (ref 21–32)
CREAT SERPL-MCNC: 1.59 MG/DL (ref 0.6–1.3)
GFR SERPL CREATININE-BSD FRML MDRD: 31 ML/MIN/1.73SQ M
GLUCOSE SERPL-MCNC: 88 MG/DL (ref 65–140)
MAX DIASTOLIC BP: 56 MMHG
MAX HEART RATE: 102 BPM
MAX PREDICTED HEART RATE: 144 BPM
MAX. SYSTOLIC BP: 135 MMHG
P AXIS: 60 DEGREES
P AXIS: 60 DEGREES
P AXIS: 63 DEGREES
P AXIS: 68 DEGREES
P AXIS: 69 DEGREES
P AXIS: 74 DEGREES
P AXIS: 74 DEGREES
POTASSIUM SERPL-SCNC: 4.3 MMOL/L (ref 3.5–5.3)
PR INTERVAL: 142 MS
PR INTERVAL: 142 MS
PR INTERVAL: 152 MS
PR INTERVAL: 154 MS
PR INTERVAL: 156 MS
PR INTERVAL: 156 MS
PR INTERVAL: 158 MS
PROTOCOL NAME: NORMAL
QRS AXIS: 0 DEGREES
QRS AXIS: 0 DEGREES
QRS AXIS: 51 DEGREES
QRS AXIS: 54 DEGREES
QRS AXIS: 56 DEGREES
QRS AXIS: 56 DEGREES
QRS AXIS: 57 DEGREES
QRS AXIS: 59 DEGREES
QRS AXIS: 60 DEGREES
QRSD INTERVAL: 0 MS
QRSD INTERVAL: 0 MS
QRSD INTERVAL: 82 MS
QRSD INTERVAL: 84 MS
QRSD INTERVAL: 88 MS
QRSD INTERVAL: 88 MS
QRSD INTERVAL: 90 MS
QT INTERVAL: 0 MS
QT INTERVAL: 0 MS
QT INTERVAL: 440 MS
QT INTERVAL: 442 MS
QT INTERVAL: 456 MS
QT INTERVAL: 458 MS
QT INTERVAL: 466 MS
QTC INTERVAL: 0 MS
QTC INTERVAL: 0 MS
QTC INTERVAL: 452 MS
QTC INTERVAL: 453 MS
QTC INTERVAL: 457 MS
QTC INTERVAL: 464 MS
QTC INTERVAL: 466 MS
REASON FOR TERMINATION: NORMAL
SODIUM SERPL-SCNC: 142 MMOL/L (ref 136–145)
T WAVE AXIS: 0 DEGREES
T WAVE AXIS: 0 DEGREES
T WAVE AXIS: 112 DEGREES
T WAVE AXIS: 41 DEGREES
T WAVE AXIS: 72 DEGREES
T WAVE AXIS: 78 DEGREES
T WAVE AXIS: 78 DEGREES
T WAVE AXIS: 85 DEGREES
T WAVE AXIS: 93 DEGREES
TARGET HR FORMULA: NORMAL
TEST INDICATION: NORMAL
TIME IN EXERCISE PHASE: NORMAL
VENTRICULAR RATE: 0 BPM
VENTRICULAR RATE: 0 BPM
VENTRICULAR RATE: 57 BPM
VENTRICULAR RATE: 62 BPM
VENTRICULAR RATE: 63 BPM
VENTRICULAR RATE: 65 BPM

## 2021-05-10 PROCEDURE — 78452 HT MUSCLE IMAGE SPECT MULT: CPT

## 2021-05-10 PROCEDURE — 93017 CV STRESS TEST TRACING ONLY: CPT

## 2021-05-10 PROCEDURE — 78452 HT MUSCLE IMAGE SPECT MULT: CPT | Performed by: INTERNAL MEDICINE

## 2021-05-10 PROCEDURE — 80048 BASIC METABOLIC PNL TOTAL CA: CPT | Performed by: FAMILY MEDICINE

## 2021-05-10 PROCEDURE — 93018 CV STRESS TEST I&R ONLY: CPT | Performed by: INTERNAL MEDICINE

## 2021-05-10 PROCEDURE — 93016 CV STRESS TEST SUPVJ ONLY: CPT | Performed by: INTERNAL MEDICINE

## 2021-05-10 PROCEDURE — G1004 CDSM NDSC: HCPCS

## 2021-05-10 PROCEDURE — 99239 HOSP IP/OBS DSCHRG MGMT >30: CPT | Performed by: FAMILY MEDICINE

## 2021-05-10 PROCEDURE — 99231 SBSQ HOSP IP/OBS SF/LOW 25: CPT | Performed by: INTERNAL MEDICINE

## 2021-05-10 PROCEDURE — A9502 TC99M TETROFOSMIN: HCPCS

## 2021-05-10 PROCEDURE — 93010 ELECTROCARDIOGRAM REPORT: CPT | Performed by: INTERNAL MEDICINE

## 2021-05-10 RX ADMIN — HEPARIN SODIUM 5000 UNITS: 5000 INJECTION INTRAVENOUS; SUBCUTANEOUS at 15:00

## 2021-05-10 RX ADMIN — HEPARIN SODIUM 5000 UNITS: 5000 INJECTION INTRAVENOUS; SUBCUTANEOUS at 05:00

## 2021-05-10 RX ADMIN — ATORVASTATIN CALCIUM 10 MG: 10 TABLET, FILM COATED ORAL at 08:22

## 2021-05-10 RX ADMIN — METOPROLOL TARTRATE 25 MG: 50 TABLET, FILM COATED ORAL at 08:22

## 2021-05-10 RX ADMIN — REGADENOSON 0.4 MG: 0.08 INJECTION, SOLUTION INTRAVENOUS at 13:46

## 2021-05-10 RX ADMIN — METOPROLOL TARTRATE 25 MG: 50 TABLET, FILM COATED ORAL at 17:30

## 2021-05-10 RX ADMIN — ASPIRIN 81 MG CHEWABLE TABLET 162 MG: 81 TABLET CHEWABLE at 08:21

## 2021-05-10 NOTE — DISCHARGE SUMMARY
Discharge Summary - [facility] Internal Medicine    Patient Information: Franc Dee 68 y o  female MRN: [id number]  Unit/Bed#: [address] [place] [address] Encounter: [id number]    Discharging Physician / Practitioner: Vicky Cordova DO  PCP: Dorothea Diaz MD  Admission Date: 5/7/2021  Discharge Date: 05/10/21    Reason for Admission: Chest Pain (here with friend  neither one can tell much about what is going on  friend states pt has memory problem  had some pain in chest tonight which scared her, none now comes and goes)      Discharge Diagnoses:     Principal Problem (Resolved):    Chest pain  Active Problems:    Essential hypertension    Coronary artery disease involving native coronary artery of native heart without angina pectoris    Abdominal aortic aneurysm (AAA) without rupture (HCC)    Hyperlipidemia    Stage 3b chronic kidney disease ([facility] [address] )        * Chest pain-resolved as of 5/10/2021  Assessment & Plan  Patient presented to the ED with chest pain, no current chest pain but intermittent for the past few days  COSME score 3    - troponins negative  - CXR without acute abnormalities  - EKG with T wave inversions in lateral leads, similar to previous EKG, LVH  - Continue Lopressor 25 mg BID  - echo showed EF of 50-55% with moderately dilated left and right atrium, mild-to-moderate mitral valve regurgitation and moderate tricuspid valve regurgitation  - nuclear stress test was negative for ischemia per Cardiology  Follow up with own cardiologist after discharge      Stage 3b chronic kidney disease [facility])  Assessment & Plan  Lab Results   Component Value Date    EGFR 31 05/10/2021    EGFR 38 05/09/2021    EGFR 34 05/07/2021    CREATININE 1 59 (H) 05/10/2021    CREATININE 1 36 (H) 05/09/2021    CREATININE 1 47 (H) 05/07/2021     Cr stable at baseline   baseline ranges from 1 6-2 0     Hyperlipidemia  Assessment & Plan  Continue statin therapy  Lipid panel from February 2021 shows LDL of 68    Abdominal aortic aneurysm (AAA) without rupture [facility])  Assessment & Plan  Noted on CT abdomen in 11/2020  Infrarenal AAA measuring 4 1 cm  Continue outpatient f/u with cardiology and routine imaging    Coronary artery disease involving native coronary artery of native heart without angina pectoris  Assessment & Plan  Hx of CAD s/p PCI in 2012 with NARGIS to RCA and again in 2013 with NARGIS to LAD   Most recent nuclear stress test in 2/2019  Continue ASA, Lopressor, statin therapy  Essential hypertension  Assessment & Plan  Continue Lopressor 25 mg BID  Consultations During Hospital Stay:  [address] TO CASE MANAGEMENT    Procedures Performed:     · Stress test  · Echo    Significant Findings:     · Refer to hospital course and above listed diagnosis related plan for details    Imaging while in hospital:    Xr Chest 1 View Portable    Result Date: 5/8/2021  Narrative: CHEST INDICATION:   cp  COMPARISON:  CT chest 11/12/2020 EXAM PERFORMED/VIEWS:  XR CHEST PORTABLE FINDINGS: Cardiomediastinal silhouette appears unremarkable  The lungs are clear  No pneumothorax or pleural effusion  Osseous structures appear within normal limits for patient age  Impression: No acute cardiopulmonary disease  Workstation performed: [id number]       Incidental Findings:   · None    Test Results Pending at Discharge (will require follow up):   · As per After Visit Summary     Outpatient Tests Requested:  · None    Complications:  Refer to hospital course and above listed diagnosis related plan, if any    Hospital Course:     Gamal Gore is a 68 y o  female patient who originally presented to the hospital on 5/7/2021 due to Chest pain  Patient was seen by her cardiologist today prior had reported intermittent jaw pain  Plan was for nuclear stress test and echo as outpatient if her symptoms persisted  Patient was admitted and seen by Cardiology while here    She underwent stress test and cleared by Cardiology prior to discharge  Discharge plan discussed with patient  Called her daughter to discuss discharge plan with her but received voicemail  Please see above list of diagnoses and related plan for additional information  Condition at Discharge: stable     Discharge Day Visit / Exam:     Subjective:  Denies any chest pain, shortness of breath  Ready to go home as soon as possible    Vitals: Blood Pressure: 110/69 (05/10/21 1544)  Pulse: 66 (05/10/21 0754)  Temperature: 98 °F (36 7 °C) (05/10/21 0900)  Temp Source: Oral (05/09/21 1449)  Respirations: 18 (05/10/21 1544)  Height: 5' 8" (172 7 cm) (05/07/21 2253)  Weight - Scale: 72 6 kg (160 lb 0 9 oz) (05/07/21 2253)  SpO2: 97 % (05/10/21 0754)  Exam:   Physical Exam  Constitutional:       General: She is not in acute distress  Appearance: She is not diaphoretic  HENT:      Head: Normocephalic and atraumatic  Eyes:      General: No scleral icterus  Right eye: No discharge  Left eye: No discharge  Cardiovascular:      Rate and Rhythm: Normal rate and regular rhythm  Pulmonary:      Effort: Pulmonary effort is normal  No respiratory distress  Breath sounds: Normal breath sounds  No wheezing or rales  Abdominal:      General: Bowel sounds are normal  There is no distension  Palpations: Abdomen is soft  Tenderness: There is no abdominal tenderness  Neurological:      Mental Status: She is alert  Comments: Forgetful         Discharge instructions/Information to patient and family:(Discharge Medications and Follow up):   See after visit summary for information provided to patient and family  Provisions for Follow-Up Care:  See after visit summary for information related to follow-up care and any pertinent home health orders  Disposition: Home    Planned Readmission:  No     Discharge Statement:  I spent 35 minutes discharging the patient  This time was spent on the day of discharge   I had direct contact with the patient on the day of discharge  Greater than 50% of the total time was spent examining patient, answering all patient questions, arranging and discussing plan of care with patient as well as directly providing post-discharge instructions  Additional time then spent on discharge activities  Discharge Medications:  See after visit summary for reconciled discharge medications provided to patient and family  ** Please Note: "This note has been constructed using a voice recognition system  Therefore there may be syntax, spelling, and/or grammatical errors   Please call if you have any questions  "**

## 2021-05-10 NOTE — PROGRESS NOTES
Daily Cardiology Progress Note              LOS: 2 days     Assessment/Plan     Principal Problem:    Chest pain  Active Problems:    Essential hypertension    Coronary artery disease involving native coronary artery of native heart without angina pectoris    Abdominal aortic aneurysm (AAA) without rupture (HCC)    Hyperlipidemia    Stage 3b chronic kidney disease ([facility] [address] )    1  Atypical chest pain - Continue ASA  2  Palpitations - PACs on telemetry  On metoprolol  3  Mild to moderate MR and TR  4  CAD with prior PCI - Continue ASA and statin  LDL is at goal         - Patient to undergo stress test today  - Continue ASA and metoprolol      Subjective     Interval History: No further chest pain     Objective     Vital signs in last 24 hours:  Temp:  [97 1 °F (36 2 °C)-98 °F (36 7 °C)] 98 °F (36 7 °C)  HR:  [58-66] 66  Resp:  [18-20] 18  BP: (111-139)/(58-79) 120/79    Intake/Output last 3 shifts:  I/O last 3 completed shifts: In: 180 [P O :180]  Out: 400 [Urine:400]  Intake/Output this shift:  I/O this shift:  In: 350 [P O :350]  Out: 400 [Urine:400]    Physical Exam:  Physical Exam   Constitutional: She appears healthy  No distress  Eyes: Pupils are equal, round, and reactive to light  Conjunctivae are normal    Neck: Normal range of motion  Neck supple  No JVD present  Cardiovascular: Normal rate, regular rhythm and normal heart sounds  Exam reveals no gallop and no friction rub  No murmur heard  Pulmonary/Chest: Effort normal and breath sounds normal  She has no wheezes  She has no rales  Musculoskeletal:         General: No tenderness, deformity or edema  Neurological: She is alert and oriented to person, place, and time  Skin: Skin is warm and dry  Lab Results: I have personally reviewed pertinent lab results  Imaging: I have personally reviewed pertinent films in PACS Normal chest Xray  EKG/Tele: Reviewed   No events

## 2021-05-10 NOTE — NURSING NOTE
The patient discharged home with daughter  AVS and the discharge instructions provided to the patient and the daughter  They verbalized well understandings  Brenna Esteves

## 2021-10-12 ENCOUNTER — HOSPITAL ENCOUNTER (OUTPATIENT)
Facility: HOSPITAL | Age: 76
Setting detail: OBSERVATION
Discharge: HOME | End: 2021-10-13
Attending: EMERGENCY MEDICINE | Admitting: FAMILY MEDICINE
Payer: MEDICARE

## 2021-10-12 ENCOUNTER — APPOINTMENT (EMERGENCY)
Dept: RADIOLOGY | Facility: HOSPITAL | Age: 76
End: 2021-10-12
Payer: MEDICARE

## 2021-10-12 DIAGNOSIS — R07.9 CHEST PAIN: Primary | ICD-10-CM

## 2021-10-12 LAB
ALBUMIN SERPL BCP-MCNC: 3.1 G/DL (ref 3.5–5)
ALP SERPL-CCNC: 93 U/L (ref 46–116)
ALT SERPL W P-5'-P-CCNC: 20 U/L (ref 12–78)
ANION GAP SERPL CALCULATED.3IONS-SCNC: 9 MMOL/L (ref 4–13)
APTT PPP: 29 SECONDS (ref 23–37)
AST SERPL W P-5'-P-CCNC: 22 U/L (ref 5–45)
BACTERIA UR QL AUTO: NORMAL /HPF
BASOPHILS # BLD AUTO: 0.04 THOUSANDS/ΜL (ref 0–0.1)
BASOPHILS NFR BLD AUTO: 1 % (ref 0–1)
BILIRUB SERPL-MCNC: 0.31 MG/DL (ref 0.2–1)
BILIRUB UR QL STRIP: NEGATIVE
BUN SERPL-MCNC: 26 MG/DL (ref 5–25)
CALCIUM ALBUM COR SERPL-MCNC: 9.1 MG/DL (ref 8.3–10.1)
CALCIUM SERPL-MCNC: 8.4 MG/DL (ref 8.3–10.1)
CHLORIDE SERPL-SCNC: 105 MMOL/L (ref 100–108)
CLARITY UR: CLEAR
CO2 SERPL-SCNC: 27 MMOL/L (ref 21–32)
COLOR UR: ABNORMAL
CREAT SERPL-MCNC: 1.49 MG/DL (ref 0.6–1.3)
EOSINOPHIL # BLD AUTO: 0.15 THOUSAND/ΜL (ref 0–0.61)
EOSINOPHIL NFR BLD AUTO: 3 % (ref 0–6)
ERYTHROCYTE [DISTWIDTH] IN BLOOD BY AUTOMATED COUNT: 13.6 % (ref 11.6–15.1)
GFR SERPL CREATININE-BSD FRML MDRD: 34 ML/MIN/1.73SQ M
GLUCOSE SERPL-MCNC: 108 MG/DL (ref 65–140)
GLUCOSE UR STRIP-MCNC: NEGATIVE MG/DL
HCT VFR BLD AUTO: 39.6 % (ref 34.8–46.1)
HGB BLD-MCNC: 12.6 G/DL (ref 11.5–15.4)
HGB UR QL STRIP.AUTO: ABNORMAL
IMM GRANULOCYTES # BLD AUTO: 0.02 THOUSAND/UL (ref 0–0.2)
IMM GRANULOCYTES NFR BLD AUTO: 0 % (ref 0–2)
INR PPP: 1.04 (ref 0.84–1.19)
KETONES UR STRIP-MCNC: NEGATIVE MG/DL
LEUKOCYTE ESTERASE UR QL STRIP: NEGATIVE
LYMPHOCYTES # BLD AUTO: 1.29 THOUSANDS/ΜL (ref 0.6–4.47)
LYMPHOCYTES NFR BLD AUTO: 23 % (ref 14–44)
MAGNESIUM SERPL-MCNC: 2.4 MG/DL (ref 1.6–2.6)
MCH RBC QN AUTO: 31.3 PG (ref 26.8–34.3)
MCHC RBC AUTO-ENTMCNC: 31.8 G/DL (ref 31.4–37.4)
MCV RBC AUTO: 98 FL (ref 82–98)
MONOCYTES # BLD AUTO: 0.61 THOUSAND/ΜL (ref 0.17–1.22)
MONOCYTES NFR BLD AUTO: 11 % (ref 4–12)
NEUTROPHILS # BLD AUTO: 3.56 THOUSANDS/ΜL (ref 1.85–7.62)
NEUTS SEG NFR BLD AUTO: 62 % (ref 43–75)
NITRITE UR QL STRIP: NEGATIVE
NON-SQ EPI CELLS URNS QL MICRO: NORMAL /HPF
NRBC BLD AUTO-RTO: 0 /100 WBCS
NT-PROBNP SERPL-MCNC: 3809 PG/ML
PH UR STRIP.AUTO: 6.5 [PH]
PLATELET # BLD AUTO: 150 THOUSANDS/UL (ref 149–390)
PMV BLD AUTO: 10.5 FL (ref 8.9–12.7)
POTASSIUM SERPL-SCNC: 4.7 MMOL/L (ref 3.5–5.3)
PROT SERPL-MCNC: 6.9 G/DL (ref 6.4–8.2)
PROT UR STRIP-MCNC: NEGATIVE MG/DL
PROTHROMBIN TIME: 13.4 SECONDS (ref 11.6–14.5)
RBC # BLD AUTO: 4.03 MILLION/UL (ref 3.81–5.12)
RBC #/AREA URNS AUTO: NORMAL /HPF
SODIUM SERPL-SCNC: 141 MMOL/L (ref 136–145)
SP GR UR STRIP.AUTO: 1.01 (ref 1–1.03)
TROPONIN I SERPL-MCNC: <0.02 NG/ML
UROBILINOGEN UR QL STRIP.AUTO: 0.2 E.U./DL
WBC # BLD AUTO: 5.67 THOUSAND/UL (ref 4.31–10.16)
WBC #/AREA URNS AUTO: NORMAL /HPF

## 2021-10-12 PROCEDURE — 96360 HYDRATION IV INFUSION INIT: CPT

## 2021-10-12 PROCEDURE — 85025 COMPLETE CBC W/AUTO DIFF WBC: CPT | Performed by: EMERGENCY MEDICINE

## 2021-10-12 PROCEDURE — 71045 X-RAY EXAM CHEST 1 VIEW: CPT

## 2021-10-12 PROCEDURE — 99285 EMERGENCY DEPT VISIT HI MDM: CPT | Performed by: EMERGENCY MEDICINE

## 2021-10-12 PROCEDURE — 83735 ASSAY OF MAGNESIUM: CPT | Performed by: EMERGENCY MEDICINE

## 2021-10-12 PROCEDURE — 93005 ELECTROCARDIOGRAM TRACING: CPT

## 2021-10-12 PROCEDURE — 99285 EMERGENCY DEPT VISIT HI MDM: CPT

## 2021-10-12 PROCEDURE — 81001 URINALYSIS AUTO W/SCOPE: CPT | Performed by: EMERGENCY MEDICINE

## 2021-10-12 PROCEDURE — 99220 PR INITIAL OBSERVATION CARE/DAY 70 MINUTES: CPT | Performed by: FAMILY MEDICINE

## 2021-10-12 PROCEDURE — 83880 ASSAY OF NATRIURETIC PEPTIDE: CPT | Performed by: EMERGENCY MEDICINE

## 2021-10-12 PROCEDURE — 80053 COMPREHEN METABOLIC PANEL: CPT | Performed by: EMERGENCY MEDICINE

## 2021-10-12 PROCEDURE — 85610 PROTHROMBIN TIME: CPT | Performed by: EMERGENCY MEDICINE

## 2021-10-12 PROCEDURE — 84484 ASSAY OF TROPONIN QUANT: CPT | Performed by: EMERGENCY MEDICINE

## 2021-10-12 PROCEDURE — 96361 HYDRATE IV INFUSION ADD-ON: CPT

## 2021-10-12 PROCEDURE — 36415 COLL VENOUS BLD VENIPUNCTURE: CPT | Performed by: EMERGENCY MEDICINE

## 2021-10-12 PROCEDURE — 85730 THROMBOPLASTIN TIME PARTIAL: CPT | Performed by: EMERGENCY MEDICINE

## 2021-10-12 RX ORDER — ATORVASTATIN CALCIUM 40 MG/1
40 TABLET, FILM COATED ORAL DAILY
Status: DISCONTINUED | OUTPATIENT
Start: 2021-10-13 | End: 2021-10-13 | Stop reason: HOSPADM

## 2021-10-12 RX ORDER — ASPIRIN 81 MG/1
324 TABLET, CHEWABLE ORAL ONCE
Status: COMPLETED | OUTPATIENT
Start: 2021-10-12 | End: 2021-10-12

## 2021-10-12 RX ORDER — SODIUM CHLORIDE 9 MG/ML
125 INJECTION, SOLUTION INTRAVENOUS CONTINUOUS
Status: DISCONTINUED | OUTPATIENT
Start: 2021-10-12 | End: 2021-10-12

## 2021-10-12 RX ORDER — HEPARIN SODIUM 5000 [USP'U]/ML
5000 INJECTION, SOLUTION INTRAVENOUS; SUBCUTANEOUS EVERY 8 HOURS SCHEDULED
Status: DISCONTINUED | OUTPATIENT
Start: 2021-10-13 | End: 2021-10-13 | Stop reason: HOSPADM

## 2021-10-12 RX ORDER — ASPIRIN 81 MG/1
162 TABLET, CHEWABLE ORAL DAILY
Status: DISCONTINUED | OUTPATIENT
Start: 2021-10-13 | End: 2021-10-13 | Stop reason: HOSPADM

## 2021-10-12 RX ORDER — ATORVASTATIN CALCIUM 10 MG/1
10 TABLET, FILM COATED ORAL DAILY
Status: DISCONTINUED | OUTPATIENT
Start: 2021-10-13 | End: 2021-10-12

## 2021-10-12 RX ADMIN — SODIUM CHLORIDE 125 ML/HR: 0.9 INJECTION, SOLUTION INTRAVENOUS at 19:10

## 2021-10-12 RX ADMIN — ASPIRIN 81 MG CHEWABLE TABLET 324 MG: 81 TABLET CHEWABLE at 19:06

## 2021-10-13 VITALS
RESPIRATION RATE: 22 BRPM | HEART RATE: 52 BPM | DIASTOLIC BLOOD PRESSURE: 65 MMHG | WEIGHT: 169.97 LBS | BODY MASS INDEX: 26.68 KG/M2 | HEIGHT: 67 IN | TEMPERATURE: 98.3 F | OXYGEN SATURATION: 96 % | SYSTOLIC BLOOD PRESSURE: 135 MMHG

## 2021-10-13 LAB
ANION GAP SERPL CALCULATED.3IONS-SCNC: 5 MMOL/L (ref 4–13)
BUN SERPL-MCNC: 20 MG/DL (ref 5–25)
CALCIUM SERPL-MCNC: 8.2 MG/DL (ref 8.3–10.1)
CHLORIDE SERPL-SCNC: 110 MMOL/L (ref 100–108)
CHOLEST SERPL-MCNC: 117 MG/DL (ref 50–200)
CO2 SERPL-SCNC: 27 MMOL/L (ref 21–32)
CREAT SERPL-MCNC: 1.35 MG/DL (ref 0.6–1.3)
GFR SERPL CREATININE-BSD FRML MDRD: 38 ML/MIN/1.73SQ M
GLUCOSE SERPL-MCNC: 82 MG/DL (ref 65–140)
HDLC SERPL-MCNC: 49 MG/DL
LDLC SERPL CALC-MCNC: 53 MG/DL (ref 0–100)
NONHDLC SERPL-MCNC: 68 MG/DL
PLATELET # BLD AUTO: 137 THOUSANDS/UL (ref 149–390)
PMV BLD AUTO: 10.5 FL (ref 8.9–12.7)
POTASSIUM SERPL-SCNC: 4.1 MMOL/L (ref 3.5–5.3)
SODIUM SERPL-SCNC: 142 MMOL/L (ref 136–145)
TRIGL SERPL-MCNC: 76 MG/DL
TROPONIN I SERPL-MCNC: <0.02 NG/ML
TROPONIN I SERPL-MCNC: <0.02 NG/ML

## 2021-10-13 PROCEDURE — 80048 BASIC METABOLIC PNL TOTAL CA: CPT | Performed by: FAMILY MEDICINE

## 2021-10-13 PROCEDURE — 80061 LIPID PANEL: CPT | Performed by: FAMILY MEDICINE

## 2021-10-13 PROCEDURE — 84484 ASSAY OF TROPONIN QUANT: CPT | Performed by: FAMILY MEDICINE

## 2021-10-13 PROCEDURE — 85049 AUTOMATED PLATELET COUNT: CPT | Performed by: FAMILY MEDICINE

## 2021-10-13 PROCEDURE — 99215 OFFICE O/P EST HI 40 MIN: CPT | Performed by: INTERNAL MEDICINE

## 2021-10-13 PROCEDURE — 99217 PR OBSERVATION CARE DISCHARGE MANAGEMENT: CPT | Performed by: INTERNAL MEDICINE

## 2021-10-13 RX ADMIN — ASPIRIN 81 MG CHEWABLE TABLET 162 MG: 81 TABLET CHEWABLE at 09:47

## 2021-10-13 RX ADMIN — METOPROLOL TARTRATE 25 MG: 25 TABLET, FILM COATED ORAL at 09:47

## 2021-10-13 RX ADMIN — HEPARIN SODIUM 5000 UNITS: 5000 INJECTION INTRAVENOUS; SUBCUTANEOUS at 05:31

## 2021-10-14 LAB
ATRIAL RATE: 63 BPM
ATRIAL RATE: 66 BPM
P AXIS: 58 DEGREES
P AXIS: 74 DEGREES
PR INTERVAL: 148 MS
PR INTERVAL: 172 MS
QRS AXIS: 54 DEGREES
QRS AXIS: 67 DEGREES
QRSD INTERVAL: 84 MS
QRSD INTERVAL: 86 MS
QT INTERVAL: 424 MS
QT INTERVAL: 444 MS
QTC INTERVAL: 433 MS
QTC INTERVAL: 465 MS
T WAVE AXIS: 102 DEGREES
T WAVE AXIS: 98 DEGREES
VENTRICULAR RATE: 63 BPM
VENTRICULAR RATE: 66 BPM

## 2021-10-14 PROCEDURE — 93010 ELECTROCARDIOGRAM REPORT: CPT | Performed by: INTERNAL MEDICINE

## 2021-12-04 ENCOUNTER — HOSPITAL ENCOUNTER (EMERGENCY)
Facility: HOSPITAL | Age: 76
Discharge: HOME | End: 2021-12-04
Attending: EMERGENCY MEDICINE
Payer: MEDICARE

## 2021-12-04 ENCOUNTER — APPOINTMENT (EMERGENCY)
Dept: RADIOLOGY | Facility: HOSPITAL | Age: 76
End: 2021-12-04
Payer: MEDICARE

## 2021-12-04 VITALS
HEIGHT: 67 IN | WEIGHT: 190 LBS | BODY MASS INDEX: 29.82 KG/M2 | OXYGEN SATURATION: 98 % | TEMPERATURE: 96.9 F | SYSTOLIC BLOOD PRESSURE: 168 MMHG | DIASTOLIC BLOOD PRESSURE: 77 MMHG | RESPIRATION RATE: 22 BRPM | HEART RATE: 70 BPM

## 2021-12-04 DIAGNOSIS — I50.9 CHF (CONGESTIVE HEART FAILURE) (HCC): ICD-10-CM

## 2021-12-04 DIAGNOSIS — R60.9 PERIPHERAL EDEMA: Primary | ICD-10-CM

## 2021-12-04 LAB
2HR DELTA HS TROPONIN: 2 NG/L
ALBUMIN SERPL BCP-MCNC: 3.2 G/DL (ref 3.5–5)
ALP SERPL-CCNC: 97 U/L (ref 46–116)
ALT SERPL W P-5'-P-CCNC: 34 U/L (ref 12–78)
ANION GAP SERPL CALCULATED.3IONS-SCNC: 7 MMOL/L (ref 4–13)
AST SERPL W P-5'-P-CCNC: 24 U/L (ref 5–45)
BASOPHILS # BLD AUTO: 0.04 THOUSANDS/ΜL (ref 0–0.1)
BASOPHILS NFR BLD AUTO: 1 % (ref 0–1)
BILIRUB SERPL-MCNC: 0.35 MG/DL (ref 0.2–1)
BUN SERPL-MCNC: 19 MG/DL (ref 5–25)
CALCIUM ALBUM COR SERPL-MCNC: 9.4 MG/DL (ref 8.3–10.1)
CALCIUM SERPL-MCNC: 8.8 MG/DL (ref 8.3–10.1)
CARDIAC TROPONIN I PNL SERPL HS: 15 NG/L
CARDIAC TROPONIN I PNL SERPL HS: 17 NG/L
CHLORIDE SERPL-SCNC: 107 MMOL/L (ref 100–108)
CO2 SERPL-SCNC: 29 MMOL/L (ref 21–32)
CREAT SERPL-MCNC: 1.52 MG/DL (ref 0.6–1.3)
EOSINOPHIL # BLD AUTO: 0.16 THOUSAND/ΜL (ref 0–0.61)
EOSINOPHIL NFR BLD AUTO: 3 % (ref 0–6)
ERYTHROCYTE [DISTWIDTH] IN BLOOD BY AUTOMATED COUNT: 14.6 % (ref 11.6–15.1)
GFR SERPL CREATININE-BSD FRML MDRD: 33 ML/MIN/1.73SQ M
GLUCOSE SERPL-MCNC: 97 MG/DL (ref 65–140)
HCT VFR BLD AUTO: 40.8 % (ref 34.8–46.1)
HGB BLD-MCNC: 12.7 G/DL (ref 11.5–15.4)
IMM GRANULOCYTES # BLD AUTO: 0.02 THOUSAND/UL (ref 0–0.2)
IMM GRANULOCYTES NFR BLD AUTO: 0 % (ref 0–2)
LYMPHOCYTES # BLD AUTO: 1.19 THOUSANDS/ΜL (ref 0.6–4.47)
LYMPHOCYTES NFR BLD AUTO: 20 % (ref 14–44)
MCH RBC QN AUTO: 30.6 PG (ref 26.8–34.3)
MCHC RBC AUTO-ENTMCNC: 31.1 G/DL (ref 31.4–37.4)
MCV RBC AUTO: 98 FL (ref 82–98)
MONOCYTES # BLD AUTO: 0.6 THOUSAND/ΜL (ref 0.17–1.22)
MONOCYTES NFR BLD AUTO: 10 % (ref 4–12)
NEUTROPHILS # BLD AUTO: 3.93 THOUSANDS/ΜL (ref 1.85–7.62)
NEUTS SEG NFR BLD AUTO: 66 % (ref 43–75)
NRBC BLD AUTO-RTO: 0 /100 WBCS
NT-PROBNP SERPL-MCNC: 5762 PG/ML
PLATELET # BLD AUTO: 188 THOUSANDS/UL (ref 149–390)
PMV BLD AUTO: 10.3 FL (ref 8.9–12.7)
POTASSIUM SERPL-SCNC: 4.4 MMOL/L (ref 3.5–5.3)
PROT SERPL-MCNC: 7 G/DL (ref 6.4–8.2)
RBC # BLD AUTO: 4.15 MILLION/UL (ref 3.81–5.12)
SODIUM SERPL-SCNC: 143 MMOL/L (ref 136–145)
WBC # BLD AUTO: 5.94 THOUSAND/UL (ref 4.31–10.16)

## 2021-12-04 PROCEDURE — 80053 COMPREHEN METABOLIC PANEL: CPT | Performed by: EMERGENCY MEDICINE

## 2021-12-04 PROCEDURE — 99284 EMERGENCY DEPT VISIT MOD MDM: CPT

## 2021-12-04 PROCEDURE — 36415 COLL VENOUS BLD VENIPUNCTURE: CPT | Performed by: EMERGENCY MEDICINE

## 2021-12-04 PROCEDURE — 99285 EMERGENCY DEPT VISIT HI MDM: CPT | Performed by: EMERGENCY MEDICINE

## 2021-12-04 PROCEDURE — 93005 ELECTROCARDIOGRAM TRACING: CPT

## 2021-12-04 PROCEDURE — 85025 COMPLETE CBC W/AUTO DIFF WBC: CPT | Performed by: EMERGENCY MEDICINE

## 2021-12-04 PROCEDURE — 83880 ASSAY OF NATRIURETIC PEPTIDE: CPT | Performed by: EMERGENCY MEDICINE

## 2021-12-04 PROCEDURE — 84484 ASSAY OF TROPONIN QUANT: CPT | Performed by: EMERGENCY MEDICINE

## 2021-12-04 PROCEDURE — 71045 X-RAY EXAM CHEST 1 VIEW: CPT

## 2021-12-04 RX ORDER — FEXOFENADINE HCL 180 MG/1
180 TABLET ORAL DAILY
COMMUNITY

## 2021-12-04 RX ORDER — DONEPEZIL HYDROCHLORIDE 5 MG/1
5 TABLET, FILM COATED ORAL
COMMUNITY

## 2021-12-04 RX ORDER — FUROSEMIDE 10 MG/ML
40 INJECTION INTRAMUSCULAR; INTRAVENOUS ONCE
Status: COMPLETED | OUTPATIENT
Start: 2021-12-04 | End: 2021-12-04

## 2021-12-04 RX ADMIN — FUROSEMIDE 40 MG: 10 INJECTION, SOLUTION INTRAMUSCULAR; INTRAVENOUS at 19:56

## 2021-12-06 LAB
ATRIAL RATE: 67 BPM
P AXIS: 68 DEGREES
PR INTERVAL: 158 MS
QRS AXIS: 58 DEGREES
QRSD INTERVAL: 80 MS
QT INTERVAL: 434 MS
QTC INTERVAL: 458 MS
T WAVE AXIS: 123 DEGREES
VENTRICULAR RATE: 67 BPM

## 2021-12-06 PROCEDURE — 93010 ELECTROCARDIOGRAM REPORT: CPT | Performed by: INTERNAL MEDICINE

## 2022-02-26 ENCOUNTER — APPOINTMENT (OUTPATIENT)
Dept: LAB | Facility: HOSPITAL | Age: 77
End: 2022-02-26
Attending: INTERNAL MEDICINE
Payer: MEDICARE

## 2022-02-26 DIAGNOSIS — I10 ESSENTIAL HYPERTENSION, MALIGNANT: ICD-10-CM

## 2022-02-26 DIAGNOSIS — G31.84 MCI (MILD COGNITIVE IMPAIRMENT) WITH MEMORY LOSS: ICD-10-CM

## 2022-02-26 DIAGNOSIS — I65.21 OCCLUSION OF RIGHT CAROTID ARTERY: ICD-10-CM

## 2022-02-26 DIAGNOSIS — I50.20 SYSTOLIC HEART FAILURE, UNSPECIFIED HF CHRONICITY (HCC): ICD-10-CM

## 2022-02-26 LAB
25(OH)D3 SERPL-MCNC: 16.2 NG/ML (ref 30–100)
ALBUMIN SERPL BCP-MCNC: 3.4 G/DL (ref 3.5–5)
ALP SERPL-CCNC: 103 U/L (ref 46–116)
ALT SERPL W P-5'-P-CCNC: 33 U/L (ref 12–78)
ANION GAP SERPL CALCULATED.3IONS-SCNC: 8 MMOL/L (ref 4–13)
AST SERPL W P-5'-P-CCNC: 33 U/L (ref 5–45)
BASOPHILS # BLD AUTO: 0.05 THOUSANDS/ΜL (ref 0–0.1)
BASOPHILS NFR BLD AUTO: 1 % (ref 0–1)
BILIRUB SERPL-MCNC: 0.82 MG/DL (ref 0.2–1)
BUN SERPL-MCNC: 31 MG/DL (ref 5–25)
CALCIUM ALBUM COR SERPL-MCNC: 9.3 MG/DL (ref 8.3–10.1)
CALCIUM SERPL-MCNC: 8.8 MG/DL (ref 8.3–10.1)
CHLORIDE SERPL-SCNC: 103 MMOL/L (ref 100–108)
CHOLEST SERPL-MCNC: 151 MG/DL
CO2 SERPL-SCNC: 30 MMOL/L (ref 21–32)
CREAT SERPL-MCNC: 2.01 MG/DL (ref 0.6–1.3)
D DIMER PPP FEU-MCNC: 1.82 UG/ML FEU
EOSINOPHIL # BLD AUTO: 0.11 THOUSAND/ΜL (ref 0–0.61)
EOSINOPHIL NFR BLD AUTO: 2 % (ref 0–6)
ERYTHROCYTE [DISTWIDTH] IN BLOOD BY AUTOMATED COUNT: 15 % (ref 11.6–15.1)
ERYTHROCYTE [SEDIMENTATION RATE] IN BLOOD: 10 MM/HOUR (ref 0–29)
GFR SERPL CREATININE-BSD FRML MDRD: 23 ML/MIN/1.73SQ M
GLUCOSE P FAST SERPL-MCNC: 95 MG/DL (ref 65–99)
HCT VFR BLD AUTO: 41.6 % (ref 34.8–46.1)
HDLC SERPL-MCNC: 50 MG/DL
HGB BLD-MCNC: 13.3 G/DL (ref 11.5–15.4)
IMM GRANULOCYTES # BLD AUTO: 0.02 THOUSAND/UL (ref 0–0.2)
IMM GRANULOCYTES NFR BLD AUTO: 0 % (ref 0–2)
IRON SERPL-MCNC: 70 UG/DL (ref 50–170)
LDLC SERPL CALC-MCNC: 80 MG/DL (ref 0–100)
LYMPHOCYTES # BLD AUTO: 0.77 THOUSANDS/ΜL (ref 0.6–4.47)
LYMPHOCYTES NFR BLD AUTO: 15 % (ref 14–44)
MCH RBC QN AUTO: 30.6 PG (ref 26.8–34.3)
MCHC RBC AUTO-ENTMCNC: 32 G/DL (ref 31.4–37.4)
MCV RBC AUTO: 96 FL (ref 82–98)
MONOCYTES # BLD AUTO: 0.46 THOUSAND/ΜL (ref 0.17–1.22)
MONOCYTES NFR BLD AUTO: 9 % (ref 4–12)
NEUTROPHILS # BLD AUTO: 3.62 THOUSANDS/ΜL (ref 1.85–7.62)
NEUTS SEG NFR BLD AUTO: 73 % (ref 43–75)
NONHDLC SERPL-MCNC: 101 MG/DL
NRBC BLD AUTO-RTO: 0 /100 WBCS
NT-PROBNP SERPL-MCNC: 5929 PG/ML
PLATELET # BLD AUTO: 191 THOUSANDS/UL (ref 149–390)
PMV BLD AUTO: 10.7 FL (ref 8.9–12.7)
POTASSIUM SERPL-SCNC: 4.2 MMOL/L (ref 3.5–5.3)
PROT SERPL-MCNC: 7.6 G/DL (ref 6.4–8.2)
RBC # BLD AUTO: 4.34 MILLION/UL (ref 3.81–5.12)
SODIUM SERPL-SCNC: 141 MMOL/L (ref 136–145)
TRIGL SERPL-MCNC: 103 MG/DL
TSH SERPL DL<=0.05 MIU/L-ACNC: 2.32 UIU/ML (ref 0.36–3.74)
URATE SERPL-MCNC: 9.4 MG/DL (ref 2–6.8)
VIT B12 SERPL-MCNC: 418 PG/ML (ref 100–900)
WBC # BLD AUTO: 5.03 THOUSAND/UL (ref 4.31–10.16)

## 2022-02-26 PROCEDURE — 83880 ASSAY OF NATRIURETIC PEPTIDE: CPT

## 2022-02-26 PROCEDURE — 84550 ASSAY OF BLOOD/URIC ACID: CPT

## 2022-02-26 PROCEDURE — 80053 COMPREHEN METABOLIC PANEL: CPT

## 2022-02-26 PROCEDURE — 80061 LIPID PANEL: CPT

## 2022-02-26 PROCEDURE — 85025 COMPLETE CBC W/AUTO DIFF WBC: CPT

## 2022-02-26 PROCEDURE — 36415 COLL VENOUS BLD VENIPUNCTURE: CPT

## 2022-02-26 PROCEDURE — 87086 URINE CULTURE/COLONY COUNT: CPT

## 2022-02-26 PROCEDURE — 83540 ASSAY OF IRON: CPT

## 2022-02-26 PROCEDURE — 85379 FIBRIN DEGRADATION QUANT: CPT

## 2022-02-26 PROCEDURE — 85652 RBC SED RATE AUTOMATED: CPT

## 2022-02-26 PROCEDURE — 84443 ASSAY THYROID STIM HORMONE: CPT

## 2022-02-26 PROCEDURE — 82607 VITAMIN B-12: CPT

## 2022-02-26 PROCEDURE — 82306 VITAMIN D 25 HYDROXY: CPT

## 2022-02-27 LAB — BACTERIA UR CULT: NORMAL

## 2022-03-11 ENCOUNTER — HOSPITAL ENCOUNTER (OUTPATIENT)
Dept: RADIOLOGY | Facility: HOSPITAL | Age: 77
Discharge: HOME | End: 2022-03-11
Attending: INTERNAL MEDICINE
Payer: MEDICARE

## 2022-03-11 ENCOUNTER — HOSPITAL ENCOUNTER (OUTPATIENT)
Dept: NON INVASIVE DIAGNOSTICS | Facility: HOSPITAL | Age: 77
Discharge: HOME | End: 2022-03-11
Payer: MEDICARE

## 2022-03-11 VITALS
WEIGHT: 190 LBS | HEIGHT: 67 IN | BODY MASS INDEX: 29.82 KG/M2 | SYSTOLIC BLOOD PRESSURE: 143 MMHG | HEART RATE: 61 BPM | DIASTOLIC BLOOD PRESSURE: 74 MMHG

## 2022-03-11 DIAGNOSIS — G31.84 MCI (MILD COGNITIVE IMPAIRMENT) WITH MEMORY LOSS: ICD-10-CM

## 2022-03-11 DIAGNOSIS — I65.23 OCCLUSION AND STENOSIS OF BILATERAL CAROTID ARTERIES: ICD-10-CM

## 2022-03-11 DIAGNOSIS — I50.9 HEART FAILURE, UNSPECIFIED (HCC): ICD-10-CM

## 2022-03-11 DIAGNOSIS — I50.20 SYSTOLIC HEART FAILURE, UNSPECIFIED HF CHRONICITY (HCC): ICD-10-CM

## 2022-03-11 DIAGNOSIS — G31.84 MILD COGNITIVE IMPAIRMENT, SO STATED: ICD-10-CM

## 2022-03-11 DIAGNOSIS — I10 ESSENTIAL HYPERTENSION, MALIGNANT: ICD-10-CM

## 2022-03-11 DIAGNOSIS — I65.21 OCCLUSION OF RIGHT CAROTID ARTERY: ICD-10-CM

## 2022-03-11 LAB
AORTIC ROOT: 3.1 CM
APICAL FOUR CHAMBER EJECTION FRACTION: 42 %
AV LVOT PEAK GRADIENT: 3 MMHG
AV PEAK GRADIENT: 5 MMHG
DOP CALC LVOT AREA: 3.14 CM2
DOP CALC LVOT DIAMETER: 2 CM
E WAVE DECELERATION TIME: 140 MS
FRACTIONAL SHORTENING: 34 % (ref 28–44)
INTERVENTRICULAR SEPTUM IN DIASTOLE (PARASTERNAL SHORT AXIS VIEW): 1 CM
INTERVENTRICULAR SEPTUM: 1 CM (ref 0.54–1.01)
LAAS-AP2: 25.9 CM2
LAAS-AP4: 34.1 CM2
LEFT ATRIUM AREA SYSTOLE SINGLE PLANE A4C: 29.6 CM2
LEFT ATRIUM SIZE: 4.5 CM
LEFT INTERNAL DIMENSION IN SYSTOLE: 3.9 CM (ref 3.03–4.59)
LEFT VENTRICULAR INTERNAL DIMENSION IN DIASTOLE: 5.9 CM (ref 4.98–7.42)
LEFT VENTRICULAR POSTERIOR WALL IN END DIASTOLE: 0.9 CM (ref 0.52–0.99)
LEFT VENTRICULAR STROKE VOLUME: 103 ML
LVSV (TEICH): 103 ML
MV E'TISSUE VEL-LAT: 4 CM/S
MV E'TISSUE VEL-SEP: 6 CM/S
MV PEAK A VEL: 0.56 M/S
MV PEAK E VEL: 79 CM/S
MV STENOSIS PRESSURE HALF TIME: 41 MS
MV VALVE AREA P 1/2 METHOD: 5.37 CM2
PV PEAK GRADIENT: 3 MMHG
RIGHT ATRIUM AREA SYSTOLE A4C: 18.8 CM2
RIGHT VENTRICLE ID DIMENSION: 3.6 CM
SL CV LEFT ATRIUM LENGTH A2C: 6.2 CM
SL CV PED ECHO LEFT VENTRICLE DIASTOLIC VOLUME (MOD BIPLANE) 2D: 170 ML
SL CV PED ECHO LEFT VENTRICLE SYSTOLIC VOLUME (MOD BIPLANE) 2D: 67 ML
TR MAX PG: 38 MMHG
TR PEAK VELOCITY: 3.1 M/S
TRICUSPID VALVE PEAK REGURGITATION VELOCITY: 3.1 M/S
Z-SCORE OF INTERVENTRICULAR SEPTUM IN END DIASTOLE: 1.91
Z-SCORE OF LEFT VENTRICULAR DIMENSION IN END DIASTOLE: -0.3
Z-SCORE OF LEFT VENTRICULAR DIMENSION IN END SYSTOLE: 0.36
Z-SCORE OF LEFT VENTRICULAR POSTERIOR WALL IN END DIASTOLE: 1.19

## 2022-03-11 PROCEDURE — 93306 TTE W/DOPPLER COMPLETE: CPT | Performed by: INTERNAL MEDICINE

## 2022-03-11 PROCEDURE — 71046 X-RAY EXAM CHEST 2 VIEWS: CPT

## 2022-03-11 PROCEDURE — 93306 TTE W/DOPPLER COMPLETE: CPT

## 2022-04-04 ENCOUNTER — HOSPITAL ENCOUNTER (OUTPATIENT)
Dept: RADIOLOGY | Facility: HOSPITAL | Age: 77
Discharge: HOME | End: 2022-04-04
Payer: MEDICARE

## 2022-04-04 DIAGNOSIS — I65.23 OCCLUSION AND STENOSIS OF BILATERAL CAROTID ARTERIES: ICD-10-CM

## 2022-04-04 DIAGNOSIS — G31.84 MILD COGNITIVE IMPAIRMENT, SO STATED: ICD-10-CM

## 2022-04-04 PROCEDURE — G1004 CDSM NDSC: HCPCS

## 2022-04-04 PROCEDURE — 70551 MRI BRAIN STEM W/O DYE: CPT

## 2022-08-23 ENCOUNTER — RA CDI HCC (OUTPATIENT)
Dept: OTHER | Facility: HOSPITAL | Age: 77
End: 2022-08-23

## 2022-08-23 NOTE — PROGRESS NOTES
I13 0  [facility] [place] [facility]  coding opportunities          Chart Reviewed number of suggestions sent to Provider: 1     Patients Insurance     Medicare Insurance: [employer]

## 2022-09-02 ENCOUNTER — OFFICE VISIT (OUTPATIENT)
Dept: FAMILY MEDICINE CLINIC | Facility: CLINIC | Age: 77
End: 2022-09-02
Payer: MEDICARE

## 2022-09-02 VITALS
SYSTOLIC BLOOD PRESSURE: 130 MMHG | HEART RATE: 72 BPM | OXYGEN SATURATION: 99 % | DIASTOLIC BLOOD PRESSURE: 70 MMHG | BODY MASS INDEX: 30.92 KG/M2 | WEIGHT: 197 LBS | RESPIRATION RATE: 16 BRPM | HEIGHT: 67 IN

## 2022-09-02 DIAGNOSIS — N83.202 LEFT OVARIAN CYST: ICD-10-CM

## 2022-09-02 DIAGNOSIS — J44.9 CHRONIC OBSTRUCTIVE PULMONARY DISEASE, UNSPECIFIED COPD TYPE (HCC): ICD-10-CM

## 2022-09-02 DIAGNOSIS — E78.2 MIXED HYPERLIPIDEMIA: ICD-10-CM

## 2022-09-02 DIAGNOSIS — J30.0 VASOMOTOR RHINITIS: ICD-10-CM

## 2022-09-02 DIAGNOSIS — N18.4 CHRONIC RENAL DISEASE, STAGE IV (HCC): ICD-10-CM

## 2022-09-02 DIAGNOSIS — F03.91 DEMENTIA WITH BEHAVIORAL DISTURBANCE, UNSPECIFIED DEMENTIA TYPE: ICD-10-CM

## 2022-09-02 DIAGNOSIS — I71.40 ABDOMINAL AORTIC ANEURYSM (AAA) WITHOUT RUPTURE: ICD-10-CM

## 2022-09-02 DIAGNOSIS — R21 RASH OF ENTIRE BODY: Primary | ICD-10-CM

## 2022-09-02 DIAGNOSIS — I50.9 HEART FAILURE, UNSPECIFIED HF CHRONICITY, UNSPECIFIED HEART FAILURE TYPE (HCC): ICD-10-CM

## 2022-09-02 DIAGNOSIS — N18.31 STAGE 3A CHRONIC KIDNEY DISEASE (HCC): ICD-10-CM

## 2022-09-02 DIAGNOSIS — R31.21 ASYMPTOMATIC MICROSCOPIC HEMATURIA: ICD-10-CM

## 2022-09-02 PROCEDURE — 99205 OFFICE O/P NEW HI 60 MIN: CPT | Performed by: FAMILY MEDICINE

## 2022-09-02 RX ORDER — MELATONIN
1000 DAILY
COMMUNITY
End: 2022-09-28

## 2022-09-02 RX ORDER — LISINOPRIL 5 MG/1
TABLET ORAL
COMMUNITY
End: 2022-09-28

## 2022-09-02 RX ORDER — TORSEMIDE 20 MG/1
20 TABLET ORAL DAILY
COMMUNITY
Start: 2022-02-24 | End: 2022-09-28

## 2022-09-02 RX ORDER — LORAZEPAM 1 MG/1
1 TABLET ORAL DAILY
COMMUNITY
Start: 2022-03-07 | End: 2022-09-02

## 2022-09-02 RX ORDER — IPRATROPIUM BROMIDE 21 UG/1
2 SPRAY, METERED NASAL EVERY 12 HOURS
Qty: 30 ML | Refills: 0 | Status: SHIPPED | OUTPATIENT
Start: 2022-09-02

## 2022-09-02 RX ORDER — GABAPENTIN 100 MG/1
100 CAPSULE ORAL DAILY
COMMUNITY
Start: 2022-06-17 | End: 2022-09-02

## 2022-09-02 RX ORDER — CLOBETASOL PROPIONATE 0.5 MG/G
CREAM TOPICAL 2 TIMES DAILY
COMMUNITY
Start: 2022-06-07 | End: 2022-09-28

## 2022-09-02 NOTE — ASSESSMENT & PLAN NOTE
Going on for few months now  Recommended supportive care for itching including over-the-counter topical medication Allegra  Recommended Dermatology evaluation for longstanding rash

## 2022-09-02 NOTE — PROGRESS NOTES
Subjective:      Patient ID: [facility] Ar is a 68 y o  female  HPI    Patient is here to establish care  Patient is here with her daughter who is her primary caregiver  Patient has dementia, on Aricept 5 milligram   Daughter is concerned about an ongoing rash which has been present for a few months  The rash started on her neck and upper back  Present leak extending on to her arms and her lower back  Patient reports intense itching  Cold compresses are helping  Past history complicated with history of coronary artery disease with cardiac stents  Also has history of CHF, chronic renal failure history of tachycardia-  bradycardia syndrome, mitral regurgitation, history of myocardial infarction  Follows up with Cardiology  Currently on metoprolol  Was taken off lisinopril and atorvastatin by previous PCP  Patient is due for a follow-up with Cardiology now  Daughter is advised to contact the cardiologist to discuss the need to restart lisinopril and atorvastatin  Patient is also on torsemide which was previously managed by her PCP  I would defer the decision for torsemide management and dosing to the Cardiology  Daughter is concerned about frequent urinary symptoms  Reviewing labs in her chart review that she has persisting hematuria since 2017  Patient underwent a CT scan in 2020 which did not reveal any renal stones  I would recommend her to repeat the urinalysis, this might require further workup including assessment by urology  Per abdominal CT scan patient noted to have infrarenal abdominal aortic aneurysm  Also noted to have 1 4 centimeter left ovarian density which requires further workup  Patient has denied any symptoms of chest pain/shortness of breath or abdominal pain  Patient's daughter is concerned about these findings and is requesting to pursue further assessment and management of the incidental findings noted in 2020       Past Medical History:   Diagnosis Date    Arthritis     Coronary artery disease     Hypertension        History reviewed  No pertinent family history  History reviewed  No pertinent surgical history  reports that she has quit smoking  Her smoking use included cigarettes  She has never used smokeless tobacco  She reports that she does not drink alcohol and does not use drugs  Current Outpatient Medications:     aspirin 81 mg chewable tablet, Chew 162 mg daily, Disp: , Rfl:     cholecalciferol (VITAMIN D3) 1,000 units tablet, Take 1,000 Units by mouth daily, Disp: , Rfl:     donepezil (ARICEPT) 5 mg tablet, Take 5 mg by mouth daily at bedtime, Disp: , Rfl:     ipratropium (ATROVENT) 0 03 % nasal spray, 2 sprays into each nostril every 12 (twelve) hours, Disp: 30 mL, Rfl: 0    metoprolol tartrate (LOPRESSOR) 25 mg tablet, Take 25 mg by mouth 2 (two) times a day, Disp: , Rfl:     torsemide (DEMADEX) 20 mg tablet, Take 20 mg by mouth daily, Disp: , Rfl:     atorvastatin (LIPITOR) 10 mg tablet, Take 10 mg by mouth daily (Patient not taking: Reported on 9/2/2022), Disp: , Rfl:     clobetasol (TEMOVATE) 0 05 % cream, Apply topically 2 (two) times a day (Patient not taking: Reported on 9/2/2022), Disp: , Rfl:     fexofenadine (ALLEGRA) 180 MG tablet, Take 180 mg by mouth daily (Patient not taking: Reported on 9/2/2022), Disp: , Rfl:     lisinopril (ZESTRIL) 5 mg tablet, Take by mouth (Patient not taking: Reported on 9/2/2022), Disp: , Rfl:     Potassium 99 MG TABS, Take 99 mg by mouth (Patient not taking: Reported on 9/2/2022), Disp: , Rfl:     The following portions of the patient's history were reviewed and updated as appropriate: allergies, current medications, past family history, past medical history, past social history, past surgical history and problem list     Review of Systems   Constitutional: Negative  Respiratory: Negative  Negative for shortness of breath  Cardiovascular: Negative  Negative for chest pain and palpitations  Musculoskeletal: Negative for myalgias  Skin: Positive for rash  Neurological: Negative  Psychiatric/Behavioral: Negative  Objective:    /70   Pulse 72   Resp 16   Ht 5' 7" (1 702 m)   Wt 89 4 kg (197 lb)   SpO2 99%   BMI 30 85 kg/m²      Physical Exam  Constitutional:       Appearance: She is well-developed  Cardiovascular:      Rate and Rhythm: Normal rate and regular rhythm  Heart sounds: Normal heart sounds  Pulmonary:      Effort: Pulmonary effort is normal       Breath sounds: Normal breath sounds  Abdominal:      General: Bowel sounds are normal       Palpations: Abdomen is soft  Skin:     Findings: Rash present  Comments: Erythematous Papular rash with excoriations, scratch marks  Neurological:      General: No focal deficit present  Mental Status: She is alert  Mental status is at baseline  Psychiatric:         Behavior: Behavior normal          Judgment: Judgment normal            No results found for this or any previous visit (from the past 1008 hour(s))  Assessment/Plan:    No problem-specific Assessment & Plan notes found for this encounter  Problem List Items Addressed This Visit        Endocrine    Left ovarian cyst     Per abdominal CT scan from 2020, reported  Rounded density in the left ovary measuring 1 4 cm, not definitely cystic  Recommended pelvic ultrasound  I am going to order the pelvic ultrasound today to assess ovarian cyst          Relevant Orders    US pelvis complete non OB       Respiratory    Chronic obstructive pulmonary disease, unspecified COPD type ([facility] [address] )     Breathing it is at baseline  Currently patient is not on any inhalers  Relevant Medications    ipratropium (ATROVENT) 0 03 % nasal spray    Vasomotor rhinitis     Previously not responded to over-the-counter antihistamine, Flonase  Will start on a trial of Atrovent           Relevant Medications    ipratropium (ATROVENT) 0 03 % nasal spray       Cardiovascular and Mediastinum    Abdominal aortic aneurysm (AAA) without rupture ([facility] [address] )     Per CT 2020,  4 1 cm infrarenal abdominal aortic aneurysm just below the renal arteries with additional aneurysm more distally measuring 3 7 cm  Severe atherosclerotic calcification of the iliac vessels  Patient has dementia and daughter is unaware of this  Requesting further evaluation with vascular for more consistent monitoring/ management                 Relevant Orders    Ambulatory Referral to Vascular Surgery    Heart failure, unspecified HF chronicity, unspecified heart failure type ([facility] [address] )     Wt Readings from Last 3 Encounters:   09/02/22 89 4 kg (197 lb)   03/11/22 86 2 kg (190 lb)   12/04/21 86 2 kg (190 lb)     Patient denies any symptoms of shortness of breath  Currently on metoprolol and torsemide  Will defer the dose / refill  of torsemide to Cardiology  Nervous and Auditory    Dementia with behavioral disturbance, unspecified dementia type ([facility] [address] )     Patient on Aricept 5 milligram   Continue same  Musculoskeletal and Integument    Rash of entire body - Primary     Going on for few months now  Recommended supportive care for itching including over-the-counter topical medication Allegra  Recommended Dermatology evaluation for longstanding rash  Relevant Orders    Ambulatory Referral to Dermatology       Genitourinary    Stage 3a chronic kidney disease ([facility] [address] )    Asymptomatic microscopic hematuria     Persisting since 2017  Patient asymptomatic  Abdominal CT scan from 2020 did not relieve any renal stones  Recommended repeat urinalysis   Relevant Orders    UA w Reflex to Microscopic w Reflex to Culture -Lab Collect    RESOLVED: Chronic renal disease, stage IV (HCC)       Other    Hyperlipidemia     LDL is at goal   Atorvastatin discontinued by previous PCP  Patient has history of CAD  Recommended to repeat lipid panel    Daughter advised to discuss the need to restart atorvastatin and lisinopril at her upcoming cardiology appointment         Relevant Orders    Comprehensive metabolic panel    Lipid panel        I have spent 60 minutes with Patient and family today in which greater than 50% of this time was spent in counseling/coordination of care regarding Diagnostic results, Prognosis, Risks and benefits of tx options, Intructions for management, Patient and family education, Importance of tx compliance, Risk factor reductions and Impressions

## 2022-09-02 NOTE — ASSESSMENT & PLAN NOTE
Previously not responded to over-the-counter antihistamine, Flonase  Will start on a trial of Atrovent

## 2022-09-02 NOTE — ASSESSMENT & PLAN NOTE
Per abdominal CT scan from 2020, reported  Rounded density in the left ovary measuring 1 4 cm, not definitely cystic  Recommended pelvic ultrasound    I am going to order the pelvic ultrasound today to assess ovarian cyst

## 2022-09-02 NOTE — ASSESSMENT & PLAN NOTE
Lab Results   Component Value Date    EGFR 23 02/26/2022    EGFR 33 12/04/2021    EGFR 38 10/13/2021    CREATININE 2 01 (H) 02/26/2022    CREATININE 1 52 (H) 12/04/2021    CREATININE 1 35 (H) 10/13/2021   Labs reviewed today  Reassess renal function

## 2022-09-02 NOTE — ASSESSMENT & PLAN NOTE
Wt Readings from Last 3 Encounters:   09/02/22 89 4 kg (197 lb)   03/11/22 86 2 kg (190 lb)   12/04/21 86 2 kg (190 lb)     Patient denies any symptoms of shortness of breath  Currently on metoprolol and torsemide  Will defer the dose / refill  of torsemide to Cardiology

## 2022-09-02 NOTE — ASSESSMENT & PLAN NOTE
Per CT 2020,  4 1 cm infrarenal abdominal aortic aneurysm just below the renal arteries with additional aneurysm more distally measuring 3 7 cm  Severe atherosclerotic calcification of the iliac vessels  Patient has dementia and daughter is unaware of this   Requesting further evaluation with vascular for more consistent monitoring/ management

## 2022-09-02 NOTE — ASSESSMENT & PLAN NOTE
Persisting since 2017  Patient asymptomatic  Abdominal CT scan from 2020 did not relieve any renal stones  Recommended repeat urinalysis

## 2022-09-02 NOTE — ASSESSMENT & PLAN NOTE
LDL is at goal   Atorvastatin discontinued by previous PCP  Patient has history of CAD  Recommended to repeat lipid panel    Daughter advised to discuss the need to restart atorvastatin and lisinopril at her upcoming cardiology appointment

## 2022-09-13 ENCOUNTER — NEW PATIENT COMPREHENSIVE (OUTPATIENT)
Dept: URBAN - METROPOLITAN AREA CLINIC 6 | Facility: CLINIC | Age: 77
End: 2022-09-13

## 2022-09-13 DIAGNOSIS — H25.813: ICD-10-CM

## 2022-09-13 PROCEDURE — 92004 COMPRE OPH EXAM NEW PT 1/>: CPT

## 2022-09-13 ASSESSMENT — VISUAL ACUITY
OS_SC: 20/400
OD_SC: 20/200

## 2022-09-13 ASSESSMENT — TONOMETRY
OS_IOP_MMHG: 16
OD_IOP_MMHG: 17

## 2022-09-19 ENCOUNTER — TELEPHONE (OUTPATIENT)
Dept: FAMILY MEDICINE CLINIC | Facility: CLINIC | Age: 77
End: 2022-09-19

## 2022-09-19 NOTE — TELEPHONE ENCOUNTER
Patient's daughter, Mallory Govea, called following up on her mother's visit with dermatology  She saw Dr Serena Ordonez and was told that the rash was due to the sulfites in her water pill  They would like to know if a water pill without sulfites could be prescribed

## 2022-09-28 ENCOUNTER — APPOINTMENT (EMERGENCY)
Dept: RADIOLOGY | Facility: HOSPITAL | Age: 77
DRG: 291 | End: 2022-09-28
Payer: MEDICARE

## 2022-09-28 ENCOUNTER — HOSPITAL ENCOUNTER (INPATIENT)
Facility: HOSPITAL | Age: 77
LOS: 8 days | Discharge: NURSING, REHAB OR LONG-TERM CARE | DRG: 291 | End: 2022-10-07
Attending: EMERGENCY MEDICINE | Admitting: STUDENT IN AN ORGANIZED HEALTH CARE EDUCATION/TRAINING PROGRAM
Payer: MEDICARE

## 2022-09-28 ENCOUNTER — APPOINTMENT (OUTPATIENT)
Dept: RADIOLOGY | Facility: HOSPITAL | Age: 77
DRG: 291 | End: 2022-09-28
Payer: MEDICARE

## 2022-09-28 DIAGNOSIS — R21 RASH AND NONSPECIFIC SKIN ERUPTION: ICD-10-CM

## 2022-09-28 DIAGNOSIS — I48.0 PAF (PAROXYSMAL ATRIAL FIBRILLATION) (HCC): ICD-10-CM

## 2022-09-28 DIAGNOSIS — M19.90 ARTHRITIS: ICD-10-CM

## 2022-09-28 DIAGNOSIS — I50.9 ACUTE EXACERBATION OF CHF (CONGESTIVE HEART FAILURE) (HCC): Primary | ICD-10-CM

## 2022-09-28 DIAGNOSIS — N18.31 STAGE 3A CHRONIC KIDNEY DISEASE (HCC): ICD-10-CM

## 2022-09-28 DIAGNOSIS — I25.2: ICD-10-CM

## 2022-09-28 DIAGNOSIS — K59.00 CONSTIPATION: ICD-10-CM

## 2022-09-28 DIAGNOSIS — I49.9 ARRHYTHMIA: ICD-10-CM

## 2022-09-28 DIAGNOSIS — B88.8 INFESTATION BY BED BUG: ICD-10-CM

## 2022-09-28 DIAGNOSIS — I34.0: ICD-10-CM

## 2022-09-28 LAB
2HR DELTA HS TROPONIN: 0 NG/L
4HR DELTA HS TROPONIN: -1 NG/L
ALBUMIN SERPL BCP-MCNC: 3.4 G/DL (ref 3.5–5)
ALP SERPL-CCNC: 86 U/L (ref 46–116)
ALT SERPL W P-5'-P-CCNC: 16 U/L (ref 12–78)
ANION GAP SERPL CALCULATED.3IONS-SCNC: 8 MMOL/L (ref 4–13)
AST SERPL W P-5'-P-CCNC: 19 U/L (ref 5–45)
BASOPHILS # BLD AUTO: 0.06 THOUSANDS/ΜL (ref 0–0.1)
BASOPHILS NFR BLD AUTO: 1 % (ref 0–1)
BILIRUB SERPL-MCNC: 0.61 MG/DL (ref 0.2–1)
BUN SERPL-MCNC: 21 MG/DL (ref 5–25)
CALCIUM ALBUM COR SERPL-MCNC: 9.2 MG/DL (ref 8.3–10.1)
CALCIUM SERPL-MCNC: 8.7 MG/DL (ref 8.3–10.1)
CARDIAC TROPONIN I PNL SERPL HS: 10 NG/L
CARDIAC TROPONIN I PNL SERPL HS: 11 NG/L
CARDIAC TROPONIN I PNL SERPL HS: 11 NG/L
CHLORIDE SERPL-SCNC: 106 MMOL/L (ref 96–108)
CO2 SERPL-SCNC: 28 MMOL/L (ref 21–32)
CREAT SERPL-MCNC: 1.76 MG/DL (ref 0.6–1.3)
EOSINOPHIL # BLD AUTO: 0.2 THOUSAND/ΜL (ref 0–0.61)
EOSINOPHIL NFR BLD AUTO: 3 % (ref 0–6)
ERYTHROCYTE [DISTWIDTH] IN BLOOD BY AUTOMATED COUNT: 16.3 % (ref 11.6–15.1)
GFR SERPL CREATININE-BSD FRML MDRD: 27 ML/MIN/1.73SQ M
GLUCOSE SERPL-MCNC: 88 MG/DL (ref 65–140)
HCT VFR BLD AUTO: 40 % (ref 34.8–46.1)
HGB BLD-MCNC: 12.4 G/DL (ref 11.5–15.4)
IMM GRANULOCYTES # BLD AUTO: 0.03 THOUSAND/UL (ref 0–0.2)
IMM GRANULOCYTES NFR BLD AUTO: 1 % (ref 0–2)
LYMPHOCYTES # BLD AUTO: 1.22 THOUSANDS/ΜL (ref 0.6–4.47)
LYMPHOCYTES NFR BLD AUTO: 19 % (ref 14–44)
MCH RBC QN AUTO: 31.1 PG (ref 26.8–34.3)
MCHC RBC AUTO-ENTMCNC: 31 G/DL (ref 31.4–37.4)
MCV RBC AUTO: 100 FL (ref 82–98)
MONOCYTES # BLD AUTO: 0.62 THOUSAND/ΜL (ref 0.17–1.22)
MONOCYTES NFR BLD AUTO: 10 % (ref 4–12)
NEUTROPHILS # BLD AUTO: 4.27 THOUSANDS/ΜL (ref 1.85–7.62)
NEUTS SEG NFR BLD AUTO: 66 % (ref 43–75)
NRBC BLD AUTO-RTO: 0 /100 WBCS
NT-PROBNP SERPL-MCNC: ABNORMAL PG/ML
PLATELET # BLD AUTO: 177 THOUSANDS/UL (ref 149–390)
PMV BLD AUTO: 10.9 FL (ref 8.9–12.7)
POTASSIUM SERPL-SCNC: 4.4 MMOL/L (ref 3.5–5.3)
PROT SERPL-MCNC: 7.5 G/DL (ref 6.4–8.4)
RBC # BLD AUTO: 3.99 MILLION/UL (ref 3.81–5.12)
SODIUM SERPL-SCNC: 142 MMOL/L (ref 135–147)
WBC # BLD AUTO: 6.4 THOUSAND/UL (ref 4.31–10.16)

## 2022-09-28 PROCEDURE — 84484 ASSAY OF TROPONIN QUANT: CPT | Performed by: EMERGENCY MEDICINE

## 2022-09-28 PROCEDURE — 36415 COLL VENOUS BLD VENIPUNCTURE: CPT | Performed by: EMERGENCY MEDICINE

## 2022-09-28 PROCEDURE — 85025 COMPLETE CBC W/AUTO DIFF WBC: CPT | Performed by: EMERGENCY MEDICINE

## 2022-09-28 PROCEDURE — 93005 ELECTROCARDIOGRAM TRACING: CPT

## 2022-09-28 PROCEDURE — 93970 EXTREMITY STUDY: CPT

## 2022-09-28 PROCEDURE — 80053 COMPREHEN METABOLIC PANEL: CPT | Performed by: EMERGENCY MEDICINE

## 2022-09-28 PROCEDURE — 83880 ASSAY OF NATRIURETIC PEPTIDE: CPT | Performed by: EMERGENCY MEDICINE

## 2022-09-28 PROCEDURE — 99285 EMERGENCY DEPT VISIT HI MDM: CPT

## 2022-09-28 PROCEDURE — 99285 EMERGENCY DEPT VISIT HI MDM: CPT | Performed by: EMERGENCY MEDICINE

## 2022-09-28 PROCEDURE — 73564 X-RAY EXAM KNEE 4 OR MORE: CPT

## 2022-09-28 PROCEDURE — 71045 X-RAY EXAM CHEST 1 VIEW: CPT

## 2022-09-28 RX ORDER — TRAMADOL HYDROCHLORIDE 50 MG/1
50 TABLET ORAL EVERY 6 HOURS PRN
Status: DISCONTINUED | OUTPATIENT
Start: 2022-09-28 | End: 2022-10-07 | Stop reason: HOSPADM

## 2022-09-28 RX ORDER — ASPIRIN 81 MG/1
81 TABLET, CHEWABLE ORAL DAILY
Status: DISCONTINUED | OUTPATIENT
Start: 2022-09-29 | End: 2022-10-07 | Stop reason: HOSPADM

## 2022-09-28 RX ORDER — BUMETANIDE 0.25 MG/ML
1 INJECTION, SOLUTION INTRAMUSCULAR; INTRAVENOUS EVERY 12 HOURS
Status: DISCONTINUED | OUTPATIENT
Start: 2022-09-29 | End: 2022-09-30

## 2022-09-28 RX ORDER — ONDANSETRON 2 MG/ML
4 INJECTION INTRAMUSCULAR; INTRAVENOUS EVERY 6 HOURS PRN
Status: DISCONTINUED | OUTPATIENT
Start: 2022-09-28 | End: 2022-10-07 | Stop reason: HOSPADM

## 2022-09-28 RX ORDER — ACETAMINOPHEN 325 MG/1
650 TABLET ORAL EVERY 6 HOURS PRN
Status: DISCONTINUED | OUTPATIENT
Start: 2022-09-28 | End: 2022-10-07 | Stop reason: HOSPADM

## 2022-09-28 RX ORDER — HEPARIN SODIUM 5000 [USP'U]/ML
5000 INJECTION, SOLUTION INTRAVENOUS; SUBCUTANEOUS EVERY 8 HOURS SCHEDULED
Status: DISCONTINUED | OUTPATIENT
Start: 2022-09-29 | End: 2022-09-30

## 2022-09-28 RX ORDER — IPRATROPIUM BROMIDE 21 UG/1
2 SPRAY, METERED NASAL EVERY 12 HOURS
Status: DISCONTINUED | OUTPATIENT
Start: 2022-09-29 | End: 2022-10-07 | Stop reason: HOSPADM

## 2022-09-28 RX ORDER — ETHACRYNIC ACID 25 MG/1
25 TABLET ORAL DAILY
COMMUNITY
End: 2022-10-07

## 2022-09-29 PROBLEM — B88.8 INFESTATION BY BED BUG: Status: ACTIVE | Noted: 2022-09-29

## 2022-09-29 PROBLEM — N18.4 CKD (CHRONIC KIDNEY DISEASE) STAGE 4, GFR 15-29 ML/MIN (HCC): Status: ACTIVE | Noted: 2021-05-07

## 2022-09-29 LAB
ANION GAP SERPL CALCULATED.3IONS-SCNC: 9 MMOL/L (ref 4–13)
ATRIAL RATE: 79 BPM
BASOPHILS # BLD AUTO: 0.05 THOUSANDS/ΜL (ref 0–0.1)
BASOPHILS NFR BLD AUTO: 1 % (ref 0–1)
BUN SERPL-MCNC: 19 MG/DL (ref 5–25)
CALCIUM SERPL-MCNC: 8.3 MG/DL (ref 8.3–10.1)
CHLORIDE SERPL-SCNC: 106 MMOL/L (ref 96–108)
CO2 SERPL-SCNC: 26 MMOL/L (ref 21–32)
CREAT SERPL-MCNC: 1.73 MG/DL (ref 0.6–1.3)
EOSINOPHIL # BLD AUTO: 0.09 THOUSAND/ΜL (ref 0–0.61)
EOSINOPHIL NFR BLD AUTO: 1 % (ref 0–6)
ERYTHROCYTE [DISTWIDTH] IN BLOOD BY AUTOMATED COUNT: 16.2 % (ref 11.6–15.1)
GFR SERPL CREATININE-BSD FRML MDRD: 28 ML/MIN/1.73SQ M
GLUCOSE SERPL-MCNC: 99 MG/DL (ref 65–140)
HCT VFR BLD AUTO: 36.7 % (ref 34.8–46.1)
HGB BLD-MCNC: 11.5 G/DL (ref 11.5–15.4)
IMM GRANULOCYTES # BLD AUTO: 0.01 THOUSAND/UL (ref 0–0.2)
IMM GRANULOCYTES NFR BLD AUTO: 0 % (ref 0–2)
LYMPHOCYTES # BLD AUTO: 0.85 THOUSANDS/ΜL (ref 0.6–4.47)
LYMPHOCYTES NFR BLD AUTO: 13 % (ref 14–44)
MCH RBC QN AUTO: 31.1 PG (ref 26.8–34.3)
MCHC RBC AUTO-ENTMCNC: 31.3 G/DL (ref 31.4–37.4)
MCV RBC AUTO: 99 FL (ref 82–98)
MONOCYTES # BLD AUTO: 0.71 THOUSAND/ΜL (ref 0.17–1.22)
MONOCYTES NFR BLD AUTO: 11 % (ref 4–12)
NEUTROPHILS # BLD AUTO: 4.72 THOUSANDS/ΜL (ref 1.85–7.62)
NEUTS SEG NFR BLD AUTO: 74 % (ref 43–75)
NRBC BLD AUTO-RTO: 0 /100 WBCS
P AXIS: 78 DEGREES
PLATELET # BLD AUTO: 170 THOUSANDS/UL (ref 149–390)
PMV BLD AUTO: 11 FL (ref 8.9–12.7)
POTASSIUM SERPL-SCNC: 4 MMOL/L (ref 3.5–5.3)
PR INTERVAL: 122 MS
QRS AXIS: 52 DEGREES
QRSD INTERVAL: 88 MS
QT INTERVAL: 420 MS
QTC INTERVAL: 481 MS
RBC # BLD AUTO: 3.7 MILLION/UL (ref 3.81–5.12)
SODIUM SERPL-SCNC: 141 MMOL/L (ref 135–147)
T WAVE AXIS: -74 DEGREES
VENTRICULAR RATE: 79 BPM
WBC # BLD AUTO: 6.43 THOUSAND/UL (ref 4.31–10.16)

## 2022-09-29 PROCEDURE — 97530 THERAPEUTIC ACTIVITIES: CPT

## 2022-09-29 PROCEDURE — 93970 EXTREMITY STUDY: CPT | Performed by: SURGERY

## 2022-09-29 PROCEDURE — 97163 PT EVAL HIGH COMPLEX 45 MIN: CPT

## 2022-09-29 PROCEDURE — 80048 BASIC METABOLIC PNL TOTAL CA: CPT

## 2022-09-29 PROCEDURE — 85025 COMPLETE CBC W/AUTO DIFF WBC: CPT

## 2022-09-29 PROCEDURE — 99232 SBSQ HOSP IP/OBS MODERATE 35: CPT | Performed by: STUDENT IN AN ORGANIZED HEALTH CARE EDUCATION/TRAINING PROGRAM

## 2022-09-29 PROCEDURE — 93010 ELECTROCARDIOGRAM REPORT: CPT | Performed by: INTERNAL MEDICINE

## 2022-09-29 PROCEDURE — 94664 DEMO&/EVAL PT USE INHALER: CPT

## 2022-09-29 PROCEDURE — 99220 PR INITIAL OBSERVATION CARE/DAY 70 MINUTES: CPT

## 2022-09-29 RX ORDER — DIPHENHYDRAMINE HYDROCHLORIDE 50 MG/ML
25 INJECTION INTRAMUSCULAR; INTRAVENOUS EVERY 6 HOURS PRN
Status: DISCONTINUED | OUTPATIENT
Start: 2022-09-29 | End: 2022-09-30

## 2022-09-29 RX ORDER — DIAPER,BRIEF,INFANT-TODD,DISP
EACH MISCELLANEOUS 4 TIMES DAILY PRN
Status: DISCONTINUED | OUTPATIENT
Start: 2022-09-29 | End: 2022-10-07 | Stop reason: HOSPADM

## 2022-09-29 RX ADMIN — HEPARIN SODIUM 5000 UNITS: 5000 INJECTION INTRAVENOUS; SUBCUTANEOUS at 00:03

## 2022-09-29 RX ADMIN — BUMETANIDE 1 MG: 0.25 INJECTION, SOLUTION INTRAMUSCULAR; INTRAVENOUS at 00:02

## 2022-09-29 RX ADMIN — HEPARIN SODIUM 5000 UNITS: 5000 INJECTION INTRAVENOUS; SUBCUTANEOUS at 21:34

## 2022-09-29 RX ADMIN — METOPROLOL TARTRATE 25 MG: 25 TABLET, FILM COATED ORAL at 17:17

## 2022-09-29 RX ADMIN — HEPARIN SODIUM 5000 UNITS: 5000 INJECTION INTRAVENOUS; SUBCUTANEOUS at 14:03

## 2022-09-29 RX ADMIN — ASPIRIN 81 MG CHEWABLE TABLET 81 MG: 81 TABLET CHEWABLE at 09:26

## 2022-09-29 RX ADMIN — TRAMADOL HYDROCHLORIDE 50 MG: 50 TABLET, COATED ORAL at 23:27

## 2022-09-29 RX ADMIN — METOPROLOL TARTRATE 25 MG: 25 TABLET, FILM COATED ORAL at 09:26

## 2022-09-29 RX ADMIN — HEPARIN SODIUM 5000 UNITS: 5000 INJECTION INTRAVENOUS; SUBCUTANEOUS at 05:36

## 2022-09-29 RX ADMIN — BUMETANIDE 1 MG: 0.25 INJECTION, SOLUTION INTRAMUSCULAR; INTRAVENOUS at 11:47

## 2022-09-29 RX ADMIN — BUMETANIDE 1 MG: 0.25 INJECTION, SOLUTION INTRAMUSCULAR; INTRAVENOUS at 23:07

## 2022-09-29 NOTE — CASE MANAGEMENT
Case Management Assessment & Discharge Planning Note    Patient name Valentina Freeman  Location [address] [place] [address] MRN [id number]  : 1945 Date 2022       Current Admission Date: 2022  Current Admission Diagnosis:Acute exacerbation of CHF (congestive heart failure) [facility])   Patient Active Problem List    Diagnosis Date Noted    Infestation by bed bug 2022    Rash and nonspecific skin eruption 2022    Dementia with behavioral disturbance, unspecified dementia type ([facility] ) 2022    Chronic obstructive pulmonary disease, unspecified COPD type ([place] [address] ) 2022    Acute exacerbation of CHF (congestive heart failure) ([place] [address] ) 2022    Vasomotor rhinitis 2022    Left ovarian cyst 2022    Stage 3a chronic kidney disease ([place] [address] ) 2022    Asymptomatic microscopic hematuria 2022    Chest pain 2021    CKD (chronic kidney disease) stage 4, GFR 15-29 ml/min ([facility] ) 2021    Abdominal aortic aneurysm (AAA) without rupture ([place] [address] ) 2020    Belching 2020    Dyspepsia 2020    Colon cancer screening 2020    Nausea 2020    Essential hypertension     Coronary artery disease involving native coronary artery of native heart without angina pectoris     Arthritis       LOS (days): 0  Geometric Mean LOS (GMLOS) (days):   Days to GMLOS:     OBJECTIVE:     Current admission status: Observation  Referral Reason: Placement within 24-48 hours    Preferred Pharmacy:   [address]  [address] [place] [address] 93  [id number]  Phone: [phone] Fax: [phone]    Primary Care Provider: Lola Santos MD    Primary Insurance: MEDICARE  Secondary Insurance: COMMERCIAL MISCELLANEOUS    ASSESSMENT:  [address] [facility] [address] Representative - Daughter   Primary Phone: [phone] (Mobile)               Advance Directives  Does patient have a Health Care POA?: Yes  Does patient have Advance Directives?: Yes  Advance Directives: Power of  for health care  Primary Contact: Daughter Mallory Govea    Readmission Root Cause  30 Day Readmission: No    Patient Information  Admitted from[de-identified] Home  Mental Status: Alert, Confused  During Assessment patient was accompanied by: Not accompanied during assessment  Assessment information provided by[de-identified] Daughter  Primary Caregiver: Child  Caregiver's Name[de-identified] Omer Pal Spare Relationship to Patient[de-identified] Family Member  Caregiver's Telephone Number[de-identified] [phone]  Support Systems: Daughter, Family members  [place] of Residence: [address] [place] [address] do you live in?: BlogBus entry access options   Select all that apply : Stairs  Number of steps to enter home : 3  Do the steps have railings?: Yes  Type of Current Residence: [address] home  Upon entering residence, is there a bedroom on the main floor (no further steps)?: No  A bedroom is located on the following floor levels of residence (select all that apply):: 2nd Floor  Upon entering residence, is there a bathroom on the main floor (no further steps)?: Yes  Number of steps to 2nd floor from main floor: One Flight  In the last 12 months, was there a time when you were not able to pay the mortgage or rent on time?: No  In the last 12 months, was there a time when you did not have a steady place to sleep or slept in a shelter (including now)?: No  Homeless/housing insecurity resource given?: No  Living Arrangements: Lives Alone  Is patient a ?: No    Activities of Daily Living Prior to Admission  Functional Status: Independent  Completes ADLs independently?: Yes  Ambulates independently?: Yes  Does patient use assisted devices?: No  Does patient currently own DME?: No  Does patient have a history of Outpatient Therapy (PT/OT)?: No  Does the patient have a history of Short-Term Rehab?: No  Does patient have a history of HHC?: No  Does patient currently have Joe ?: Yes    Current Home Health Care  Type of Current Home Care Services: Home health aide (1 hour daily through the [facility])  [address][de-identified] Other (please enter name in comment) [facility] program)    Patient Information Continued  Income Source: Pension/jail  Does patient have prescription coverage?: Yes  Food insecurity resource given?: No  Does patient receive dialysis treatments?: No  Does patient have a history of substance abuse?: No  Does patient have a history of Mental Health Diagnosis?: Yes (Dementia)    PHQ 2/9 Screening   Reviewed PHQ 2/9 Depression Screening Score?: No    Means of Transportation  Means of Transport to Appts[de-identified] Family transport  In the past 12 months, has lack of transportation kept you from medical appointments or from getting medications?: No  In the past 12 months, has lack of transportation kept you from meetings, work, or from getting things needed for daily living?: No  Was application for public transport provided?: No    DISCHARGE DETAILS:    Discharge planning discussed with[de-identified] patient's daughter Ni Farooq of Choice: Yes     CM contacted family/caregiver?: Yes  Were Treatment Team discharge recommendations reviewed with patient/caregiver?: Yes  Did patient/caregiver verbalize understanding of patient care needs?: N/A- going to facility  Were patient/caregiver advised of the risks associated with not following Treatment Team discharge recommendations?: Yes    Contacts  Patient Contacts: Dwight Wiseman  Relationship to Patient[de-identified] Family  Contact Method: Phone  Phone Number: [phone]  Reason/Outcome: Continuity of Care, Emergency Contact, Referral, Discharge Planning    Requested [address]         Is the patient interested in [address] at discharge?: No    DME Referral Provided  Referral made for DME?: No    Other Referral/Resources/Interventions Provided:  Interventions: Short Term Rehab  Referral Comments: [place] referrals sent to inpatient rehab facilities in [place] area, waiting for options     Discharge Destination Plan[de-identified] Short Term Rehab      CM spoke with patient's daughter Hoa Genao on the phone, [phone]  CM introduced self and role  Patient's daughter stated patient lives alone in a home setting in [address] [facility]  Patient receives county aide one hour every day with the [employer]  Per daughter, patient is not able to care for herself at home alone  Daughter expressed interest in a rehab facility at discharge  Daughter lives in [place]  Daughter preference is [place] area if available  Patient is Covid vaccinated x 2 and boosted x 1  Daughter has copy of Covid vaccination card and will bring into the hospital      CM provided daughter contact number for CM for any f/u questions/concerns  CM sent blanket referrals for inpatient rehab  Waiting for options  CM reviewed discharge planning process including the following: identifying caregivers at home, preference for d/c planning needs,   availability of [facility] program, availability of treatment team to discuss questions or concerns patient and/or family may have regarding diagnosis, plan of care, old or new medications and discharge planning   CM will continue to follow for care coordination and update assessment as appropriate

## 2022-09-29 NOTE — ASSESSMENT & PLAN NOTE
Lab Results   Component Value Date    EGFR 27 09/28/2022    EGFR 23 02/26/2022    EGFR 33 12/04/2021    CREATININE 1 76 (H) 09/28/2022    CREATININE 2 01 (H) 02/26/2022    CREATININE 1 52 (H) 12/04/2021     Creatinine at baseline, monitor with BMP

## 2022-09-29 NOTE — ED NOTES
Pt is forgetful and keeps removing monitoring equipment  Family sitting just outside of room       Marlyn Magaña RN  09/28/22 9280

## 2022-09-29 NOTE — PROGRESS NOTES
[address]  Progress Note - Elysia Hermelinda 1945, 68 y o  female MRN: [id number]  Unit/Bed#: [address] [place] [address] Encounter: [id number]  Primary Care Provider: Jarrod Drummond MD   Date and time admitted to hospital: 9/28/2022  5:56 PM    Infestation by bed bug  Assessment & Plan  Decontaminated in ED    Dementia with behavioral disturbance, unspecified dementia type [facility])  Assessment & Plan  Pt lives alone, daughter concerned she is unable to care for herself  · No longer on medication  · CM consult    Rash and nonspecific skin eruption  Assessment & Plan  Patient with rash over body that has been presents for a few weeks  · Seen by dermatologist and told she has sulfa allergy, reaction to torsemide  · Hydrocortisone cream, benadryl prn    CKD (chronic kidney disease) stage 4, GFR 15-29 ml/min [facility])  Assessment & Plan  Lab Results   Component Value Date    EGFR 28 09/29/2022    EGFR 27 09/28/2022    EGFR 23 02/26/2022    CREATININE 1 73 (H) 09/29/2022    CREATININE 1 76 (H) 09/28/2022    CREATININE 2 01 (H) 02/26/2022     Creatinine at baseline, monitor with BMP    Coronary artery disease involving native coronary artery of native heart without angina pectoris  Assessment & Plan  Asymptomatic,  S/p PCI in 2012 with NARGIS to RCA, 2013 NARGIS to LAD    · Troponin trend negative  · Continue metoprolol    Essential hypertension  Assessment & Plan  Chronic, Continue metoprolol 25mg bid    * Acute exacerbation of CHF (congestive heart failure) ([facility] )  Assessment & Plan  Wt Readings from Last 3 Encounters:   09/29/22 93 8 kg (206 lb 12 7 oz)   09/02/22 89 4 kg (197 lb)   03/11/22 86 2 kg (190 lb)     Pt with shortness of breath, leg edema  · Was previously on torsemide 20mg but stopped secondary to drug reaction, has been on ethacrynic acid for 5 days with no improvement, currently on Bumex   Echo shows EF 94%, normal diastolic and systolic function, moderate tricuspid regurg   BNP 10,385   Troponin trend negative   Start bumex 1g bid   Daily weights, I/Os   Fluid, sodium restriction                VTE Pharmacologic Prophylaxis: VTE Score: 4 Moderate Risk (Score 3-4) - Pharmacological DVT Prophylaxis Ordered: heparin  Patient Centered Rounds: I performed bedside rounds with nursing staff today  Discussions with Specialists or Other Care Team Provider:     Education and Discussions with Family / Patient: {Family Communication:72574}    Time Spent for Care: 30 minutes  More than 50% of total time spent on counseling and coordination of care as described above  Current Length of Stay: 0 day(s)  Current Patient Status: Observation   Certification Statement: The patient will continue to require additional inpatient hospital stay due to Clinical course  Discharge Plan: Anticipate discharge in 24-48 hrs to home  Code Status: Level 1 - Full Code    Subjective:   ***    Objective:     Vitals:   Temp (24hrs), Av 3 °F (36 8 °C), Min:97 8 °F (36 6 °C), Max:98 8 °F (37 1 °C)    Temp:  [97 8 °F (36 6 °C)-98 8 °F (37 1 °C)] 98 8 °F (37 1 °C)  HR:  [80-91] 90  Resp:  [18-44] 20  BP: (117-159)/(64-99) 144/67  SpO2:  [94 %-97 %] 95 %  Body mass index is 32 39 kg/m²  Input and Output Summary (last 24 hours): Intake/Output Summary (Last 24 hours) at 2022 0913  Last data filed at 2022 0243  Gross per 24 hour   Intake --   Output 700 ml   Net -700 ml       Physical Exam:   Physical Exam  Vitals and nursing note reviewed  Constitutional:       General: She is not in acute distress  Appearance: She is well-developed  HENT:      Head: Normocephalic and atraumatic  Eyes:      Conjunctiva/sclera: Conjunctivae normal    Cardiovascular:      Rate and Rhythm: Normal rate and regular rhythm  Heart sounds: No murmur heard  Pulmonary:      Effort: Pulmonary effort is normal  No respiratory distress  Breath sounds: Normal breath sounds  Abdominal:      Palpations: Abdomen is soft  Tenderness: There is no abdominal tenderness  Musculoskeletal:      Cervical back: Neck supple  Skin:     General: Skin is warm and dry  Neurological:      Mental Status: She is alert            Additional Data:     Labs:  Results from last 7 days   Lab Units 09/29/22  0523   WBC Thousand/uL 6 43   HEMOGLOBIN g/dL 11 5   HEMATOCRIT % 36 7   PLATELETS Thousands/uL 170   NEUTROS PCT % 74   LYMPHS PCT % 13*   MONOS PCT % 11   EOS PCT % 1     Results from last 7 days   Lab Units 09/29/22  0523 09/28/22  1848   SODIUM mmol/L 141 142   POTASSIUM mmol/L 4 0 4 4   CHLORIDE mmol/L 106 106   CO2 mmol/L 26 28   BUN mg/dL 19 21   CREATININE mg/dL 1 73* 1 76*   ANION GAP mmol/L 9 8   CALCIUM mg/dL 8 3 8 7   ALBUMIN g/dL  --  3 4*   TOTAL BILIRUBIN mg/dL  --  0 61   ALK PHOS U/L  --  86   ALT U/L  --  16   AST U/L  --  19   GLUCOSE RANDOM mg/dL 99 88                       Lines/Drains:  Invasive Devices  Report    Peripheral Intravenous Line  Duration           Peripheral IV 09/29/22 Right;Ventral (anterior) Wrist <1 day                  Telemetry:  Telemetry Orders (From admission, onward)             48 Hour Telemetry Monitoring  Continuous x 48 hours        References:    Telemetry Guidelines   Question:  Reason for 48 Hour Telemetry  Answer:  Acute Decompensated CHF (continuous diuretic infusion or total diuretic dose > 200 mg daily, associated electrolyte derangement, ionotropic drip, history of ventricular arrhythmia, or new EF <35%)                 Telemetry Reviewed: {ABNER Tele Review:23493}  Indication for Continued Telemetry Use: {Tele Indications:29651}             Imaging: {Radiology Review:10654}    Recent Cultures (last 7 days):         Last 24 Hours Medication List:   Current Facility-Administered Medications   Medication Dose Route Frequency Provider Last Rate    acetaminophen  650 mg Oral Q6H PRN Orcleopatra Lobato PA-C      aspirin  81 mg Oral Daily Solange VecHENRIQUE norris      bumetanide  1 mg Intravenous Q12H Donzell Raisin, PA-C      diphenhydrAMINE  25 mg Intravenous Q6H PRN Donzell Raisin, PA-C      heparin (porcine)  5,000 Units Subcutaneous Q8H [address] Solange Vecellio, PA-C      hydrocortisone   Topical 4x Daily PRN Donzell Raisin, PA-C      ipratropium  2 spray Nasal Q12H Donzell Raisin, PA-C      metoprolol tartrate  25 mg Oral BID Donzell Raisin, PA-C      ondansetron  4 mg Intravenous Q6H PRN Donzell Raisin, PA-C      traMADol  50 mg Oral Q6H PRN Donzell Raisin, PA-C          Today, Patient Was Seen By: Chace Pires    **Please Note: This note may have been constructed using a voice recognition system  **

## 2022-09-29 NOTE — H&P
Teresa U  66   H&P- Angelina Mckeon 1945, 68 y o  female MRN: [id number]  Unit/Bed#: [address] [place] [address] Encounter: [id number]  Primary Care Provider: Era Garcia MD   Date and time admitted to hospital: 9/28/2022  5:56 PM    * Acute exacerbation of CHF (congestive heart failure) ([facility] )  Assessment & Plan  Wt Readings from Last 3 Encounters:   09/29/22 93 8 kg (206 lb 12 7 oz)   09/02/22 89 4 kg (197 lb)   03/11/22 86 2 kg (190 lb)     Pt with shortness of breath, leg edema  · Was previously on torsemide 20mg but stopped secondary to drug reaction, has been on ethacrynic acid for 5 days with no improvement   Echo shows EF 65%, normal diastolic and systolic function, moderate tricuspid regurg   BNP 10,385   Troponin trend negative   Start bumex 1g bid   Daily weights, I/Os   Fluid, sodium restriction          Rash and nonspecific skin eruption  Assessment & Plan  Patient with rash over body that has been presents for a few weeks  · Seen by dermatologist and told she has sulfa allergy, reaction to torsemide  · Hydrocortisone cream, benadryl prn    Infestation by bed bug  Assessment & Plan  Decontaminated in ED    Dementia with behavioral disturbance, unspecified dementia type ([place] [address] )  Assessment & Plan  Pt lives alone, daughter concerned she is unable to care for herself  · No longer on medication  · CM consult    Stage 3b chronic kidney disease [facility])  Assessment & Plan  Lab Results   Component Value Date    EGFR 27 09/28/2022    EGFR 23 02/26/2022    EGFR 33 12/04/2021    CREATININE 1 76 (H) 09/28/2022    CREATININE 2 01 (H) 02/26/2022    CREATININE 1 52 (H) 12/04/2021     Creatinine at baseline, monitor with BMP    Coronary artery disease involving native coronary artery of native heart without angina pectoris  Assessment & Plan  S/p PCI in 2012 with NARGIS to RCA, 2013 NARGIS to LAD  · No chest pain  · Troponin trend negative  · Continue metoprolol    Essential hypertension  Assessment & Plan  Continue metoprolol 25mg bid    VTE Pharmacologic Prophylaxis: VTE Score: 4 Moderate Risk (Score 3-4) - Pharmacological DVT Prophylaxis Ordered: heparin  Code Status: Level 1 - Full Code   Discussion with family: Updated  (daughter) at bedside  Anticipated Length of Stay: Patient will be admitted on an observation basis with an anticipated length of stay of less than 2 midnights secondary to CHF, placement, rash  Total Time for Visit, including Counseling / Coordination of Care: 45 minutes Greater than 50% of this total time spent on direct patient counseling and coordination of care  Chief Complaint: shortness of breath    History of Present Illness:  Sis Lopez is a 68 y o  female with a PMH of dementia, hyperlipidemia, hypertension, CKD who presents with shortness of breath  Patient is poor historian secondary to dementia, history obtained from daughter  Patient has been complaining of shortness of breath with exertion, swelling and pain of her legs  Also with itching rash present over body for the past few weeks  Patient was on torsemide 20mg and seen by dermatologist who thought rash was secondary to sulfa allergy and starting torsemide  This was stopped and patient has been on ethacrynic acid for 5 days with no improvement of symptoms  Pt lives home alone and has not been taking care of herself, mostly just lying in bed unless home aid is around  Pt found to have bed bugs in ED  Daughter lives in [place] and is not able to see daughter as often as she would like, is asking about rehab options  Denies any fevers, chills, chest pain, cough, abdominal pain, constipation/diarrhea, dysuria, hematuria  Review of Systems:  Review of Systems   Constitutional: Negative for chills and fever  Eyes: Negative for pain and visual disturbance  Respiratory: Positive for shortness of breath  Negative for cough  Cardiovascular: Positive for leg swelling   Negative for chest pain and palpitations  Gastrointestinal: Negative for abdominal pain and vomiting  Genitourinary: Negative for dysuria and hematuria  Musculoskeletal: Negative for arthralgias and back pain  Skin: Negative for color change and rash  Neurological: Negative for dizziness and headaches  All other systems reviewed and are negative  Past Medical and Surgical History:   Past Medical History:   Diagnosis Date    Arthritis     [facility]estive [facility] failure) [facility] )     COPD [facility]ch[facility] obstructive pulmonary disease) [facility]     Coronary artery disease     Hypertension        History reviewed  No pertinent surgical history  Meds/Allergies:  Prior to Admission medications    Medication Sig Start Date End Date Taking?  Authorizing Provider   aspirin 81 mg chewable tablet Chew 81 mg daily    Historical Provider, MD   ethacrynic acid (EDECRIN) 25 mg tablet Take 25 mg by mouth daily    Historical Provider, MD   ipratropium (ATROVENT) 0 03 % nasal spray 2 sprays into each nostril every 12 (twelve) hours 9/2/22   Maritza Del Rio MD   metoprolol tartrate (LOPRESSOR) 25 mg tablet Take 25 mg by mouth 2 (two) times a day 1/7/20   Historical Provider, MD   atorvastatin (LIPITOR) 10 mg tablet Take 10 mg by mouth daily  Patient not taking: Reported on 9/2/2022 9/28/22  Historical Provider, MD   cholecalciferol (VITAMIN D3) 1,000 units tablet Take 1,000 Units by mouth daily  Patient not taking: Reported on 9/28/2022 9/28/22  Historical Provider, MD   clobetasol (TEMOVATE) 0 05 % cream Apply topically 2 (two) times a day  Patient not taking: Reported on 9/2/2022 6/7/22 9/28/22  Historical Provider, MD   donepezil (ARICEPT) 5 mg tablet Take 5 mg by mouth daily at bedtime  Patient not taking: Reported on 9/28/2022 9/28/22  Historical Provider, MD   fexofenadine (ALLEGRA) 180 MG tablet Take 180 mg by mouth daily  Patient not taking: Reported on 9/2/2022 9/28/22  Historical Provider, MD   lisinopril (ZESTRIL) 5 mg tablet Take by mouth  Patient not taking: Reported on 9/2/2022 9/28/22  Historical Provider, MD   Potassium 99 MG TABS Take 99 mg by mouth  Patient not taking: Reported on 9/2/2022 9/28/22  Historical Provider, MD   torsemide (DEMADEX) 20 mg tablet Take 20 mg by mouth daily  Patient not taking: Reported on 9/28/2022 2/24/22 9/28/22  Historical Provider, MD     I have reviewed home medications with patient personally  Allergies: Allergies   Allergen Reactions    Bee Pollen Allergic Rhinitis     Runny nose    Pollen Extract      Runny nose    Torsemide Rash       Social History:  Marital Status:    Occupation:   Patient Pre-hospital Living Situation: Home  Patient Pre-hospital Level of Mobility: walks  Patient Pre-hospital Diet Restrictions:   Substance Use History:   Social History     Substance and Sexual Activity   Alcohol Use Never     Social History     Tobacco Use   Smoking Status Former Smoker    Types: Cigarettes   Smokeless Tobacco Never Used     Social History     Substance and Sexual Activity   Drug Use Never       Family History:  History reviewed  No pertinent family history  Physical Exam:     Vitals:   Blood Pressure: 154/67 (09/29/22 0002)  Pulse: 90 (09/29/22 0002)  Temperature: 98 7 °F (37 1 °C) (09/29/22 0002)  Temp Source: Axillary (09/29/22 0002)  Respirations: 20 (09/29/22 0002)  Height: 5' 7" (170 2 cm) (09/28/22 1802)  Weight - Scale: 93 8 kg (206 lb 12 7 oz) (09/29/22 0002)  SpO2: 94 % (09/29/22 0002)    Physical Exam  Vitals and nursing note reviewed  Constitutional:       General: She is not in acute distress  Appearance: She is well-developed  HENT:      Head: Normocephalic and atraumatic  Eyes:      Conjunctiva/sclera: Conjunctivae normal    Cardiovascular:      Rate and Rhythm: Normal rate and regular rhythm  Heart sounds: No murmur heard  Pulmonary:      Effort: Pulmonary effort is normal  No respiratory distress  Breath sounds: Normal breath sounds  Abdominal:      Palpations: Abdomen is soft  Tenderness: There is no abdominal tenderness  Musculoskeletal:      Cervical back: Neck supple  Right lower leg: Edema present  Left lower leg: Edema present  Skin:     General: Skin is warm and dry  Neurological:      Mental Status: She is alert  Mental status is at baseline  Additional Data:     Lab Results:  Results from last 7 days   Lab Units 09/28/22  1848   WBC Thousand/uL 6 40   HEMOGLOBIN g/dL 12 4   HEMATOCRIT % 40 0   PLATELETS Thousands/uL 177   NEUTROS PCT % 66   LYMPHS PCT % 19   MONOS PCT % 10   EOS PCT % 3     Results from last 7 days   Lab Units 09/28/22  1848   SODIUM mmol/L 142   POTASSIUM mmol/L 4 4   CHLORIDE mmol/L 106   CO2 mmol/L 28   BUN mg/dL 21   CREATININE mg/dL 1 76*   ANION GAP mmol/L 8   CALCIUM mg/dL 8 7   ALBUMIN g/dL 3 4*   TOTAL BILIRUBIN mg/dL 0 61   ALK PHOS U/L 86   ALT U/L 16   AST U/L 19   GLUCOSE RANDOM mg/dL 88                       Imaging: Personally reviewed the following imaging: chest xray  XR chest 1 view portable   ED Interpretation by Lyudmila Ferris DO (09/28 2048)   Pulm vasc congestion      VAS lower limb venous duplex study, complete bilateral    (Results Pending)   XR knee 4+ vw right injury    (Results Pending)       EKG and Other Studies Reviewed on Admission:   · EKG: NSR  HR 80     ** Please Note: This note has been constructed using a voice recognition system   **

## 2022-09-29 NOTE — ASSESSMENT & PLAN NOTE
S/p PCI in 2012 with NARGIS to RCA, 2013 NARGIS to LAD  · No chest pain  · Troponin trend negative  · Continue metoprolol

## 2022-09-29 NOTE — ASSESSMENT & PLAN NOTE
Pt lives alone, daughter concerned she is unable to care for herself  · No longer on medication  · CM consult for long-term placement  · Prn geodon for agitation

## 2022-09-29 NOTE — ASSESSMENT & PLAN NOTE
Asymptomatic,  S/p PCI in 2012 with NARGIS to RCA, 2013 NARGIS to LAD    · Troponin trend negative  · Continue metoprolol

## 2022-09-29 NOTE — ASSESSMENT & PLAN NOTE
Pt lives alone, daughter concerned she is unable to care for herself  · No longer on medication  · CM consult

## 2022-09-29 NOTE — PLAN OF CARE
Problem: MOBILITY - ADULT  Goal: Maintain or return to baseline ADL function  Description: INTERVENTIONS:  -  Assess patient's ability to carry out ADLs; assess patient's baseline for ADL function and identify physical deficits which impact ability to perform ADLs (bathing, care of mouth/teeth, toileting, grooming, dressing, etc )  - Assess/evaluate cause of self-care deficits   - Assess range of motion  - Assess patient's mobility; develop plan if impaired  - Assess patient's need for assistive devices and provide as appropriate  - Encourage maximum independence but intervene and supervise when necessary  - Involve family in performance of ADLs  - Assess for home care needs following discharge   - Consider OT consult to assist with ADL evaluation and planning for discharge  - Provide patient education as appropriate  Outcome: Progressing  Goal: Maintains/Returns to pre admission functional level  Description: INTERVENTIONS:  - Perform BMAT or MOVE assessment daily    - Set and communicate daily mobility goal to care team and patient/family/caregiver     - Collaborate with rehabilitation services on mobility goals if consulted  - Perform Range of Motion 3 times a day  - Dangle patient 3 times a day  - Stand patient 3  times a day  - Ambulate patient 3  times a day  - Out of bed to chair 3 times a day   - Out of bed for meals 3   Problem: PAIN - ADULT  Goal: Verbalizes/displays adequate comfort level or baseline comfort level  Description: Interventions:  - Encourage patient to monitor pain and request assistance  - Assess pain using appropriate pain scale  - Administer analgesics based on type and severity of pain and evaluate response  - Implement non-pharmacological measures as appropriate and evaluate response  - Consider cultural and social influences on pain and pain management  - Notify physician/advanced practitioner if interventions unsuccessful or patient reports new pain  Outcome: Progressing     Problem: SAFETY ADULT  Goal: Maintain or return to baseline ADL function  Description: INTERVENTIONS:  -  Assess patient's ability to carry out ADLs; assess patient's baseline for ADL function and identify physical deficits which impact ability to perform ADLs (bathing, care of mouth/teeth, toileting, grooming, dressing, etc )  - Assess/evaluate cause of self-care deficits   - Assess range of motion  - Assess patient's mobility; develop plan if impaired  - Assess patient's need for assistive devices and provide as appropriate  - Encourage maximum independence but intervene and supervise when necessary  - Involve family in performance of ADLs  - Assess for home care needs following discharge   - Consider OT consult to assist with ADL evaluation and planning for discharge  - Provide patient education as appropriate  Outcome: Progressing  Goal: Patient will remain free of falls  Description: INTERVENTIONS:  - Educate patient/family on patient safety including physical limitations  - Instruct patient to call for assistance with activity   - Consult OT/PT to assist with strengthening/mobility   - Keep Call bell within reach  - Keep bed low and locked with side rails adjusted as appropriate  - Keep care items and personal belongings within reach  - Initiate and maintain comfort rounds  - Make Fall Risk Sign visible to staff  - Offer Toileting every 2 Hours, in advance of need  - Initiate/Maintain bed alarm    - Apply yellow socks and bracelet for high fall risk patients  Problem: DISCHARGE PLANNING  Goal: Discharge to home or other facility with appropriate resources  Description: INTERVENTIONS:  - Identify barriers to discharge w/patient and caregiver  - Arrange for needed discharge resources and transportation as appropriate  - Identify discharge learning needs (meds, wound care, etc )  - Arrange for interpretive services to assist at discharge as needed  - Refer to Case Management Department for coordinating discharge planning if the patient needs post-hospital services based on physician/advanced practitioner order or complex needs related to functional status, cognitive ability, or social support system  Outcome: Progressing     Problem: CARDIOVASCULAR - ADULT  Goal: Maintains optimal cardiac output and hemodynamic stability  Description: INTERVENTIONS:  - Monitor I/O, vital signs and rhythm  - Monitor for S/S and trends of decreased cardiac output  - Administer and titrate ordered vasoactive medications to optimize hemodynamic stability  - Assess quality of pulses, skin color and temperature  - Assess for signs of decreased coronary artery perfusion  - Instruct patient to report change in severity of symptoms  Outcome: Progressing  Goal: Absence of cardiac dysrhythmias or at baseline rhythm  Description: INTERVENTIONS:  - Continuous cardiac monitoring, vital signs, obtain 12 lead EKG if ordered  - Administer antiarrhythmic and heart rate control medications as ordered  - Monitor electrolytes and administer replacement therapy as ordered  Outcome: Progressing     Problem: SKIN/TISSUE INTEGRITY - ADULT  Goal: Skin Integrity remains intact(Skin Breakdown Prevention)  Description: Assess:  -Perform Judah assessment every shift  -Clean and moisturize skin every 4 hours   -Inspect skin when repositioning, toileting, and assisting with ADLS  -Assess extremities for adequate circulation and sensation     Bed Management:  -Have minimal linens on bed & keep smooth, unwrinkled  -Change linens as needed when moist or perspiring  -Avoid sitting or lying in one position for more than 2 hours while in bed  -Keep HOB at 30 degrees     Toileting:  -Offer bedside commode  -Assess for incontinence every 2 hours    -Use incontinent care products after each incontinent episode such as blue cream     Activity:  -Mobilize patient 3  times a day  -Encourage activity and walks on unit  -Encourage or provide ROM exercises   -Turn and reposition patient every 2 Hours  -Use appropriate equipment to lift or move patient in bed  -Instruct/ Assist with weight shifting every 15 minutes when out of bed in chair    Skin Care:  -Avoid use of baby powder, tape, friction and shearing, hot water or constrictive clothing  -Relieve pressure over bony prominences using air cushion  -Do not massage red bony areas  Problem: MUSCULOSKELETAL - ADULT  Goal: Maintain or return mobility to safest level of function  Description: INTERVENTIONS:  - Assess patient's ability to carry out ADLs; assess patient's baseline for ADL function and identify physical deficits which impact ability to perform ADLs (bathing, care of mouth/teeth, toileting, grooming, dressing, etc )  - Assess/evaluate cause of self-care deficits   - Assess range of motion  - Assess patient's mobility  - Assess patient's need for assistive devices and provide as appropriate  - Encourage maximum independence but intervene and supervise when necessary  - Involve family in performance of ADLs  - Assess for home care needs following discharge   - Consider OT consult to assist with ADL evaluation and planning for discharge  - Provide patient education as appropriate  Outcome: Progressing        - Consider moving patient to room near nurses station  Outcome: Progressing   times a day  - Out of bed for toileting  - Record patient progress and toleration of activity level   Outcome: Progressing

## 2022-09-29 NOTE — PLAN OF CARE
Problem: MOBILITY - ADULT  Goal: Maintain or return to baseline ADL function  Description: INTERVENTIONS:  -  Assess patient's ability to carry out ADLs; assess patient's baseline for ADL function and identify physical deficits which impact ability to perform ADLs (bathing, care of mouth/teeth, toileting, grooming, dressing, etc )  - Assess/evaluate cause of self-care deficits   - Assess range of motion  - Assess patient's mobility; develop plan if impaired  - Assess patient's need for assistive devices and provide as appropriate  - Encourage maximum independence but intervene and supervise when necessary  - Involve family in performance of ADLs  - Assess for home care needs following discharge   - Consider OT consult to assist with ADL evaluation and planning for discharge  - Provide patient education as appropriate  Outcome: Progressing     Problem: PAIN - ADULT  Goal: Verbalizes/displays adequate comfort level or baseline comfort level  Description: Interventions:  - Encourage patient to monitor pain and request assistance  - Assess pain using appropriate pain scale  - Administer analgesics based on type and severity of pain and evaluate response  - Implement non-pharmacological measures as appropriate and evaluate response  - Consider cultural and social influences on pain and pain management  - Notify physician/advanced practitioner if interventions unsuccessful or patient reports new pain  Outcome: Progressing     Problem: CARDIOVASCULAR - ADULT  Goal: Maintains optimal cardiac output and hemodynamic stability  Description: INTERVENTIONS:  - Monitor I/O, vital signs and rhythm  - Monitor for S/S and trends of decreased cardiac output  - Administer and titrate ordered vasoactive medications to optimize hemodynamic stability  - Assess quality of pulses, skin color and temperature  - Assess for signs of decreased coronary artery perfusion  - Instruct patient to report change in severity of symptoms  Outcome: Progressing     Problem: CARDIOVASCULAR - ADULT  Goal: Absence of cardiac dysrhythmias or at baseline rhythm  Description: INTERVENTIONS:  - Continuous cardiac monitoring, vital signs, obtain 12 lead EKG if ordered  - Administer antiarrhythmic and heart rate control medications as ordered  - Monitor electrolytes and administer replacement therapy as ordered  Outcome: Progressing     Problem: Potential for Falls  Goal: Patient will remain free of falls  Description: INTERVENTIONS:  -  Assess patient's ability to carry out ADLs; assess patient's baseline for ADL function and identify physical deficits which impact ability to perform ADLs (bathing, care of mouth/teeth, toileting, grooming, dressing, etc )  - Assess/evaluate cause of self-care deficits   - Assess range of motion  - Assess patient's mobility; develop plan if impaired  - Assess patient's need for assistive devices and provide as appropriate  - Encourage maximum independence but intervene and supervise when necessary  - Involve family in performance of ADLs  - Assess for home care needs following discharge   - Consider OT consult to assist with ADL evaluation and planning for discharge  - Provide patient education as appropriate  Outcome: Progressing

## 2022-09-29 NOTE — PHYSICAL THERAPY NOTE
PHYSICAL THERAPY EVALUATION/TREATMENT     09/29/22 0950   PT Last Visit   PT Visit Date 09/29/22   Note Type   Note type Evaluation   Pain Assessment   Pain Assessment Tool 0-10   Pain Score No Pain   Restrictions/Precautions   Weight Bearing Precautions Per Order No   Other Precautions Contact/isolation;Cognitive; Bed Alarm;Telemetry; Fall Risk   Home Living   Type of [address] Two level; Able to live on main level with bedroom/bathroom   Bathroom Shower/Tub Tub/shower unit   Prior Function   Lives With Alone   Receives Help From Family;Friend(s)   ADL Assistance Independent   IADLs Needs assistance   Comments Pt's friend Kwame Sung came at end of PT session  She states that pt lives alone   Kwame Sung is her best friend since they were younger and comes to spend time with the pt for several hours each day  She will help her get a shower 1 or 2x/wk  Pt does not sit to shower  Pt has one step to get to her kitchen with no handrails to help her  Daughter is looking for a RW to assist with that step down to kitchen  Pt does cook for herself and walks without an AD  General   Additional Pertinent History Admitted with leg swelling, , acute CHF  Family/Caregiver Present No   Cognition   Overall Cognitive Status Impaired   Arousal/Participation Cooperative   Orientation Level Oriented to person;Disoriented to place; Disoriented to time;Disoriented to situation   Following Commands Follows one step commands inconsistently   Comments needs verbal cues for all activities, little initiation  RLE Assessment   RLE Assessment WFL  (strength at least 3+/5)   LLE Assessment   LLE Assessment WFL  (strength at least 3+/5)   Bed Mobility   Supine to Sit 3  Moderate assistance   Additional items Assist x 1;Verbal cues; Increased time required;LE management   Sit to Supine 3  Moderate assistance   Additional items Assist x 1;Verbal cues;LE management; Increased time required   Additional Comments in supine and increased verbal cues, pt was able to move herself up higher in the bed  Transfers   Sit to Stand 4  Minimal assistance   Additional items Assist x 1;Verbal cues   Stand to Sit 4  Minimal assistance   Additional items Assist x 1;Verbal cues   Ambulation/Elevation   Gait pattern Foward flexed  (slow will)   Gait Assistance 4  Minimal assist   Additional items Assist x 1;Verbal cues   Assistive Device Rolling walker   Distance 5 feet   Stair Management Assistance Not tested   Balance   Static Sitting Good   Dynamic Sitting Fair +   Static Standing Fair   Dynamic Standing Fair -   Ambulatory Fair -  (with RW)   Activity Tolerance   Activity Tolerance   (limited by mentation and decreased initiation)   Medical Staff Made Aware yes MD during rounds   Nurse Made Aware yes   Assessment   Prognosis Good   Problem List Decreased strength;Decreased endurance; Impaired balance;Decreased mobility; Decreased cognition; Impaired judgement;Decreased safety awareness   Assessment Patient seen for Physical Therapy evaluation  Patient admitted with Acute exacerbation of CHF (congestive heart failure) ([facility] [address] )  Comorbidities affecting patient's physical performance include: dementia, HLD, HTN, CKD,CHF, COPD, CAD  [place] Personal factors affecting patient at time of initial evaluation include: lives in two story house, stairs to enter home, inability to ambulate household distances, inability to navigate level surfaces without external assistance, decreased cognition, limited home support, decreased initiation and engagement, limited insight into impairments, inability to perform ADLS, inability to perform IADLS  and inability to live alone   Prior to admission, patient was independent with functional mobility without assistive device, requiring assist for ADLS, requiring assist for IADLS, living alone in two story home with 1 steps to enter, ambulating household distance, home with family assist and lives in a multilevel house but has 1st floor setup  Please find objective findings from Physical Therapy assessment regarding body systems outlined above with impairments and limitations including weakness, impaired balance, decreased endurance, impaired coordination, gait deviations, decreased activity tolerance, decreased functional mobility tolerance, decreased safety awareness, impaired judgement, fall risk, decreased skin integrity and decreased cognition  The Barthel Index was used as a functional outcome tool presenting with a score of Barthel Index Score: 35 today indicating marked limitations of functional mobility and ADLS  Patient's clinical presentation is currently unstable/unpredictable as seen in patient's presentation of varying levels of cognitive performance, increased fall risk, new onset of impairment of functional mobility, decreased endurance and new onset of weakness  Pt would benefit from continued Physical Therapy treatment to address deficits as defined above and maximize level of functional mobility  As demonstrated by objective findings, the assigned level of complexity for this evaluation is high  The patient's -[facility] Basic Mobility Inpatient Short Form Raw Score is 14  A Raw score of less than or equal to 16 suggests the patient may benefit from discharge to post-acute rehabilitation services  Please also refer to the recommendation of the Physical Therapist for safe discharge planning  Goals   Patient Goals none stated due to mentation   STG Expiration Date 10/06/22   Short Term Goal #1 Indep transfers supine <> short sit and sit <> stand with use of RW   Short Term Goal #2 Supervision for amb  with RW for functional household distances  LTG Expiration Date 10/13/22   Long Term Goal #1 Indep amb  without AD for functional household distances with good balance   Plan   Treatment/Interventions ADL retraining;Functional transfer training;LE strengthening/ROM; Therapeutic exercise; Endurance training;Cognitive reorientation;Patient/family training;Equipment eval/education; Bed mobility;Gait training   PT Frequency Other (Comment)  (5/wk)   Recommendation   PT Discharge Recommendation Post acute rehabilitation services   Equipment Recommended [facility] Recommended Wheeled walker   Additional Comments if pt is d/c to STR, they can issue RW  (unless daughter is able to locate one )   [facility] [address] [place] Mobility Inpatient   Turning in Bed Without Bedrails 2   Lying on Back to Sitting on Edge of Flat Bed 2   Moving Bed to Chair 2   Standing Up From Chair 3   Walk in Room 3   Climb 3-5 Stairs 2   Basic Mobility Inpatient Raw Score 14   Basic Mobility Standardized Score 35 55   Highest Level Of Mobility   -[facility] Goal 4: Move to chair/commode   -HL Achieved 7: Walk 25 feet or more   Barthel Index   Feeding 5   Bathing 0   Grooming Score 0   Dressing Score 5   Bladder Score 0   Bowels Score 5   Toilet Use Score 5   Transfers (Bed/Chair) Score 10   Mobility (Level Surface) Score 0   Stairs Score 5   Barthel Index Score 35   Additional Treatment Session   Start Time 0940   End Time 0950   Treatment Assessment Pt is a poor historian and limited orientation  Pt does not initiate mobility upon command  Once standing at RW, pt was able to walk with Mod A with a slow will  Needed assist for navigating the RW around room  Pt back in bed at end of session with all needs in reach  REviewed the use of call bell  Pt's friend Angelia Maynard came to visit with pt at end of PT session  Pt is unsafe at home due to mentation  Pt would benefit from rehab to contine to work on pt's safety awareness and abilities  Reocommend STR  Equipment Use rolling walker   Exercises   Heelslides Supine;10 reps;AAROM; Bilateral   Ankle Pumps Supine;10 reps;AAROM; Bilateral   Balance training  with use of RW: pt does not use AD per Angelia Maynard  End of Consult   Patient Position at End of Consult Supine;Bed/Chair alarm activated; All needs within reach Licensure   [place] License Number  [facility] PT  [id number]

## 2022-09-29 NOTE — ASSESSMENT & PLAN NOTE
Wt Readings from Last 3 Encounters:   09/29/22 93 8 kg (206 lb 12 7 oz)   09/02/22 89 4 kg (197 lb)   03/11/22 86 2 kg (190 lb)     Acute CHF likely Diastolic in the setting of Hypertension evidenced by BNP 10,385, SOB and leg edema, treated with IV Bumex BID, daily weights, I&Os and fluid restriction      · Was previously on torsemide 20mg but stopped secondary to drug reaction, has been on ethacrynic acid for 5 days with no improvement, currently on Bumex   Last Echo shows EF 94%, normal diastolic and systolic function, moderate tricuspid regurg, repeat pending

## 2022-09-29 NOTE — ASSESSMENT & PLAN NOTE
Lab Results   Component Value Date    EGFR 28 09/29/2022    EGFR 27 09/28/2022    EGFR 23 02/26/2022    CREATININE 1 73 (H) 09/29/2022    CREATININE 1 76 (H) 09/28/2022    CREATININE 2 01 (H) 02/26/2022     Creatinine at baseline, monitor with BMP  Avoid nephrotoxic

## 2022-09-29 NOTE — ED PROVIDER NOTES
History  Chief Complaint   Patient presents with    Leg Swelling     Pt arrived EMS from home  Per pt daughter states pt recently had diuretics changed and pt still has bilateral leg swelling with leg weakness  Pt c/o right inner thigh pain with SOB  Hx of CHF, COPD, and dementia  Pt Spo2 97% on room air  Patient presents to the emergency department with her daughter with complaint of increasing bilateral lower extremity edema and leg weakness  Patient also complained of bilateral thigh pain  Patient was recently evaluated at this emergency department, diagnosed with CHF and prescribed Lasix  Patient's daughter states that she developed a rash shortly afterwards, and they were informed by Dermatology that the rash was secondary to sulfite allergy  Patient discontinued Lasix for approximately 1 week and was recently started on Ethacrynic acid  Patient was also recently diagnosed with dementia and is a poor historian  Patient's daughter states that she lives alone, but has a caregiver that comes in for couple of hours daily  Pt is responsible for taking her medications that are dispensed from a time release pill box  She has a history of COPD, CAD, hypertension, CHF, arthritis      History provided by:  Patient   used: No        Prior to Admission Medications   Prescriptions Last Dose Informant Patient Reported?  Taking?   aspirin 81 mg chewable tablet Unknown at Unknown time  Yes No   Sig: Chew 81 mg daily   ethacrynic acid (EDECRIN) 25 mg tablet Unknown at Unknown time  Yes No   Sig: Take 25 mg by mouth daily   ipratropium (ATROVENT) 0 03 % nasal spray Unknown at Unknown time  No No   Si sprays into each nostril every 12 (twelve) hours   metoprolol tartrate (LOPRESSOR) 25 mg tablet Unknown at Unknown time  Yes No   Sig: Take 25 mg by mouth 2 (two) times a day      Facility-Administered Medications: None       Past Medical History:   Diagnosis Date    Arthritis     CHF (congestive heart failure) ([facility] )     COPD (chronic obstructive pulmonary disease) ([facility] )     Coronary artery disease     Hypertension        History reviewed  No pertinent surgical history  History reviewed  No pertinent family history  I have reviewed and agree with the history as documented  E-Cigarette/Vaping    E-Cigarette Use Never User      E-Cigarette/Vaping Substances     Social History     Tobacco Use    Smoking status: Former Smoker     Types: Cigarettes    Smokeless tobacco: Never Used   Vaping Use    Vaping Use: Never used   Substance Use Topics    Alcohol use: Never    Drug use: Never       Review of Systems   Constitutional: Negative for chills and fever  Respiratory: Negative for cough, chest tightness and shortness of breath  Gastrointestinal: Negative for abdominal pain, diarrhea, nausea and vomiting  Genitourinary: Negative for dysuria, frequency, hematuria and urgency  Musculoskeletal: Positive for gait problem  Negative for back pain, neck pain and neck stiffness  All other systems reviewed and are negative  Physical Exam  Physical Exam  Vitals and nursing note reviewed  Constitutional:       General: She is not in acute distress  Appearance: She is well-developed  She is not diaphoretic  HENT:      Head: Normocephalic and atraumatic  Eyes:      Conjunctiva/sclera: Conjunctivae normal       Pupils: Pupils are equal, round, and reactive to light  Cardiovascular:      Rate and Rhythm: Normal rate and regular rhythm  Heart sounds: Normal heart sounds  No murmur heard  Pulmonary:      Effort: Pulmonary effort is normal  No respiratory distress  Breath sounds: Normal breath sounds  Abdominal:      General: Bowel sounds are normal  There is no distension  Palpations: Abdomen is soft  Tenderness: There is no abdominal tenderness  Musculoskeletal:         General: No deformity  Normal range of motion        Cervical back: Normal range of motion and neck supple  Right lower leg: Edema present  Left lower leg: Edema present  Skin:     General: Skin is warm and dry  Capillary Refill: Capillary refill takes less than 2 seconds  Coloration: Skin is not pale  Findings: No rash  Neurological:      General: No focal deficit present  Mental Status: She is alert  She is disoriented  Cranial Nerves: No cranial nerve deficit     Psychiatric:         Behavior: Behavior normal          Vital Signs  ED Triage Vitals [09/28/22 1802]   Temperature Pulse Respirations Blood Pressure SpO2   97 8 °F (36 6 °C) 80 18 117/73 96 %      Temp Source Heart Rate Source Patient Position - Orthostatic VS BP Location FiO2 (%)   Oral Monitor Sitting Left arm --      Pain Score       10 - Worst Possible Pain           Vitals:    10/03/22 0700 10/03/22 1521 10/03/22 1833 10/03/22 2210   BP: 141/83 121/71 131/75 130/72   Pulse: 89 75 70 66   Patient Position - Orthostatic VS: Lying            Visual Acuity  Visual Acuity    Flowsheet Row Most Recent Value   L Pupil Size (mm) 2   R Pupil Size (mm) 2   L Pupil Shape Round   R Pupil Shape Round          ED Medications  Medications   aspirin chewable tablet 81 mg (81 mg Oral Given 10/3/22 0953)   ipratropium (ATROVENT) 0 03 % nasal spray 2 spray (2 sprays Nasal Refused 10/4/22 0107)   traMADol (ULTRAM) tablet 50 mg (50 mg Oral Given 10/3/22 1430)   acetaminophen (TYLENOL) tablet 650 mg (has no administration in time range)   ondansetron (ZOFRAN) injection 4 mg (has no administration in time range)   hydrocortisone 1 % ointment (1 application Topical Given 10/2/22 1332)   metoprolol tartrate (LOPRESSOR) tablet 50 mg (50 mg Oral Given 10/3/22 2017)   apixaban (ELIQUIS) tablet 5 mg (5 mg Oral Given 10/3/22 1741)   sodium chloride 0 9 % infusion (75 mL/hr Intravenous New Bag 10/3/22 0953)   pneumococcal 23-valent polysaccharide vaccine (PNEUMOVAX-23) injection 0 5 mL (has no administration in time range)   diltiazem (CARDIZEM) injection 15 mg (15 mg Intravenous Given 9/30/22 1538)   haloperidol lactate (HALDOL) injection 2 mg (2 mg Intramuscular Given 10/1/22 0228)   bumetanide (BUMEX) injection 1 mg (1 mg Intravenous Given 10/1/22 1140)   metoprolol (LOPRESSOR) injection 2 5 mg (2 5 mg Intravenous Given 10/3/22 0013)   influenza vaccine, high-dose (FLUZONE HIGH-DOSE) IM injection ZENON 0 7 mL (0 7 mL Intramuscular Given 10/3/22 1118)   pneumococcal 23-valent polysaccharide vaccine (PNEUMOVAX-23) injection 0 5 mL (0 5 mL Subcutaneous Given 10/3/22 1417)       Diagnostic Studies  Results Reviewed     Procedure Component Value Units Date/Time    HS Troponin I 4hr [[id number]]  (Normal) Collected: 09/28/22 2308    Lab Status: Final result Specimen: Blood from Arm, Right Updated: 09/28/22 2336     hs TnI 4hr 10 ng/L      Delta 4hr hsTnI -1 ng/L     HS Troponin I 2hr [[id number]]  (Normal) Collected: 09/28/22 2034    Lab Status: Final result Specimen: Blood from Arm, Right Updated: 09/28/22 2105     hs TnI 2hr 11 ng/L      Delta 2hr hsTnI 0 ng/L     HS Troponin 0hr (reflex protocol) [[id number]]  (Normal) Collected: 09/28/22 1848    Lab Status: Final result Specimen: Blood from Arm, Right Updated: 09/28/22 1917     hs TnI 0hr 11 ng/L     NT-BNP PRO [[id number]]  (Abnormal) Collected: 09/28/22 1848    Lab Status: Final result Specimen: Blood from Arm, Right Updated: 09/28/22 1915     NT-proBNP 10,385 pg/mL     Comprehensive metabolic panel [[id number]]  (Abnormal) Collected: 09/28/22 1848    Lab Status: Final result Specimen: Blood from Arm, Right Updated: 09/28/22 1909     Sodium 142 mmol/L      Potassium 4 4 mmol/L      Chloride 106 mmol/L      CO2 28 mmol/L      ANION GAP 8 mmol/L      BUN 21 mg/dL      Creatinine 1 76 mg/dL      Glucose 88 mg/dL      Calcium 8 7 mg/dL      Corrected Calcium 9 2 mg/dL      AST 19 U/L      ALT 16 U/L      Alkaline Phosphatase 86 U/L      Total Protein 7 5 g/dL      Albumin 3 4 g/dL      Total Bilirubin 0 61 mg/dL      eGFR 27 ml/min/1 73sq m     Narrative:      National Kidney Disease Foundation guidelines for Chronic Kidney Disease (CKD):     Stage 1 with normal or high GFR (GFR > 90 mL/min/1 73 square meters)    Stage 2 Mild CKD (GFR = 60-89 mL/min/1 73 square meters)    Stage 3A Moderate CKD (GFR = 45-59 mL/min/1 73 square meters)    Stage 3B Moderate CKD (GFR = 30-44 mL/min/1 73 square meters)    Stage 4 Severe CKD (GFR = 15-29 mL/min/1 73 square meters)    Stage 5 End Stage CKD (GFR <15 mL/min/1 73 square meters)  Note: GFR calculation is accurate only with a steady state creatinine    CBC and differential [[id number]]  (Abnormal) Collected: 09/28/22 1848    Lab Status: Final result Specimen: Blood from Arm, Right Updated: 09/28/22 1854     WBC 6 40 Thousand/uL      RBC 3 99 Million/uL      Hemoglobin 12 4 g/dL      Hematocrit 40 0 %       fL      MCH 31 1 pg      MCHC 31 0 g/dL      RDW 16 3 %      MPV 10 9 fL      Platelets 712 Thousands/uL      nRBC 0 /100 WBCs      Neutrophils Relative 66 %      Immat GRANS % 1 %      Lymphocytes Relative 19 %      Monocytes Relative 10 %      Eosinophils Relative 3 %      Basophils Relative 1 %      Neutrophils Absolute 4 27 Thousands/µL      Immature Grans Absolute 0 03 Thousand/uL      Lymphocytes Absolute 1 22 Thousands/µL      Monocytes Absolute 0 62 Thousand/µL      Eosinophils Absolute 0 20 Thousand/µL      Basophils Absolute 0 06 Thousands/µL                  VAS lower limb venous duplex study, complete bilateral   Final Result by Elen Rivas DO (09/29 1423)      XR knee 4+ vw right injury   Final Result by Shawnee Cummings MD (09/29 9576)      No acute osseous abnormality  Mild osteoarthritis  Mild prepatellar soft tissue swelling        Workstation performed: [id number]         XR chest 1 view portable   ED Interpretation by Cy Hyde DO (09/28 2048)   Pulm vasc congestion      Final Result by Shawnee Cummings MD (09/29 7180)      No active pulmonary disease  Workstation performed: [id number]                    Procedures  ECG 12 Lead Documentation Only    Date/Time: 9/28/2022 6:35 PM  Performed by: Rhonda Love DO  Authorized by: Rhonda Love DO     Indications / Diagnosis:  Sob  ECG reviewed by me, the ED Provider: yes    Patient location:  ED  Previous ECG:     Previous ECG:  Compared to current    Similarity:  No change    Comparison to cardiac monitor: Yes    Interpretation:     Interpretation: abnormal    Rate:     ECG rate:  79bpm    ECG rate assessment: normal    Rhythm:     Rhythm: sinus rhythm    Ectopy:     Ectopy: PAC    QRS:     QRS axis:  Normal  Conduction:     Conduction: normal    ST segments:     ST segments:  Normal  T waves:     T waves: inverted               ED Course                                             MDM  Number of Diagnoses or Management Options  Acute exacerbation of CHF (congestive heart failure) ([place] [address] ): new and requires workup  Infestation by bed bug: new and requires workup  Diagnosis management comments: Prelim vasc report - neg for DVT    Pt's daughter concerned about her care and requesting evaluation for placement          Amount and/or Complexity of Data Reviewed  Clinical lab tests: ordered and reviewed  Tests in the radiology section of CPT®: ordered and reviewed    Risk of Complications, Morbidity, and/or Mortality  Presenting problems: high  Diagnostic procedures: high  Management options: high    Patient Progress  Patient progress: stable      Disposition  Final diagnoses:   Acute exacerbation of CHF (congestive heart failure) ([place] [address] )   Infestation by bed bug     Time reflects when diagnosis was documented in both MDM as applicable and the Disposition within this note     Time User Action Codes Description Comment    9/28/2022  9:43 PM Reji Hansen Add [I50 9] Acute exacerbation of CHF (congestive heart failure) ([place] [address] )     9/28/2022  9:43 PM Amy Urena JONEL Add [B88 8] Infestation by bed bug     9/30/2022  8:18 AM Angela Mark Add [I49 9] Arrhythmia       ED Disposition     ED Disposition   Admit    Condition   Stable    Date/Time   Wed Sep 28, 2022  9:43 PM    Comment   Case was discussed with Pattie MONTOYA and the patient's admission status was agreed to be Admission Status: observation status to the service of Dr Payton Zhou MD Documentation    [address] Name, [address] [facility]      RN Documentation    [address]ava Name, [facility]      Follow-up Information    None         Current Discharge Medication List      CONTINUE these medications which have NOT CHANGED    Details   aspirin 81 mg chewable tablet Chew 81 mg daily      ethacrynic acid (EDECRIN) 25 mg tablet Take 25 mg by mouth daily      ipratropium (ATROVENT) 0 03 % nasal spray 2 sprays into each nostril every 12 (twelve) hours  Qty: 30 mL, Refills: 0    Associated Diagnoses: Vasomotor rhinitis      metoprolol tartrate (LOPRESSOR) 25 mg tablet Take 25 mg by mouth 2 (two) times a day             No discharge procedures on file      PDMP Review     None          ED Provider  Electronically Signed by           Dixon Wing DO  10/04/22 8819

## 2022-09-29 NOTE — RESPIRATORY THERAPY NOTE
COPD education completed and booklet given to PT  Discussed with PT and Friend COPD severity zones, nutrition, Energy conservation and pursed lip breathing

## 2022-09-29 NOTE — ASSESSMENT & PLAN NOTE
Wt Readings from Last 3 Encounters:   09/29/22 93 8 kg (206 lb 12 7 oz)   09/02/22 89 4 kg (197 lb)   03/11/22 86 2 kg (190 lb)     Pt with shortness of breath, leg edema  · Was previously on torsemide 20mg but stopped secondary to drug reaction, has been on ethacrynic acid for 5 days with no improvement   Echo shows EF 93%, normal diastolic and systolic function, moderate tricuspid regurg   BNP 10,385   Troponin trend negative   Start bumex 1g bid   Daily weights, I/Os   Fluid, sodium restriction

## 2022-09-29 NOTE — ASSESSMENT & PLAN NOTE
Patient with rash over body that has been presents for a few weeks  · Seen by dermatologist and told she has sulfa allergy, reaction to torsemide  · Hydrocortisone cream, benadryl prn

## 2022-09-30 ENCOUNTER — EPISODE CHANGES (OUTPATIENT)
Dept: CASE MANAGEMENT | Facility: OTHER | Age: 77
End: 2022-09-30

## 2022-09-30 ENCOUNTER — APPOINTMENT (INPATIENT)
Dept: NON INVASIVE DIAGNOSTICS | Facility: HOSPITAL | Age: 77
DRG: 291 | End: 2022-09-30
Payer: MEDICARE

## 2022-09-30 PROBLEM — I47.10 SVT (SUPRAVENTRICULAR TACHYCARDIA): Status: ACTIVE | Noted: 2022-01-01

## 2022-09-30 PROBLEM — I47.1 SVT (SUPRAVENTRICULAR TACHYCARDIA) (HCC): Status: ACTIVE | Noted: 2022-09-30

## 2022-09-30 PROBLEM — R00.0: Status: ACTIVE | Noted: 2022-09-30

## 2022-09-30 LAB
AORTIC ROOT: 3.2 CM
APICAL FOUR CHAMBER EJECTION FRACTION: 37 %
FRACTIONAL SHORTENING: 21 (ref 28–44)
INTERVENTRICULAR SEPTUM IN DIASTOLE (PARASTERNAL SHORT AXIS VIEW): 1 CM
INTERVENTRICULAR SEPTUM: 1 CM (ref 0.6–1.1)
LAAS-AP2: 34.8 CM2
LAAS-AP4: 36 CM2
LEFT ATRIUM AREA SYSTOLE SINGLE PLANE A4C: 37.8 CM2
LEFT ATRIUM SIZE: 4.4 CM
LEFT INTERNAL DIMENSION IN SYSTOLE: 4.5 CM (ref 2.1–4)
LEFT VENTRICULAR INTERNAL DIMENSION IN DIASTOLE: 5.7 CM (ref 3.5–6)
LEFT VENTRICULAR POSTERIOR WALL IN END DIASTOLE: 0.9 CM
LEFT VENTRICULAR STROKE VOLUME: 67 ML
LVSV (TEICH): 67 ML
MITRAL REGURGITATION PEAK VELOCITY: 5.06 M/S
MITRAL VALVE MEAN INFLOW VELOCITY: 4.15 M/S
MITRAL VALVE REGURGITANT PEAK GRADIENT: 102 MMHG
NT-PROBNP SERPL-MCNC: 9718 PG/ML
RA PRESSURE ESTIMATED: 8 MMHG
RIGHT ATRIUM AREA SYSTOLE A4C: 25.2 CM2
RIGHT VENTRICLE ID DIMENSION: 4.1 CM
RV PSP: 38 MMHG
SL CV DOP CALC MV VTI RETROGRADE: 154.7 CM
SL CV LEFT ATRIUM LENGTH A2C: 6.7 CM
SL CV LV EF: 50
SL CV MV MEAN GRADIENT RETROGRADE: 77 MMHG
SL CV PED ECHO LEFT VENTRICLE DIASTOLIC VOLUME (MOD BIPLANE) 2D: 159 ML
SL CV PED ECHO LEFT VENTRICLE SYSTOLIC VOLUME (MOD BIPLANE) 2D: 92 ML
TR MAX PG: 30 MMHG
TR PEAK VELOCITY: 2.8 M/S
TRICUSPID VALVE PEAK REGURGITATION VELOCITY: 2.75 M/S
TSH SERPL DL<=0.05 MIU/L-ACNC: 1.85 UIU/ML (ref 0.45–4.5)

## 2022-09-30 PROCEDURE — 99223 1ST HOSP IP/OBS HIGH 75: CPT | Performed by: INTERNAL MEDICINE

## 2022-09-30 PROCEDURE — 93308 TTE F-UP OR LMTD: CPT

## 2022-09-30 PROCEDURE — 93321 DOPPLER ECHO F-UP/LMTD STD: CPT | Performed by: INTERNAL MEDICINE

## 2022-09-30 PROCEDURE — 93308 TTE F-UP OR LMTD: CPT | Performed by: INTERNAL MEDICINE

## 2022-09-30 PROCEDURE — 97167 OT EVAL HIGH COMPLEX 60 MIN: CPT

## 2022-09-30 PROCEDURE — 93325 DOPPLER ECHO COLOR FLOW MAPG: CPT | Performed by: INTERNAL MEDICINE

## 2022-09-30 PROCEDURE — 93325 DOPPLER ECHO COLOR FLOW MAPG: CPT

## 2022-09-30 PROCEDURE — 83880 ASSAY OF NATRIURETIC PEPTIDE: CPT | Performed by: STUDENT IN AN ORGANIZED HEALTH CARE EDUCATION/TRAINING PROGRAM

## 2022-09-30 PROCEDURE — 93321 DOPPLER ECHO F-UP/LMTD STD: CPT

## 2022-09-30 PROCEDURE — 84443 ASSAY THYROID STIM HORMONE: CPT | Performed by: NURSE PRACTITIONER

## 2022-09-30 PROCEDURE — 99232 SBSQ HOSP IP/OBS MODERATE 35: CPT | Performed by: STUDENT IN AN ORGANIZED HEALTH CARE EDUCATION/TRAINING PROGRAM

## 2022-09-30 RX ORDER — METOPROLOL TARTRATE 50 MG/1
50 TABLET, FILM COATED ORAL 2 TIMES DAILY
Status: DISCONTINUED | OUTPATIENT
Start: 2022-09-30 | End: 2022-10-07 | Stop reason: HOSPADM

## 2022-09-30 RX ORDER — TORSEMIDE 20 MG/1
20 TABLET ORAL DAILY
Status: DISCONTINUED | OUTPATIENT
Start: 2022-09-30 | End: 2022-10-01

## 2022-09-30 RX ORDER — DILTIAZEM HYDROCHLORIDE 5 MG/ML
15 INJECTION INTRAVENOUS ONCE
Status: COMPLETED | OUTPATIENT
Start: 2022-09-30 | End: 2022-09-30

## 2022-09-30 RX ADMIN — APIXABAN 5 MG: 5 TABLET, FILM COATED ORAL at 13:03

## 2022-09-30 RX ADMIN — TORSEMIDE 20 MG: 20 TABLET ORAL at 15:40

## 2022-09-30 RX ADMIN — DILTIAZEM HYDROCHLORIDE 15 MG: 5 INJECTION INTRAVENOUS at 15:38

## 2022-09-30 RX ADMIN — HEPARIN SODIUM 5000 UNITS: 5000 INJECTION INTRAVENOUS; SUBCUTANEOUS at 06:30

## 2022-09-30 RX ADMIN — METOPROLOL TARTRATE 50 MG: 50 TABLET, FILM COATED ORAL at 09:29

## 2022-09-30 RX ADMIN — ASPIRIN 81 MG CHEWABLE TABLET 81 MG: 81 TABLET CHEWABLE at 09:29

## 2022-09-30 RX ADMIN — APIXABAN 5 MG: 5 TABLET, FILM COATED ORAL at 18:44

## 2022-09-30 RX ADMIN — DILTIAZEM HYDROCHLORIDE 5 MG/HR: 5 INJECTION INTRAVENOUS at 15:56

## 2022-09-30 NOTE — ASSESSMENT & PLAN NOTE
Asymptomatic,  Noted on tele this morning, later looked like A-fib  Increase metoprolol to 50 mg B i d   Cardio consulted

## 2022-09-30 NOTE — PLAN OF CARE
Problem: MOBILITY - ADULT  Goal: Maintain or return to baseline ADL function  Description: INTERVENTIONS:  -  Assess patient's ability to carry out ADLs; assess patient's baseline for ADL function and identify physical deficits which impact ability to perform ADLs (bathing, care of mouth/teeth, toileting, grooming, dressing, etc )  - Assess/evaluate cause of self-care deficits   - Assess range of motion  - Assess patient's mobility; develop plan if impaired  - Assess patient's need for assistive devices and provide as appropriate  - Encourage maximum independence but intervene and supervise when necessary  - Involve family in performance of ADLs  - Assess for home care needs following discharge   - Consider OT consult to assist with ADL evaluation and planning for discharge  - Provide patient education as appropriate  Outcome: Progressing  Goal: Maintains/Returns to pre admission functional level  Description: INTERVENTIONS:  - Perform BMAT or MOVE assessment daily    - Set and communicate daily mobility goal to care team and patient/family/caregiver  - Collaborate with rehabilitation services on mobility goals if consulted  - Perform Range of Motion 2 times a day  - Reposition patient every 2 hours    - Dangle patient 2 times a day  - Stand patient 2 times a day  - Ambulate patient 2 times a day  - Out of bed to chair 2 times a day   - Out of bed for meals 2 times a day  - Out of bed for toileting  - Record patient progress and toleration of activity level   Outcome: Progressing     Problem: PAIN - ADULT  Goal: Verbalizes/displays adequate comfort level or baseline comfort level  Description: Interventions:  - Encourage patient to monitor pain and request assistance  - Assess pain using appropriate pain scale  - Administer analgesics based on type and severity of pain and evaluate response  - Implement non-pharmacological measures as appropriate and evaluate response  - Consider cultural and social influences on pain and pain management  - Notify physician/advanced practitioner if interventions unsuccessful or patient reports new pain  Outcome: Progressing     Problem: SAFETY ADULT  Goal: Maintain or return to baseline ADL function  Description: INTERVENTIONS:  -  Assess patient's ability to carry out ADLs; assess patient's baseline for ADL function and identify physical deficits which impact ability to perform ADLs (bathing, care of mouth/teeth, toileting, grooming, dressing, etc )  - Assess/evaluate cause of self-care deficits   - Assess range of motion  - Assess patient's mobility; develop plan if impaired  - Assess patient's need for assistive devices and provide as appropriate  - Encourage maximum independence but intervene and supervise when necessary  - Involve family in performance of ADLs  - Assess for home care needs following discharge   - Consider OT consult to assist with ADL evaluation and planning for discharge  - Provide patient education as appropriate  Outcome: Progressing  Goal: Patient will remain free of falls  Description: INTERVENTIONS:  - Educate patient/family on patient safety including physical limitations  - Instruct patient to call for assistance with activity   - Consult OT/PT to assist with strengthening/mobility   - Keep Call bell within reach  - Keep bed low and locked with side rails adjusted as appropriate  - Keep care items and personal belongings within reach  - Initiate and maintain comfort rounds  - Make Fall Risk Sign visible to staff  - Offer Toileting every 2 Hours, in advance of need  - Initiate/Maintain bed alarm  - Obtain necessary fall risk management equipment: walker  - Apply yellow socks and bracelet for high fall risk patients  - Consider moving patient to room near nurses station  Outcome: Progressing     Problem: DISCHARGE PLANNING  Goal: Discharge to home or other facility with appropriate resources  Description: INTERVENTIONS:  - Identify barriers to discharge w/patient and caregiver  - Arrange for needed discharge resources and transportation as appropriate  - Identify discharge learning needs (meds, wound care, etc )  - Arrange for interpretive services to assist at discharge as needed  - Refer to Case Management Department for coordinating discharge planning if the patient needs post-hospital services based on physician/advanced practitioner order or complex needs related to functional status, cognitive ability, or social support system  Outcome: Progressing     Problem: CARDIOVASCULAR - ADULT  Goal: Maintains optimal cardiac output and hemodynamic stability  Description: INTERVENTIONS:  - Monitor I/O, vital signs and rhythm  - Monitor for S/S and trends of decreased cardiac output  - Administer and titrate ordered vasoactive medications to optimize hemodynamic stability  - Assess quality of pulses, skin color and temperature  - Assess for signs of decreased coronary artery perfusion  - Instruct patient to report change in severity of symptoms  Outcome: Progressing  Goal: Absence of cardiac dysrhythmias or at baseline rhythm  Description: INTERVENTIONS:  - Continuous cardiac monitoring, vital signs, obtain 12 lead EKG if ordered  - Administer antiarrhythmic and heart rate control medications as ordered  - Monitor electrolytes and administer replacement therapy as ordered  Outcome: Progressing     Problem: SKIN/TISSUE INTEGRITY - ADULT  Goal: Skin Integrity remains intact(Skin Breakdown Prevention)  Description: Assess:  -Perform Judah assessment every shift  -Clean and moisturize skin every shift  -Inspect skin when repositioning, toileting, and assisting with ADLS  -Assess extremities for adequate circulation and sensation     Bed Management:  -Have minimal linens on bed & keep smooth, unwrinkled  -Change linens as needed when moist or perspiring  -Avoid sitting or lying in one position for more than 2 hours while in bed  -Keep HOB at 30 degrees Toileting:  -Offer bedside commode  -Assess for incontinence every shift    Activity:  -Mobilize patient 2 times a day  -Encourage activity and walks on unit  -Encourage or provide ROM exercises   -Turn and reposition patient every 2 Hours  -Use appropriate equipment to lift or move patient in bed  -Instruct/ Assist with weight shifting every 2 hours when out of bed in chair  -Consider limitation of chair time 2 hour intervals  Outcome: Progressing     Problem: MUSCULOSKELETAL - ADULT  Goal: Maintain or return mobility to safest level of function  Description: INTERVENTIONS:  - Assess patient's ability to carry out ADLs; assess patient's baseline for ADL function and identify physical deficits which impact ability to perform ADLs (bathing, care of mouth/teeth, toileting, grooming, dressing, etc )  - Assess/evaluate cause of self-care deficits   - Assess range of motion  - Assess patient's mobility  - Assess patient's need for assistive devices and provide as appropriate  - Encourage maximum independence but intervene and supervise when necessary  - Involve family in performance of ADLs  - Assess for home care needs following discharge   - Consider OT consult to assist with ADL evaluation and planning for discharge  - Provide patient education as appropriate  Outcome: Progressing     Problem: Potential for Falls  Goal: Patient will remain free of falls  Description: INTERVENTIONS:  - Educate patient/family on patient safety including physical limitations  - Instruct patient to call for assistance with activity   - Consult OT/PT to assist with strengthening/mobility   - Keep Call bell within reach  - Keep bed low and locked with side rails adjusted as appropriate  - Keep care items and personal belongings within reach  - Initiate and maintain comfort rounds  - Make Fall Risk Sign visible to staff  - Offer Toileting every 2 Hours, in advance of need  - Initiate/Maintain bed alarm  - Obtain necessary fall risk management equipment: walker  - Apply yellow socks and bracelet for high fall risk patients  - Consider moving patient to room near nurses station  Outcome: Progressing     Problem: Prexisting or High Potential for Compromised Skin Integrity  Goal: Skin integrity is maintained or improved  Description: INTERVENTIONS:  - Identify patients at risk for skin breakdown  - Assess and monitor skin integrity  - Assess and monitor nutrition and hydration status  - Monitor labs   - Assess for incontinence   - Turn and reposition patient  - Assist with mobility/ambulation  - Relieve pressure over bony prominences  - Avoid friction and shearing  - Provide appropriate hygiene as needed including keeping skin clean and dry  - Evaluate need for skin moisturizer/barrier cream  - Collaborate with interdisciplinary team   - Patient/family teaching  - Consider wound care consult   Outcome: Progressing     Problem: SAFETY,RESTRAINT: NV/NON-SELF DESTRUCTIVE BEHAVIOR  Goal: Remains free of harm/injury (restraint for non violent/non self-detsructive behavior)  Description: INTERVENTIONS:  - Instruct patient/family regarding restraint use   - Assess and monitor physiologic and psychological status   - Provide interventions and comfort measures to meet assessed patient needs   - Identify and implement measures to help patient regain control  - Assess readiness for release of restraint   Outcome: Progressing  Goal: Returns to optimal restraint-free functioning  Description: INTERVENTIONS:  - Assess the patient's behavior and symptoms that indicate continued need for restraint  - Identify and implement measures to help patient regain control  - Assess readiness for release of restraint   Outcome: Progressing

## 2022-09-30 NOTE — QUICK NOTE
Quick note  09/30/2022 1512    Patient remains in rapid atrial fibrillation rates 110-125  Blood pressure stable  Will start Cardizem drip at Cardizem 15 mg bolus and then drip at 5 milligrams/hour  Parameters placed for nursing staff    Continue telemetry    [facility]  Cardiology

## 2022-09-30 NOTE — PLAN OF CARE
Problem: MOBILITY - ADULT  Goal: Maintain or return to baseline ADL function  Description: INTERVENTIONS:  -  Assess patient's ability to carry out ADLs; assess patient's baseline for ADL function and identify physical deficits which impact ability to perform ADLs (bathing, care of mouth/teeth, toileting, grooming, dressing, etc )  - Assess/evaluate cause of self-care deficits   - Assess range of motion  - Assess patient's mobility; develop plan if impaired  - Assess patient's need for assistive devices and provide as appropriate  - Encourage maximum independence but intervene and supervise when necessary  - Involve family in performance of ADLs  - Assess for home care needs following discharge   - Consider OT consult to assist with ADL evaluation and planning for discharge  - Provide patient education as appropriate  Outcome: Progressing  Goal: Maintains/Returns to pre admission functional level  Description: INTERVENTIONS:  - Perform BMAT or MOVE assessment daily    - Set and communicate daily mobility goal to care team and patient/family/caregiver  - Collaborate with rehabilitation services on mobility goals if consulted  - Perform Range of Motion 5 times a day  - Reposition patient every 2 hours    - Dangle patient 3 times a day  - Stand patient 3 times a day  - Ambulate patient 3 times a day  - Out of bed to chair 3 times a day   - Out of bed for meals 3 times a day  - Out of bed for toileting  - Record patient progress and toleration of activity level   Outcome: Progressing     Problem: PAIN - ADULT  Goal: Verbalizes/displays adequate comfort level or baseline comfort level  Description: Interventions:  - Encourage patient to monitor pain and request assistance  - Assess pain using appropriate pain scale  - Administer analgesics based on type and severity of pain and evaluate response  - Implement non-pharmacological measures as appropriate and evaluate response  - Consider cultural and social influences on pain and pain management  - Notify physician/advanced practitioner if interventions unsuccessful or patient reports new pain  Outcome: Progressing     Problem: SAFETY ADULT  Goal: Maintain or return to baseline ADL function  Description: INTERVENTIONS:  -  Assess patient's ability to carry out ADLs; assess patient's baseline for ADL function and identify physical deficits which impact ability to perform ADLs (bathing, care of mouth/teeth, toileting, grooming, dressing, etc )  - Assess/evaluate cause of self-care deficits   - Assess range of motion  - Assess patient's mobility; develop plan if impaired  - Assess patient's need for assistive devices and provide as appropriate  - Encourage maximum independence but intervene and supervise when necessary  - Involve family in performance of ADLs  - Assess for home care needs following discharge   - Consider OT consult to assist with ADL evaluation and planning for discharge  - Provide patient education as appropriate  Outcome: Progressing  Goal: Patient will remain free of falls  Description: INTERVENTIONS:  - Educate patient/family on patient safety including physical limitations  - Instruct patient to call for assistance with activity   - Consult OT/PT to assist with strengthening/mobility   - Keep Call bell within reach  - Keep bed low and locked with side rails adjusted as appropriate  - Keep care items and personal belongings within reach  - Initiate and maintain comfort rounds  - Make Fall Risk Sign visible to staff  - Offer Toileting every 2 Hours, in advance of need  - Initiate/Maintain bed, chair alarm  - Obtain necessary fall risk management equipment: alarms, walker  - Apply yellow socks and bracelet for high fall risk patients  - Consider moving patient to room near nurses station  Outcome: Progressing     Problem: DISCHARGE PLANNING  Goal: Discharge to home or other facility with appropriate resources  Description: INTERVENTIONS:  - Identify barriers to discharge w/patient and caregiver  - Arrange for needed discharge resources and transportation as appropriate  - Identify discharge learning needs (meds, wound care, etc )  - Arrange for interpretive services to assist at discharge as needed  - Refer to Case Management Department for coordinating discharge planning if the patient needs post-hospital services based on physician/advanced practitioner order or complex needs related to functional status, cognitive ability, or social support system  Outcome: Progressing     Problem: CARDIOVASCULAR - ADULT  Goal: Maintains optimal cardiac output and hemodynamic stability  Description: INTERVENTIONS:  - Monitor I/O, vital signs and rhythm  - Monitor for S/S and trends of decreased cardiac output  - Administer and titrate ordered vasoactive medications to optimize hemodynamic stability  - Assess quality of pulses, skin color and temperature  - Assess for signs of decreased coronary artery perfusion  - Instruct patient to report change in severity of symptoms  Outcome: Progressing  Goal: Absence of cardiac dysrhythmias or at baseline rhythm  Description: INTERVENTIONS:  - Continuous cardiac monitoring, vital signs, obtain 12 lead EKG if ordered  - Administer antiarrhythmic and heart rate control medications as ordered  - Monitor electrolytes and administer replacement therapy as ordered  Outcome: Progressing     Problem: SKIN/TISSUE INTEGRITY - ADULT  Goal: Skin Integrity remains intact(Skin Breakdown Prevention)  Description: Assess:  -Perform Judah assessment every q4h  -Clean and moisturize skin every shift  -Inspect skin when repositioning, toileting, and assisting with ADLS  -Assess under medical devices such as masimo, restraints every q2  -Assess extremities for adequate circulation and sensation     Bed Management:  -Have minimal linens on bed & keep smooth, unwrinkled  -Change linens as needed when moist or perspiring  -Avoid sitting or lying in one position for more than 2 hours while in bed  -Keep HOB at 30-45 degrees     Toileting:  -Offer bedside commode  -Assess for incontinence every q2  -Use incontinent care products after each incontinent episode such as moisture barrier    Activity:  -Mobilize patient 3 times a day  -Encourage activity and walks on unit  -Encourage or provide ROM exercises   -Turn and reposition patient every 2 Hours  -Use appropriate equipment to lift or move patient in bed  -Instruct/ Assist with weight shifting every q2 when out of bed in chair  -Consider limitation of chair time 2 hour intervals    Skin Care:  -Avoid use of baby powder, tape, friction and shearing, hot water or constrictive clothing  -Relieve pressure over bony prominences using allevyn  -Do not massage red bony areas    Next Steps:  -Teach patient strategies to minimize risks such as repositioning   -Consider consults to  interdisciplinary teams such as ptot  Outcome: Progressing     Problem: MUSCULOSKELETAL - ADULT  Goal: Maintain or return mobility to safest level of function  Description: INTERVENTIONS:  - Assess patient's ability to carry out ADLs; assess patient's baseline for ADL function and identify physical deficits which impact ability to perform ADLs (bathing, care of mouth/teeth, toileting, grooming, dressing, etc )  - Assess/evaluate cause of self-care deficits   - Assess range of motion  - Assess patient's mobility  - Assess patient's need for assistive devices and provide as appropriate  - Encourage maximum independence but intervene and supervise when necessary  - Involve family in performance of ADLs  - Assess for home care needs following discharge   - Consider OT consult to assist with ADL evaluation and planning for discharge  - Provide patient education as appropriate  Outcome: Progressing     Problem: Potential for Falls  Goal: Patient will remain free of falls  Description: INTERVENTIONS:  - Educate patient/family on patient safety including physical limitations  - Instruct patient to call for assistance with activity   - Consult OT/PT to assist with strengthening/mobility   - Keep Call bell within reach  - Keep bed low and locked with side rails adjusted as appropriate  - Keep care items and personal belongings within reach  - Initiate and maintain comfort rounds  - Make Fall Risk Sign visible to staff  - Offer Toileting every 2 Hours, in advance of need  - Initiate/Maintain bed alarm  - Obtain necessary fall risk management equipment: walker, alarms  - Apply yellow socks and bracelet for high fall risk patients  - Consider moving patient to room near nurses station  Outcome: Progressing     Problem: Prexisting or High Potential for Compromised Skin Integrity  Goal: Skin integrity is maintained or improved  Description: INTERVENTIONS:  - Identify patients at risk for skin breakdown  - Assess and monitor skin integrity  - Assess and monitor nutrition and hydration status  - Monitor labs   - Assess for incontinence   - Turn and reposition patient  - Assist with mobility/ambulation  - Relieve pressure over bony prominences  - Avoid friction and shearing  - Provide appropriate hygiene as needed including keeping skin clean and dry  - Evaluate need for skin moisturizer/barrier cream  - Collaborate with interdisciplinary team   - Patient/family teaching  - Consider wound care consult   Outcome: Progressing     Problem: SAFETY,RESTRAINT: NV/NON-SELF DESTRUCTIVE BEHAVIOR  Goal: Remains free of harm/injury (restraint for non violent/non self-detsructive behavior)  Description: INTERVENTIONS:  - Instruct patient/family regarding restraint use   - Assess and monitor physiologic and psychological status   - Provide interventions and comfort measures to meet assessed patient needs   - Identify and implement measures to help patient regain control  - Assess readiness for release of restraint   Outcome: Progressing  Goal: Returns to optimal restraint-free functioning  Description: INTERVENTIONS:  - Assess the patient's behavior and symptoms that indicate continued need for restraint  - Identify and implement measures to help patient regain control  - Assess readiness for release of restraint   Outcome: Progressing

## 2022-09-30 NOTE — PROGRESS NOTES
Katharine 45  Progress Note Champ Avel 1945, 68 y o  female MRN: [id number]  Unit/Bed#: [address] [place] [address] Encounter: [id number]  Primary Care Provider: Olu Cardoso MD   Date and time admitted to hospital: 9/28/2022  5:56 PM    Sinoatrial node tachycardia  Assessment & Plan  Asymptomatic,  Noted on tele this morning, later looked like A-fib  Increase metoprolol to 50 mg B i d   Cardio consulted      * Acute exacerbation of CHF (congestive heart failure) ([facility])  Assessment & Plan  Wt Readings from Last 3 Encounters:   09/29/22 93 8 kg (206 lb 12 7 oz)   09/02/22 89 4 kg (197 lb)   03/11/22 86 2 kg (190 lb)     Acute CHF likely Diastolic in the setting of Hypertension evidenced by BNP 10,385, SOB and leg edema, treated with IV Bumex BID, daily weights, I&Os and fluid restriction      · Was previously on torsemide 20mg but stopped secondary to drug reaction, has been on ethacrynic acid for 5 days with no improvement, currently on Bumex   Last Echo shows EF 35%, normal diastolic and systolic function, moderate tricuspid regurg, repeat pending          Infestation by bed bug  Assessment & Plan  Decontaminated in ED    Dementia with behavioral disturbance, unspecified dementia type ([facility] [address] )  Assessment & Plan  Pt lives alone, daughter concerned she is unable to care for herself  · No longer on medication  · CM consult for long-term placement  · Prn geodon for agitation    Rash and nonspecific skin eruption  Assessment & Plan  Patient with rash over body that has been presents for a few weeks  · Seen by dermatologist and told she has sulfa allergy, reaction to torsemide  · Hydrocortisone cream, benadryl prn    CKD (chronic kidney disease) stage 4, GFR 15-29 ml/min [facility])  Assessment & Plan  Lab Results   Component Value Date    EGFR 28 09/29/2022    EGFR 27 09/28/2022    EGFR 23 02/26/2022    CREATININE 1 73 (H) 09/29/2022    CREATININE 1 76 (H) 09/28/2022    CREATININE 2 01 (H) 02/26/2022 Creatinine at baseline, monitor with BMP  Avoid nephrotoxic    Coronary artery disease involving native coronary artery of native heart without angina pectoris  Assessment & Plan  Asymptomatic,  S/p PCI in  with NARGIS to RCA, 2013 NARGIS to LAD    · Troponin trend negative  · Continue metoprolol    Essential hypertension  Assessment & Plan  Chronic, Continue increased metoprolol 50mg bid        VTE Pharmacologic Prophylaxis: VTE Score: 4 Moderate Risk (Score 3-4) - Pharmacological DVT Prophylaxis Ordered: heparin  Patient Centered Rounds: I performed bedside rounds with nursing staff today  Discussions with Specialists or Other Care Team Provider:  Cardiology, case management    Education and Discussions with Family / Patient: Updated  (friend) at bedside  Sheeba Been    Time Spent for Care: 30 minutes  More than 50% of total time spent on counseling and coordination of care as described above  Current Length of Stay: 1 day(s)  Current Patient Status: Inpatient   Certification Statement: The patient will continue to require additional inpatient hospital stay due to Clinical course of care  Discharge Plan: Anticipate discharge in 24-48 hrs to discharge location to be determined pending rehab evaluations  Code Status: Level 1 - Full Code    Subjective:   Patient seen and examined at bedside with friend I remain present  Patient was alert and reported no pain, palpitations or shortness of breath  Patient stated that her rash is improving  Friend was concerned about patient's long-term care  Objective:     Vitals:   Temp (24hrs), Av 2 °F (36 8 °C), Min:97 5 °F (36 4 °C), Max:98 7 °F (37 1 °C)    Temp:  [97 5 °F (36 4 °C)-98 7 °F (37 1 °C)] 98 2 °F (36 8 °C)  HR:  [] 121  Resp:  [16-20] 20  BP: (122-148)/(55-96) 144/90  SpO2:  [91 %-95 %] 91 %  Body mass index is 31 32 kg/m²  Input and Output Summary (last 24 hours):      Intake/Output Summary (Last 24 hours) at 2022 1238  Last data filed at 9/30/2022 0751  Gross per 24 hour   Intake 170 ml   Output 1050 ml   Net -880 ml       Physical Exam:   Physical Exam  Vitals and nursing note reviewed  Constitutional:       General: She is not in acute distress  Appearance: She is well-developed  HENT:      Head: Normocephalic and atraumatic  Eyes:      Conjunctiva/sclera: Conjunctivae normal    Cardiovascular:      Rate and Rhythm: Normal rate and regular rhythm  Heart sounds: No murmur heard  Pulmonary:      Effort: Pulmonary effort is normal  No respiratory distress  Breath sounds: Normal breath sounds  Abdominal:      Palpations: Abdomen is soft  Tenderness: There is no abdominal tenderness  Musculoskeletal:      Cervical back: Neck supple  Right lower leg: No edema  Left lower leg: No edema  Skin:     General: Skin is warm and dry  Neurological:      Mental Status: She is alert  She is disoriented  Motor: No weakness  Psychiatric:         Mood and Affect: Mood normal          Thought Content:  Thought content normal          Judgment: Judgment normal           Additional Data:     Labs:  Results from last 7 days   Lab Units 09/29/22  0523   WBC Thousand/uL 6 43   HEMOGLOBIN g/dL 11 5   HEMATOCRIT % 36 7   PLATELETS Thousands/uL 170   NEUTROS PCT % 74   LYMPHS PCT % 13*   MONOS PCT % 11   EOS PCT % 1     Results from last 7 days   Lab Units 09/29/22  0523 09/28/22  1848   SODIUM mmol/L 141 142   POTASSIUM mmol/L 4 0 4 4   CHLORIDE mmol/L 106 106   CO2 mmol/L 26 28   BUN mg/dL 19 21   CREATININE mg/dL 1 73* 1 76*   ANION GAP mmol/L 9 8   CALCIUM mg/dL 8 3 8 7   ALBUMIN g/dL  --  3 4*   TOTAL BILIRUBIN mg/dL  --  0 61   ALK PHOS U/L  --  86   ALT U/L  --  16   AST U/L  --  19   GLUCOSE RANDOM mg/dL 99 88                       Lines/Drains:  Invasive Devices  Report    Peripheral Intravenous Line  Duration           Peripheral IV 09/30/22 Right;Ventral (anterior) Forearm <1 day Telemetry:  Telemetry Orders (From admission, onward)             48 Hour Telemetry Monitoring  Continuous x 48 hours        References:    Telemetry Guidelines   Question:  Reason for 48 Hour Telemetry  Answer:  Acute Decompensated CHF (continuous diuretic infusion or total diuretic dose > 200 mg daily, associated electrolyte derangement, ionotropic drip, history of ventricular arrhythmia, or new EF <35%)                 Telemetry Reviewed: Sinus Tachycardia  Indication for Continued Telemetry Use: Arrthymias requiring medical therapy             Imaging: Reviewed radiology reports from this admission including: vas    Recent Cultures (last 7 days):         Last 24 Hours Medication List:   Current Facility-Administered Medications   Medication Dose Route Frequency Provider Last Rate    acetaminophen  650 mg Oral Q6H PRN Fadi Duarte PA-C      apixaban  5 mg Oral BID Mechelsiva Apo, CRNP      aspirin  81 mg Oral Daily Solange HENRIQUE Martinez      hydrocortisone   Topical 4x Daily PRN Fadi Duarte PA-C      ipratropium  2 spray Nasal Q12H Solange HENRIQUE Martinez      metoprolol tartrate  50 mg Oral BID Chris Rodriguez MD      ondansetron  4 mg Intravenous Q6H PRN Fadi Duarte PA-C      torsemide  20 mg Oral Daily Mecdangelo Apo, CRNP      traMADol  50 mg Oral Q6H PRN Fadi Duarte PA-C          Today, Patient Was Seen By: Chris Rodriguez    **Please Note: This note may have been constructed using a voice recognition system  **

## 2022-09-30 NOTE — OCCUPATIONAL THERAPY NOTE
OT EVALUATION       09/30/22 1004   Note Type   Note type Evaluation   Restrictions/Precautions   Other Precautions Chair Alarm; Bed Alarm; Fall Risk;Cognitive; Restraints   Pain Assessment   Pain Assessment Tool 0-10   Pain Score No Pain   Home Living   Type of Home House   Home Layout Two level; Able to live on main level with bedroom/bathroom   Bathroom Shower/Tub Tub/shower unit   Additional Comments Pt's friend Rosette Smith reported  Pt lives alone   Rosette Smith is her best friend since they were younger and comes to spend time with the pt for several hours each day  She will help her get a shower 1 or 2x/wk  Pt does not sit to shower  Pt has one step to get to her kitchen with no handrails to help her  Daughter is looking for a RW to assist with that step down to kitchen  Pt does cook for herself and walks without an AD  Prior Function   Level of [place] Independent with ADLs and functional mobility; Needs assistance with IADLs   Lives With Alone   Receives Help From Family;Friend(s)   ADL Assistance Independent   IADLs Needs assistance   Comments Patient admitted with leg swelling     ADL   Eating Assistance 3  Moderate Assistance   Grooming Assistance 3  Moderate Assistance   UB Bathing Assistance 2  Maximal Assistance   LB Bathing Assistance 2  Maximal Assistance   UB Dressing Assistance 2  Maximal Assistance   LB Dressing Assistance 2  Maximal [address]  2  Maximal Assistance   Bed Mobility   Supine to Sit 3  Moderate assistance   Sit to Supine 3  Moderate assistance   Transfers   Sit to Stand 3  Moderate assistance   Stand to Sit 3  Moderate assistance   Functional Mobility   Functional Mobility 3  Moderate assistance   Additional Comments 2 steps to head of bed with RW, pt very confused   Additional items Rolling walker   Balance   Static Sitting Fair   Dynamic Sitting Fair -   Static Standing Poor +   Dynamic Standing Poor   Activity Tolerance   Activity Tolerance Patient limited by fatigue  (cognition)   Nurse Made Aware yes, Xavier Pratt Assessment   RUE Assessment WFL   LUE Assessment   LUE Assessment WFL   Cognition   Overall Cognitive Status Impaired   Arousal/Participation Cooperative   Attention Difficulty attending to directions   Orientation Level Disoriented X4   Following Commands Follows one step commands with increased time or repetition   Comments pt is very confused, unable to state prior fucntional level or home setup   Assessment   Limitation Decreased ADL status; Decreased UE strength;Decreased Safe judgement during ADL;Decreased endurance;Decreased high-level ADLs; Decreased self-care trans;Decreased cognition  (decreased balance and mobility)   Prognosis Good   Assessment Patient evaluated by Occupational Therapy  Patient admitted with Acute exacerbation of CHF (congestive heart failure) ([facility] [address] )  The patients occupational profile, medical and therapy history includes a extensive additional review of physical, cognitive, or psychosocial history related to current functional performance  Comorbidities affecting functional mobility and ADLS include: arthritis, CAD, CHF and COPD  Prior to admission, patient was independent with functional mobility without assistive device, independent with ADLS and requiring assist for IADLS  The evaluation identifies the following performance deficits: weakness, impaired balance, decreased endurance, increased fall risk, new onset of impairment of functional mobility, decreased ADLS, decreased IADLS, decreased activity tolerance, decreased safety awareness, impaired judgement, decreased cognition and decreased strength, that result in activity limitations and/or participation restrictions   This evaluation requires clinical decision making of high complexity, because the patient presents with comorbidites that affect occupational performance and required significant modification of tasks or assistance with consideration of multiple treatment options  The Barthel Index was used as a functional outcome tool presenting with a score of Barthel Index Score: 20, indicating marked limitations of functional mobility and ADLS  The patient's raw score on the -PAC Daily Activity inpatient short form low function score is 14, standardized score is Low Function Daily Activity Standardized Score: 24 79  Patients with a standardized score less than 39 4 are likely to benefit from discharge to post-acute rehab services  Please refer to the recommendation of the Occupational Therapist for safe discharge planning  Patient will benefit from skilled Occupational Therapy services to address above deficits and facilitate a safe return to prior level of function  Goals   Patient Goals unable to state due to cognitive impairment   STG Time Frame   (1-7 days)   Short Term Goal  Goals established to promote Patient Goals: unable to state due to cognitive impairment:  Patient will increase standing tolerance to 3 minutes during ADL task to decrease assistance level and decrease fall risk; Patient will increase bed mobility to supervision in preparation for ADLS and transfers; Patient will increase functional mobility to and from bathroom with rolling walker with mod assist to increase performance with ADLS and to use a toilet; Patient will tolerate 10 minutes of UE ROM/strengthening to increase general activity tolerance and performance in ADLS/IADLS; Patient will improve functional activity tolerance to 10 minutes of sustained functional tasks to increase participation in basic self-care and decrease assistance level;  Patient will increase dynamic sitting balance to fair to improve the ability to sit at edge of bed or on a chair for ADLS;  Patient will increase dynamic standing balance to poor+ to improve postural stability and decrease fall risk during standing ADLS and transfers     LTG Time Frame   (8-14 days)   Long Term Goal Patient will increase standing tolerance to 6 minutes during ADL task to decrease assistance level and decrease fall risk; Patient will increase bed mobility to independent in preparation for ADLS and transfers; Patient will increase functional mobility to and from bathroom with rolling walker with min assist to increase performance with ADLS and to use a toilet; Patient will tolerate 20 minutes of UE ROM/strengthening to increase general activity tolerance and performance in ADLS/IADLS; Patient will improve functional activity tolerance to 20 minutes of sustained functional tasks to increase participation in basic self-care and decrease assistance level;  Patient will increase dynamic sitting balance to fair+ to improve the ability to sit at edge of bed or on a chair for ADLS;  Patient will increase dynamic standing balance to fair- to improve postural stability and decrease fall risk during standing ADLS and transfers  Pt will score >/= 12/24 on AM-PAC Daily Activity Inpatient scale to promote safe independence with ADLs and functional mobility; Pt will score >/= 50/100 on Barthel Index in order to decrease caregiver assistance needed and increase ability to perform ADLs and functional mobility  Functional Transfer Goals   Pt Will Perform All Functional Transfers   (STG min assist LTG supervision)   ADL Goals   Pt Will Perform Eating   (STG min assist LTG supervision)   Pt Will Perform Grooming   (STG min assist LTG supervision)   Pt Will Perform Bathing   (STG Mod assist LTG supervision)   Pt Will Perform UE Dressing   (STG Mod assist LTG supervision)   Pt Will Perform LE Dressing   (STG Mod assist LTG supervision)   Pt Will Perform Toileting   (STG Mod assist LTG supervision)   Plan   Treatment Interventions ADL retraining;Functional transfer training;UE strengthening/ROM; Endurance training;Cognitive reorientation;Patient/family training;Equipment evaluation/education; Activityengagement; Compensatory technique education   Goal Expiration Date 10/14/22   OT Frequency 3-5x/wk   Recommendation   OT Discharge Recommendation Post acute rehabilitation services   AM-PAC Daily Activity Inpatient   Lower Body Dressing 1   Bathing 1   Toileting 1   Upper Body Dressing 1   Grooming 2   Eating 2   Daily Activity Raw Score 8   Turning Head Towards Sound 3   Follow Simple Instructions 3   Low Function Daily Activity Raw Score 14   Low Function Daily Activity Standardized Score 24 79   AM-PAC Applied Cognition Inpatient   Following a Speech/Presentation 2   Understanding Ordinary Conversation 3   Taking Medications 1   Remembering Where Things Are Placed or Put Away 1   Remembering List of 4-5 Errands 1   Taking Care of Complicated Tasks 1   Applied Cognition Raw Score 9   Applied Cognition Standardized Score 22 48   Barthel Index   Feeding 5   Bathing 0   Grooming Score 0   Dressing Score 0   Bladder Score 0   Bowels Score 5   Toilet Use Score 5   Transfers (Bed/Chair) Score 5   Mobility (Level Surface) Score 0   Stairs Score 0   Barthel Index Score 20   Licensure   [place] License Number  Gregory Valle Drake 87 OTR/L [id number]

## 2022-09-30 NOTE — PROGRESS NOTES
[facility]  Progress Note - Paty Favor 1945, 68 y o  female MRN: [id number]  Unit/Bed#: [address] [place] [address] Encounter: [id number]  Primary Care Provider: Desiree Pearson MD   Date and time admitted to hospital: 9/28/2022  5:56 PM    Infestation by bed bug  Assessment & Plan  Decontaminated in ED    Dementia with behavioral disturbance, unspecified dementia type [facility])  Assessment & Plan  Pt lives alone, daughter concerned she is unable to care for herself  · No longer on medication  · CM consult    Rash and nonspecific skin eruption  Assessment & Plan  Patient with rash over body that has been presents for a few weeks  · Seen by dermatologist and told she has sulfa allergy, reaction to torsemide  · Hydrocortisone cream, benadryl prn    CKD (chronic kidney disease) stage 4, GFR 15-29 ml/min [facility])  Assessment & Plan  Lab Results   Component Value Date    EGFR 28 09/29/2022    EGFR 27 09/28/2022    EGFR 23 02/26/2022    CREATININE 1 73 (H) 09/29/2022    CREATININE 1 76 (H) 09/28/2022    CREATININE 2 01 (H) 02/26/2022     Creatinine at baseline, monitor with BMP    Coronary artery disease involving native coronary artery of native heart without angina pectoris  Assessment & Plan  Asymptomatic,  S/p PCI in 2012 with NARGIS to RCA, 2013 NARGIS to LAD    · Troponin trend negative  · Continue metoprolol    Essential hypertension  Assessment & Plan  Chronic, Continue metoprolol 25mg bid    * Acute exacerbation of CHF (congestive heart failure) ([facility] [address] )  Assessment & Plan  Wt Readings from Last 3 Encounters:   09/29/22 93 8 kg (206 lb 12 7 oz)   09/02/22 89 4 kg (197 lb)   03/11/22 86 2 kg (190 lb)     Pt with shortness of breath, leg edema  · Was previously on torsemide 20mg but stopped secondary to drug reaction, has been on ethacrynic acid for 5 days with no improvement, currently on Bumex   Echo shows EF 46%, normal diastolic and systolic function, moderate tricuspid regurg   BNP 10,385   Troponin trend negative   Start bumex 1g bid   Daily weights, I/Os   Fluid, sodium restriction              VTE Pharmacologic Prophylaxis: VTE Score: 4 Moderate Risk (Score 3-4) - Pharmacological DVT Prophylaxis Ordered: heparin  Patient Centered Rounds: I performed bedside rounds with nursing staff today  Discussions with Specialists or Other Care Team Provider:     Education and Discussions with Family / Patient: Updated  (friend) at bedside  Time Spent for Care: 30 minutes  More than 50% of total time spent on counseling and coordination of care as described above  Current Length of Stay: 0 day(s)  Current Patient Status: Inpatient   Certification Statement: The patient will continue to require additional inpatient hospital stay due to Clinical course  Discharge Plan: Anticipate discharge in 24-48 hrs to home  Code Status: Level 1 - Full Code    Subjective:   Patient seen and examined at bedside with friend  Patient had some  confusion but was alert  RN reported no concerns overnight Patient friend  had some concerns about the patient living alone  and inability to take care self  In addition urine he    Objective:     Vitals:   Temp (24hrs), Av 4 °F (36 9 °C), Min:97 8 °F (36 6 °C), Max:98 8 °F (37 1 °C)    Temp:  [97 8 °F (36 6 °C)-98 8 °F (37 1 °C)] 98 °F (36 7 °C)  HR:  [77-91] 81  Resp:  [16-44] 18  BP: (122-159)/(55-72) 122/55  SpO2:  [94 %-97 %] 95 %  Body mass index is 32 39 kg/m²  Input and Output Summary (last 24 hours): Intake/Output Summary (Last 24 hours) at 20228  Last data filed at 2022 1500  Gross per 24 hour   Intake --   Output 1350 ml   Net -1350 ml       Physical Exam  Vitals and nursing note reviewed  Constitutional:       General: She is not in acute distress  Appearance: She is well-developed  HENT:      Head: Normocephalic and atraumatic     Eyes:      Conjunctiva/sclera: Conjunctivae normal    Cardiovascular:      Rate and Rhythm: Normal rate and regular rhythm  Heart sounds: No murmur heard  Pulmonary:      Effort: Pulmonary effort is normal  No respiratory distress  Breath sounds: Normal breath sounds  Abdominal:      Palpations: Abdomen is soft  Tenderness: There is no abdominal tenderness  Musculoskeletal:      Cervical back: Neck supple  Skin:     General: Skin is warm and dry  Neurological:      Mental Status: She is alert  Additional Data:     Labs:  Results from last 7 days   Lab Units 09/29/22  0523   WBC Thousand/uL 6 43   HEMOGLOBIN g/dL 11 5   HEMATOCRIT % 36 7   PLATELETS Thousands/uL 170   NEUTROS PCT % 74   LYMPHS PCT % 13*   MONOS PCT % 11   EOS PCT % 1     Results from last 7 days   Lab Units 09/29/22  0523 09/28/22  1848   SODIUM mmol/L 141 142   POTASSIUM mmol/L 4 0 4 4   CHLORIDE mmol/L 106 106   CO2 mmol/L 26 28   BUN mg/dL 19 21   CREATININE mg/dL 1 73* 1 76*   ANION GAP mmol/L 9 8   CALCIUM mg/dL 8 3 8 7   ALBUMIN g/dL  --  3 4*   TOTAL BILIRUBIN mg/dL  --  0 61   ALK PHOS U/L  --  86   ALT U/L  --  16   AST U/L  --  19   GLUCOSE RANDOM mg/dL 99 88                       Lines/Drains:  Invasive Devices  Report    Peripheral Intravenous Line  Duration           Peripheral IV 09/29/22 Right;Ventral (anterior) Wrist <1 day                  Telemetry:  Telemetry Orders (From admission, onward)             48 Hour Telemetry Monitoring  Continuous x 48 hours        References:    Telemetry Guidelines   Question:  Reason for 48 Hour Telemetry  Answer:  Acute Decompensated CHF (continuous diuretic infusion or total diuretic dose > 200 mg daily, associated electrolyte derangement, ionotropic drip, history of ventricular arrhythmia, or new EF <35%)                 Telemetry Reviewed: Normal Sinus Rhythm  Indication for Continued Telemetry Use: No indication for continued use  Will discontinue                Imaging: Reviewed radiology reports from this admission including: Doppler    Recent Cultures (last 7 days):         Last 24 Hours Medication List:   Current Facility-Administered Medications   Medication Dose Route Frequency Provider Last Rate    acetaminophen  650 mg Oral Q6H PRN Cathern Pap, PA-C      aspirin  81 mg Oral Daily Solange Vecellio, PA-C      bumetanide  1 mg Intravenous Q12H Solange Vecellio, PA-C      diphenhydrAMINE  25 mg Intravenous Q6H PRN Cathern Pap, PA-C      heparin (porcine)  5,000 Units Subcutaneous Q8H [address] Solange Vecellio, PA-C      hydrocortisone   Topical 4x Daily PRN Cathern Pap, PA-C      ipratropium  2 spray Nasal Q12H Solange Vecellio, PA-C      metoprolol tartrate  25 mg Oral BID Cathern Pap, PA-C      ondansetron  4 mg Intravenous Q6H PRN Cathern Pap, PA-C      traMADol  50 mg Oral Q6H PRN Cathern Pap, PA-C          Today, Patient Was Seen By: Travon Gerardo    **Please Note: This note may have been constructed using a voice recognition system  **

## 2022-09-30 NOTE — CASE MANAGEMENT
Case Management Discharge Planning Note    Patient name Faby Alvarado  Location [address] [place] [address]-* MRN [id number]  : 1945 Date 2022       Current Admission Date: 2022  Current Admission Diagnosis:Acute exacerbation of CHF (congestive heart failure) [facility])   Patient Active Problem List    Diagnosis Date Noted    Sinoatrial node tachycardia 2022    Infestation by bed bug 2022    Rash and nonspecific skin eruption 2022    Dementia with behavioral disturbance, unspecified dementia type ([place] [id number] ) 2022    Chronic obstructive pulmonary disease, unspecified COPD type ([place] [address] ) 2022    Acute exacerbation of CHF (congestive heart failure) ([place] [address] ) 2022    Vasomotor rhinitis 2022    Left ovarian cyst 2022    Stage 3a chronic kidney disease ([place] [address] ) 2022    Asymptomatic microscopic hematuria 2022    Chest pain 2021    CKD (chronic kidney disease) stage 4, GFR 15-29 ml/min ([place] [address] ) 2021    Abdominal aortic aneurysm (AAA) without rupture ([place] [address] ) 2020    Belching 2020    Dyspepsia 2020    Colon cancer screening 2020    Nausea 2020    Essential hypertension     Coronary artery disease involving native coronary artery of native heart without angina pectoris     Arthritis       LOS (days): 1  Geometric Mean LOS (GMLOS) (days): 3 00  Days to GMLOS:2 3     OBJECTIVE:  Risk of Unplanned Readmission Score: 12 28         Current admission status: Inpatient   Preferred Pharmacy:   [address], [address] [place] [address] [address]  [address] [place] [address]  [id number]  Phone: [phone] Fax: [phone]    Primary Care Provider: Octavio Jiménez MD    Primary Insurance: MEDICARE  Secondary Insurance: COMMERCIAL MISCELLANEOUS    DISCHARGE DETAILS:    Discharge planning discussed with[de-identified] daughter-Lisa  Freedom of Choice: Yes  Comments - Freedom of Choice: FOC discussed regarding referrals placed to STR  Daughter would like to [facility] bed [facility] at this time  CM confirmed with Arianne Lopez in Admissions, a bed will be available this weekend    CM contacted family/caregiver?: Yes  Were Treatment Team discharge recommendations reviewed with patient/caregiver?: Yes  Did patient/caregiver verbalize understanding of patient care needs?: N/A- going to facility  Were patient/caregiver advised of the risks associated with not following Treatment Team discharge recommendations?: Yes    Contacts  [facility]: [facility]  Relationship to Patient[de-identified] Family  Contact Method: Phone  Phone Number: [phone]  Reason/Outcome: Continuity of Care, Discharge Planning    Other Referral/Resources/Interventions Provided:  Interventions: Short Term Rehab  Referral Comments: CM reserved [address] in [address] Team Recommendation: Short Term Rehab  Discharge Destination Plan[de-identified] Short Term Rehab     IMM Given (Date):: 09/30/22  IMM Given to[de-identified] Family  Family notified[de-identified] reviewed with daughterJavier

## 2022-09-30 NOTE — CONSULTS
Consultation - Cardiology   Mian Olivo 68 y o  female MRN: [id number]  Unit/Bed#: [address] [place] [address] Encounter: [id number]    Assessment/Plan     Assessment:  1  New onset atrial fibrillation  2  Acute on chronic diastolic heart failure  3  Hypertension  4  Dementia  5  Coronary artery disease:  History of RCA stent in 2012 and LAD stent in 2013  6  Infestation with bedbugs/rash  7  Chronic kidney disease stage 3      Plan:  Patient has been admitted to the service of [facility]  1  Will discontinue IV Bumex and transition patient back torsemide 20 mg daily with close monitoring on skin regarding rash  2  Lopressor was increased to 50 mg b i d  Today per primary team this morning, will check TSH to rule out hyperthyroidism as possible cause  3  Previous echocardiogram demonstrates severely dilated atria with elevated PA pressures  Will discuss with family rate verses rhythm control strategy  4  CHADS2 Vasc score = 6 (CAD, hypertension CHF, female and age), or 9 7% risk of stroke per year  Will start patient on Eliquis 5 mg b i d     5  Continue telemetry      History of Present Illness   Physician Requesting Consult: Sana Irene MD  Reason for Consult / Principal Problem:  Lower extremity edema      HPI: Mian Olivo is a 68y o  year old female who presented to the emergency room with her family who brought her in for complaints of lower extremity edema and pain in bilateral thighs  Patient also has had a persistent rash over her body for the past few weeks  It was initially thought to be secondary to sulfa allergy, but it is documented in the emergency room that patient was decontaminated secondary to bug bites  Patient does have multiple small petechial bites on upper arms, across chest and back and buttock area  History is obtained from patient's chart as she is a poor historian    Patient follows with Dr Gayatri Go for history of COPD, CHF, hypertension, coronary artery disease with 2 vessel stenting, chronic kidney disease and hypertension  At approximately 6:00 a m  This morning patient was noted to convert to a rapid atrial fibrillation on telemetry monitoring  Outside chart reviewed and there does not appear to be any history of atrial fibrillation in the past, initial 12 lead EKG did show sinus rhythm with frequent PACs and PVCs  Echocardiogram performed in March of 2022 demonstrated LVH with an EF of 55%, patient had severely dilated left atrium and moderately dilated right atrium  She had mild-to-moderate mitral valve regurgitation and moderate tricuspid valve regurgitation with estimated peak PA pressure of 42 mm Hg  At this time patient is states no to questions regarding review of systems, she denies chest pain, shortness a breath or palpitations  Patient lives alone but does have a good friend back comes over frequently to check on her  Per her friend, patient is able to do her own cooking  Inpatient consult to Cardiology  Consult performed by: BRADLY Britt  Consult ordered by: Dasia Francis MD          Review of Systems   Constitutional: Positive for activity change and fatigue  Negative for appetite change, diaphoresis and fever  HENT: Negative for congestion, facial swelling, rhinorrhea and tinnitus  Eyes: Negative  Negative for photophobia and visual disturbance  Respiratory: Positive for cough and shortness of breath  Negative for chest tightness  Cardiovascular: Negative for chest pain and leg swelling  Gastrointestinal: Negative  Negative for abdominal distention, constipation, diarrhea, nausea and vomiting  Endocrine: Negative  Negative for polydipsia, polyphagia and polyuria  Genitourinary: Negative  Negative for difficulty urinating  Musculoskeletal: Negative  Skin: Negative  Neurological: Negative  Negative for dizziness, syncope, weakness and light-headedness  Hematological: Negative      Psychiatric/Behavioral: Negative  Historical Information   Past Medical History:   Diagnosis Date    Arthritis     CHF (congestive heart failure) ([address] )     [facility]     Coronary artery disease     Hypertension      History reviewed  No pertinent surgical history  Social History     Substance and Sexual Activity   Alcohol Use Never     Social History     Substance and Sexual Activity   Drug Use Never     E-Cigarette/Vaping    E-Cigarette Use Never User      E-Cigarette/Vaping Substances     Social History     Tobacco Use   Smoking Status Former Smoker    Types: Cigarettes   Smokeless Tobacco Never Used     Family History: History reviewed  No pertinent family history  Meds/Allergies   all current active meds have been reviewed, current meds:   Current Facility-Administered Medications   Medication Dose Route Frequency    acetaminophen (TYLENOL) tablet 650 mg  650 mg Oral Q6H PRN    aspirin chewable tablet 81 mg  81 mg Oral Daily    bumetanide (BUMEX) injection 1 mg  1 mg Intravenous Q12H    heparin (porcine) subcutaneous injection 5,000 Units  5,000 Units Subcutaneous Q8H [facility]    hydrocortisone 1 % ointment   Topical 4x Daily PRN    ipratropium (ATROVENT) 0 03 % nasal spray 2 spray  2 spray Nasal Q12H    metoprolol tartrate (LOPRESSOR) tablet 50 mg  50 mg Oral BID    ondansetron (ZOFRAN) injection 4 mg  4 mg Intravenous Q6H PRN    traMADol (ULTRAM) tablet 50 mg  50 mg Oral Q6H PRN    and PTA meds:   Prior to Admission Medications   Prescriptions Last Dose Informant Patient Reported?  Taking?   aspirin 81 mg chewable tablet Unknown at Unknown time  Yes No   Sig: Chew 81 mg daily   ethacrynic acid (EDECRIN) 25 mg tablet Unknown at Unknown time  Yes No   Sig: Take 25 mg by mouth daily   ipratropium (ATROVENT) 0 03 % nasal spray Unknown at Unknown time  No No   Si sprays into each nostril every 12 (twelve) hours   metoprolol tartrate (LOPRESSOR) 25 mg tablet Unknown at Unknown time  Yes No   Sig: Take 25 mg by mouth 2 (two) times a day      Facility-Administered Medications: None     Allergies   Allergen Reactions    Bee Pollen Allergic Rhinitis     Runny nose    Pollen Extract      Runny nose    Torsemide Rash       Objective   Vitals: Blood pressure 144/90, pulse (!) 121, temperature 98 2 °F (36 8 °C), temperature source Oral, resp  rate 20, height 5' 7" (1 702 m), weight 90 7 kg (200 lb), SpO2 91 %, not currently breastfeeding  Orthostatic Blood Pressures    Flowsheet Row Most Recent Value   Blood Pressure 144/90 filed at 09/30/2022 1083   Patient Position - Orthostatic VS Lying filed at 09/30/2022 0751            Intake/Output Summary (Last 24 hours) at 9/30/2022 1152  Last data filed at 9/30/2022 0751  Gross per 24 hour   Intake 170 ml   Output 1050 ml   Net -880 ml       Invasive Devices  Report    Peripheral Intravenous Line  Duration           Peripheral IV 09/30/22 Right;Ventral (anterior) Forearm <1 day                Physical Exam  Vitals and nursing note reviewed  Constitutional:       Appearance: Normal appearance  She is obese  HENT:      Right Ear: External ear normal       Left Ear: External ear normal       Mouth/Throat:      Pharynx: No oropharyngeal exudate or posterior oropharyngeal erythema  Eyes:      General: No scleral icterus  Right eye: No discharge  Left eye: No discharge  Cardiovascular:      Rate and Rhythm: Normal rate  Rhythm irregular  Pulses: Normal pulses  Heart sounds: Murmur heard  Pulmonary:      Effort: Pulmonary effort is normal  No accessory muscle usage or respiratory distress  Breath sounds: Examination of the right-lower field reveals decreased breath sounds  Examination of the left-lower field reveals decreased breath sounds  Decreased breath sounds present  Abdominal:      General: Bowel sounds are normal  There is no distension  Palpations: Abdomen is soft     Musculoskeletal: Right lower leg: Edema present  Left lower leg: Edema present  Comments: Trace to +1 pedal and ankle edema   Skin:     General: Skin is warm and dry  Capillary Refill: Capillary refill takes less than 2 seconds  Neurological:      General: No focal deficit present  Mental Status: She is alert  Mental status is at baseline  Psychiatric:         Mood and Affect: Affect is blunt and flat  Behavior: Behavior is cooperative  Cognition and Memory: Cognition is impaired  Lab Results:   I have personally reviewed pertinent lab results  CBC with diff:   Results from last 7 days   Lab Units 09/29/22  0523   WBC Thousand/uL 6 43   RBC Million/uL 3 70*   HEMOGLOBIN g/dL 11 5   HEMATOCRIT % 36 7   MCV fL 99*   MCH pg 31 1   MCHC g/dL 31 3*   RDW % 16 2*   MPV fL 11 0   PLATELETS Thousands/uL 170     CMP:   Results from last 7 days   Lab Units 09/29/22  0523 09/28/22  1848   SODIUM mmol/L 141 142   CHLORIDE mmol/L 106 106   CO2 mmol/L 26 28   BUN mg/dL 19 21   CREATININE mg/dL 1 73* 1 76*   CALCIUM mg/dL 8 3 8 7   AST U/L  --  19   ALT U/L  --  16   ALK PHOS U/L  --  86   EGFR ml/min/1 73sq m 28 27     HS Troponin:   0   Lab Value Date/Time    HSTNI0 11 09/28/2022 1848    HSTNI2 11 09/28/2022 2034    HSTNI4 10 09/28/2022 2308     BNP:   Results from last 7 days   Lab Units 09/29/22  0523   POTASSIUM mmol/L 4 0   CHLORIDE mmol/L 106   CO2 mmol/L 26   BUN mg/dL 19   CREATININE mg/dL 1 73*   CALCIUM mg/dL 8 3   EGFR ml/min/1 73sq m 28     Coags:     TSH:   pending  Magnesium:     Lipid Profile:     Imaging: I have personally reviewed pertinent reports      EKG:  Admission EKG demonstrated sinus rhythm with frequent PACs and PVCs  VTE Prophylaxis: Sequential compression device González Huntley)     Code Status: Level 1 - Full Code  Advance Directive and Living Will:      Power of :    POLST:      Klarissa Partida  Cardiology

## 2022-10-01 PROBLEM — F03.918 DEMENTIA WITH BEHAVIORAL DISTURBANCE: Status: ACTIVE | Noted: 2022-09-02

## 2022-10-01 LAB
ALBUMIN SERPL BCP-MCNC: 3.4 G/DL (ref 3.5–5)
ALP SERPL-CCNC: 84 U/L (ref 46–116)
ALT SERPL W P-5'-P-CCNC: 24 U/L (ref 12–78)
ANION GAP SERPL CALCULATED.3IONS-SCNC: 11 MMOL/L (ref 4–13)
AST SERPL W P-5'-P-CCNC: 66 U/L (ref 5–45)
BASOPHILS # BLD AUTO: 0.03 THOUSANDS/ΜL (ref 0–0.1)
BASOPHILS NFR BLD AUTO: 0 % (ref 0–1)
BILIRUB SERPL-MCNC: 0.86 MG/DL (ref 0.2–1)
BUN SERPL-MCNC: 25 MG/DL (ref 5–25)
CALCIUM ALBUM COR SERPL-MCNC: 9.3 MG/DL (ref 8.3–10.1)
CALCIUM SERPL-MCNC: 8.8 MG/DL (ref 8.3–10.1)
CHLORIDE SERPL-SCNC: 100 MMOL/L (ref 96–108)
CO2 SERPL-SCNC: 27 MMOL/L (ref 21–32)
CREAT SERPL-MCNC: 1.8 MG/DL (ref 0.6–1.3)
EOSINOPHIL # BLD AUTO: 0.01 THOUSAND/ΜL (ref 0–0.61)
EOSINOPHIL NFR BLD AUTO: 0 % (ref 0–6)
ERYTHROCYTE [DISTWIDTH] IN BLOOD BY AUTOMATED COUNT: 16.1 % (ref 11.6–15.1)
GFR SERPL CREATININE-BSD FRML MDRD: 26 ML/MIN/1.73SQ M
GLUCOSE SERPL-MCNC: 127 MG/DL (ref 65–140)
HCT VFR BLD AUTO: 42 % (ref 34.8–46.1)
HGB BLD-MCNC: 13.6 G/DL (ref 11.5–15.4)
IMM GRANULOCYTES # BLD AUTO: 0.03 THOUSAND/UL (ref 0–0.2)
IMM GRANULOCYTES NFR BLD AUTO: 0 % (ref 0–2)
LYMPHOCYTES # BLD AUTO: 0.73 THOUSANDS/ΜL (ref 0.6–4.47)
LYMPHOCYTES NFR BLD AUTO: 10 % (ref 14–44)
MCH RBC QN AUTO: 31.3 PG (ref 26.8–34.3)
MCHC RBC AUTO-ENTMCNC: 32.4 G/DL (ref 31.4–37.4)
MCV RBC AUTO: 97 FL (ref 82–98)
MONOCYTES # BLD AUTO: 0.51 THOUSAND/ΜL (ref 0.17–1.22)
MONOCYTES NFR BLD AUTO: 7 % (ref 4–12)
NEUTROPHILS # BLD AUTO: 5.7 THOUSANDS/ΜL (ref 1.85–7.62)
NEUTS SEG NFR BLD AUTO: 83 % (ref 43–75)
NRBC BLD AUTO-RTO: 0 /100 WBCS
PLATELET # BLD AUTO: 197 THOUSANDS/UL (ref 149–390)
PMV BLD AUTO: 10.6 FL (ref 8.9–12.7)
POTASSIUM SERPL-SCNC: 3.9 MMOL/L (ref 3.5–5.3)
PROT SERPL-MCNC: 7.6 G/DL (ref 6.4–8.4)
RBC # BLD AUTO: 4.35 MILLION/UL (ref 3.81–5.12)
SODIUM SERPL-SCNC: 138 MMOL/L (ref 135–147)
WBC # BLD AUTO: 7.01 THOUSAND/UL (ref 4.31–10.16)

## 2022-10-01 PROCEDURE — 99232 SBSQ HOSP IP/OBS MODERATE 35: CPT | Performed by: STUDENT IN AN ORGANIZED HEALTH CARE EDUCATION/TRAINING PROGRAM

## 2022-10-01 PROCEDURE — 99233 SBSQ HOSP IP/OBS HIGH 50: CPT | Performed by: INTERNAL MEDICINE

## 2022-10-01 PROCEDURE — 85025 COMPLETE CBC W/AUTO DIFF WBC: CPT | Performed by: STUDENT IN AN ORGANIZED HEALTH CARE EDUCATION/TRAINING PROGRAM

## 2022-10-01 PROCEDURE — 80053 COMPREHEN METABOLIC PANEL: CPT | Performed by: STUDENT IN AN ORGANIZED HEALTH CARE EDUCATION/TRAINING PROGRAM

## 2022-10-01 RX ORDER — BUMETANIDE 0.25 MG/ML
1 INJECTION, SOLUTION INTRAMUSCULAR; INTRAVENOUS DAILY
Status: DISCONTINUED | OUTPATIENT
Start: 2022-10-01 | End: 2022-10-02

## 2022-10-01 RX ORDER — HALOPERIDOL 5 MG/ML
2 INJECTION INTRAMUSCULAR ONCE
Status: COMPLETED | OUTPATIENT
Start: 2022-10-01 | End: 2022-10-01

## 2022-10-01 RX ORDER — DIPHENHYDRAMINE HYDROCHLORIDE 50 MG/ML
12.5 INJECTION INTRAMUSCULAR; INTRAVENOUS EVERY 6 HOURS PRN
Status: DISCONTINUED | OUTPATIENT
Start: 2022-10-01 | End: 2022-10-02

## 2022-10-01 RX ORDER — BUMETANIDE 0.25 MG/ML
1 INJECTION, SOLUTION INTRAMUSCULAR; INTRAVENOUS ONCE
Status: DISCONTINUED | OUTPATIENT
Start: 2022-10-02 | End: 2022-10-01

## 2022-10-01 RX ORDER — ZIPRASIDONE HYDROCHLORIDE 20 MG/1
20 CAPSULE ORAL 2 TIMES DAILY PRN
Status: DISCONTINUED | OUTPATIENT
Start: 2022-10-01 | End: 2022-10-02

## 2022-10-01 RX ORDER — HALOPERIDOL 1 MG/1
2 TABLET ORAL ONCE
Status: DISCONTINUED | OUTPATIENT
Start: 2022-10-01 | End: 2022-10-01

## 2022-10-01 RX ORDER — BUMETANIDE 0.25 MG/ML
1 INJECTION, SOLUTION INTRAMUSCULAR; INTRAVENOUS ONCE
Status: COMPLETED | OUTPATIENT
Start: 2022-10-01 | End: 2022-10-01

## 2022-10-01 RX ADMIN — METOPROLOL TARTRATE 50 MG: 50 TABLET, FILM COATED ORAL at 09:30

## 2022-10-01 RX ADMIN — DIPHENHYDRAMINE HYDROCHLORIDE 12.5 MG: 50 INJECTION, SOLUTION INTRAMUSCULAR; INTRAVENOUS at 21:52

## 2022-10-01 RX ADMIN — APIXABAN 5 MG: 5 TABLET, FILM COATED ORAL at 09:30

## 2022-10-01 RX ADMIN — ASPIRIN 81 MG CHEWABLE TABLET 81 MG: 81 TABLET CHEWABLE at 08:30

## 2022-10-01 RX ADMIN — BUMETANIDE 1 MG: 0.25 INJECTION INTRAMUSCULAR; INTRAVENOUS at 11:40

## 2022-10-01 RX ADMIN — TRAMADOL HYDROCHLORIDE 50 MG: 50 TABLET, COATED ORAL at 12:56

## 2022-10-01 RX ADMIN — METOPROLOL TARTRATE 50 MG: 50 TABLET, FILM COATED ORAL at 21:52

## 2022-10-01 RX ADMIN — ZIPRASIDONE HYDROCHLORIDE 20 MG: 20 CAPSULE ORAL at 21:52

## 2022-10-01 RX ADMIN — HALOPERIDOL LACTATE 2 MG: 5 INJECTION, SOLUTION INTRAMUSCULAR at 02:28

## 2022-10-01 RX ADMIN — BUMETANIDE 1 MG: 0.25 INJECTION INTRAMUSCULAR; INTRAVENOUS at 13:00

## 2022-10-01 RX ADMIN — APIXABAN 5 MG: 5 TABLET, FILM COATED ORAL at 17:48

## 2022-10-01 NOTE — ASSESSMENT & PLAN NOTE
Wt Readings from Last 3 Encounters:   10/07/22 85 4 kg (188 lb 4 8 oz)   09/02/22 89 4 kg (197 lb)   03/11/22 86 2 kg (190 lb)     Acute CHF likely Diastolic in the setting of Hypertension, AFib evidenced by BNP 10,385, SOB and leg edema, treated with IV Bumex BID, daily weights, I&Os and fluid restriction  Was previously on torsemide 20mg but stopped secondary to drug reaction (?not a true reaction), has been on ethacrynic acid for 5 days with no improvement,  Echo EF 50%,Left Atrium: The atrium is severely dilated (>48 mL/m2)  Right Atrium:   Mitral Valve: There is moderate regurgitation  Tricuspid/mitral Valve:  moderate regurgitation  Weight and volume status improved  Currently euvolemic    · Continue metoprolol  · Not on Ace/ARB/Aldactone secondary to CKD  · Continue Bumex 1 mg daily  · Monitor BMP  · Daily weight, intake output

## 2022-10-01 NOTE — ASSESSMENT & PLAN NOTE
Asymptomatic,  S/p PCI in 2012 with NARGIS to RCA, 2013 NARGIS to LAD    · Troponin trend negative  · Continue aspirin, metoprolol

## 2022-10-01 NOTE — ASSESSMENT & PLAN NOTE
Improving,  Patient with rash over body that has been presents for a few weeks  · Seen by dermatologist and told she has sulfa allergy, reaction to torsemide, ?   Related to 2/2 Bed bugs  · Hydrocortisone cream

## 2022-10-01 NOTE — ASSESSMENT & PLAN NOTE
Pt lives alone, daughter concerned she is unable to care for herself  · No longer on medication  · Supervised living, discharge planning to skilled nursing facility

## 2022-10-01 NOTE — PROGRESS NOTES
Daily Cardiology Progress Note              LOS: 2 days     Assessment/Plan     Principal Problem:    Acute exacerbation of CHF (congestive heart failure) ([facility])  Active Problems:    Essential hypertension    Coronary artery disease involving native coronary artery of native heart without angina pectoris    CKD (chronic kidney disease) stage 4, GFR 15-29 ml/min ([facility])    Rash and nonspecific skin eruption    Dementia with behavioral disturbance, unspecified dementia type    Infestation by bed bug    Sinoatrial node tachycardia    1  Newly diagnosed atrial fibrillation with persistently elevated ventricular rate  - She has been on cardizem gtt at 5 mg/hr along with metoprolol  HR continues to be elevated  Will increase to 10 mg/hr   - Continue metoprolol 50 mg bid  - Continue Eliquis  - If unable to rate control, will attempt cardioversion on Monday  2  Acute on chronic diastolic CHF  - Likely exacerbated by tachycardia  - Restart on IV diuretics - given Bumex this AM       3  Hypertension  - BP stable with cardizem gtt and metoprolol    4  Dementia  5  CAD with prior PCI of RCA and LAD  - No angina at this time  - Continue metoprolol  - Continue Eliquis  Subjective     Interval History: Patient's heart rate remains elevated  She denies any chest pain or palpitations  Objective     Vital signs in last 24 hours:  Temp:  [97 7 °F (36 5 °C)-99 2 °F (37 3 °C)] 98 °F (36 7 °C)  HR:  [] 106  Resp:  [18-19] 19  BP: ()/(50-89) 135/89  Weight (last 2 days)     Date/Time Weight    09/30/22 1320 90 7 (200)    09/30/22 0600 90 7 (200)    09/30/22 0125 90 7 (200)    09/29/22 0600 93 8 (206 79)    09/29/22 0002 93 8 (206 79)           Intake/Output last 3 shifts:  I/O last 3 completed shifts: In: 170 [P O :150; I V :20]  Out: 1000 [Urine:1000]  Intake/Output this shift:  No intake/output data recorded  Physical Exam:  Physical Exam   Constitutional: She appears healthy  No distress     Eyes: Pupils are equal, round, and reactive to light  Conjunctivae are normal    Neck: No JVD present  Cardiovascular: Normal heart sounds  An irregularly irregular rhythm present  Tachycardia present  Exam reveals no gallop and no friction rub  No murmur heard  Pulmonary/Chest: Effort normal and breath sounds normal  She has no wheezes  She has no rales  Musculoskeletal:         General: No tenderness, deformity or edema  Cervical back: Normal range of motion and neck supple  Neurological: She is alert and oriented to person, place, and time  Skin: Skin is warm and dry  Lab Results: I have personally reviewed pertinent lab results  Imaging: I have personally reviewed pertinent films in PACS  EKG/Tele: Reviewed    Atrial fibrillation with RVR

## 2022-10-01 NOTE — ASSESSMENT & PLAN NOTE
Status post Cardizem, converted to NSR  Cardiology following, input appreciated  · Continue metoprolol   · On Eliquis  · PT/OT

## 2022-10-01 NOTE — PROGRESS NOTES
[facility]  Progress Note - Paty Favor 1945, 68 y o  female MRN: [id number]  Unit/Bed#: [address] [place] [address] Encounter: [id number]  Primary Care Provider: Desiree Pearson MD   Date and time admitted to hospital: 9/28/2022  5:56 PM    Sinoatrial node tachycardia  Assessment & Plan  Unresolved, Asymptomatic,  tele continued  Increase metoprolol to 50 mg B i d  Cardizem drip in place  Cardio  recs: to continue with increased metoprolol initiate anti-coag continue diuresing          * Acute exacerbation of CHF (congestive heart failure) ([facility])  Assessment & Plan  Wt Readings from Last 3 Encounters:   09/30/22 90 7 kg (200 lb)   09/02/22 89 4 kg (197 lb)   03/11/22 86 2 kg (190 lb)     Acute CHF likely Diastolic in the setting of Hypertension evidenced by BNP 10,385, SOB and leg edema, treated with IV Bumex BID, daily weights, I&Os and fluid restriction      · Was previously on torsemide 20mg but stopped secondary to drug reaction, has been on ethacrynic acid for 5 days with no improvement, currently on Bumex   Last Echo shows EF 87%, normal diastolic and systolic function, moderate tricuspid regurg, repeat pending          Dementia with behavioral disturbance  Assessment & Plan  Pt lives alone, daughter concerned she is unable to care for herself  · No longer on medication  · CM consult for long-term placement  · Prn geodon for agitation    Infestation by bed bug  Assessment & Plan  Decontaminated in ED    Rash and nonspecific skin eruption  Assessment & Plan  Improving,  Patient with rash over body that has been presents for a few weeks  · Seen by dermatologist and told she has sulfa allergy, reaction to torsemide, rash actually 2/2 Bed bugs  · Hydrocortisone cream, benadryl prn    CKD (chronic kidney disease) stage 4, GFR 15-29 ml/min [facility])  Assessment & Plan  Lab Results   Component Value Date    EGFR 26 10/01/2022    EGFR 28 09/29/2022    EGFR 27 09/28/2022    CREATININE 1 80 (H) 10/01/2022 CREATININE 1 73 (H) 2022    CREATININE 1 76 (H) 2022     Creatinine at baseline, monitor with BMP  Continue to diurese and replenish to avoid renal damage  Avoid nephrotoxic    Coronary artery disease involving native coronary artery of native heart without angina pectoris  Assessment & Plan  Asymptomatic,  S/p PCI in  with NARGIS to RCA, 2013 NARGIS to LAD    · Troponin trend negative  · Continue metoprolol    Essential hypertension  Assessment & Plan  Chronic, Continue increased metoprolol 50mg bid        VTE Pharmacologic Prophylaxis: VTE Score: 4 Moderate Risk (Score 3-4) - Pharmacological DVT Prophylaxis Ordered: heparin  Patient Centered Rounds: I performed bedside rounds with nursing staff today  Discussions with Specialists or Other Care Team Provider:  Cardiology, case management    Education and Discussions with Family / Patient: Updated  (daughter) via phone  Thad Karimi    Time Spent for Care: 30 minutes  More than 50% of total time spent on counseling and coordination of care as described above  Current Length of Stay: 2 day(s)  Current Patient Status: Inpatient   Certification Statement: The patient will continue to require additional inpatient hospital stay due to Clinical course of care  Discharge Plan: Anticipate discharge in 24-48 hrs to discharge location to be determined pending rehab evaluations  Code Status: Level 1 - Full Code    Subjective:   Patient seen and examined at bedside with daughter present later  Nursing reported patient was agitated overnight , taking off clothes, pulling at lines  Discussed delirium versus dementia with daughter  Daughter expressed that patient's rash was actually from bedbugs verses torsemide which led to admission of fluid overload  Patient's daughter became mostly liable and was reassured of the plan of care and confirmed 1 patient in long-term care due to living alone       Objective:     Vitals:   Temp (24hrs), Av 4 °F (36 9 °C), Min:97 7 °F (36 5 °C), Max:99 2 °F (37 3 °C)    Temp:  [97 7 °F (36 5 °C)-99 2 °F (37 3 °C)] 98 °F (36 7 °C)  HR:  [] 106  Resp:  [18-19] 19  BP: (110-135)/(65-89) 135/89  SpO2:  [93 %] 93 %  Body mass index is 31 32 kg/m²  Input and Output Summary (last 24 hours): Intake/Output Summary (Last 24 hours) at 10/1/2022 1405  Last data filed at 9/30/2022 1858  Gross per 24 hour   Intake --   Output 400 ml   Net -400 ml       Physical Exam:   Physical Exam  Vitals and nursing note reviewed  Constitutional:       General: She is not in acute distress  Appearance: She is well-developed  HENT:      Head: Normocephalic and atraumatic  Eyes:      Conjunctiva/sclera: Conjunctivae normal    Cardiovascular:      Rate and Rhythm: Normal rate and regular rhythm  Heart sounds: No murmur heard  Pulmonary:      Effort: Pulmonary effort is normal  No respiratory distress  Breath sounds: Normal breath sounds  Abdominal:      Palpations: Abdomen is soft  Tenderness: There is no abdominal tenderness  Musculoskeletal:      Cervical back: Neck supple  Skin:     General: Skin is warm and dry  Neurological:      Mental Status: She is alert            Additional Data:     Labs:  Results from last 7 days   Lab Units 10/01/22  1222   WBC Thousand/uL 7 01   HEMOGLOBIN g/dL 13 6   HEMATOCRIT % 42 0   PLATELETS Thousands/uL 197   NEUTROS PCT % 83*   LYMPHS PCT % 10*   MONOS PCT % 7   EOS PCT % 0     Results from last 7 days   Lab Units 10/01/22  1222   SODIUM mmol/L 138   POTASSIUM mmol/L 3 9   CHLORIDE mmol/L 100   CO2 mmol/L 27   BUN mg/dL 25   CREATININE mg/dL 1 80*   ANION GAP mmol/L 11   CALCIUM mg/dL 8 8   ALBUMIN g/dL 3 4*   TOTAL BILIRUBIN mg/dL 0 86   ALK PHOS U/L 84   ALT U/L 24   AST U/L 66*   GLUCOSE RANDOM mg/dL 127                       Lines/Drains:  Invasive Devices  Report    Peripheral Intravenous Line  Duration           Peripheral IV 09/30/22 Right;Ventral (anterior) Forearm 1 day                  Telemetry:  Telemetry Orders (From admission, onward)             48 Hour Telemetry Monitoring  Continuous x 48 hours        References:    Telemetry Guidelines   Question:  Reason for 48 Hour Telemetry  Answer:  Acute Decompensated CHF (continuous diuretic infusion or total diuretic dose > 200 mg daily, associated electrolyte derangement, ionotropic drip, history of ventricular arrhythmia, or new EF <35%)                 Telemetry Reviewed: Sinus Tachycardia  Indication for Continued Telemetry Use: Arrthymias requiring medical therapy             Imaging: Reviewed radiology reports from this admission including: vas    Recent Cultures (last 7 days):         Last 24 Hours Medication List:   Current Facility-Administered Medications   Medication Dose Route Frequency Provider Last Rate    acetaminophen  650 mg Oral Q6H PRN Aniket Rangel PA-C      apixaban  5 mg Oral BID BRADLY Harrell      aspirin  81 mg Oral Daily Solange VecHENRQIUE norris      bumetanide  1 mg Intravenous Daily Yolanda Amaya MD      diltiazem  10 mg/hr Intravenous Continuous Navtej Bryan, DO 10 mg/hr (10/01/22 1249)    diphenhydrAMINE  12 5 mg Intravenous Q6H PRN Yolanda Amaya MD      hydrocortisone   Topical 4x Daily PRN Aniket Rangel PA-C      ipratropium  2 spray Nasal Q12H Aniket Rangel PA-C      metoprolol tartrate  50 mg Oral BID Yolanda Amaya MD      ondansetron  4 mg Intravenous Q6H PRN Aniket Rangel PA-C      traMADol  50 mg Oral Q6H PRN Aniket Rangel PA-C      ziprasidone  20 mg Oral BID PRN Yolanda Amaya MD          Today, Patient Was Seen By: Yolanda Amaya    **Please Note: This note may have been constructed using a voice recognition system  **

## 2022-10-01 NOTE — PLAN OF CARE
Problem: MOBILITY - ADULT  Goal: Maintain or return to baseline ADL function  Description: INTERVENTIONS:  -  Assess patient's ability to carry out ADLs; assess patient's baseline for ADL function and identify physical deficits which impact ability to perform ADLs (bathing, care of mouth/teeth, toileting, grooming, dressing, etc )  - Assess/evaluate cause of self-care deficits   - Assess range of motion  - Assess patient's mobility; develop plan if impaired  - Assess patient's need for assistive devices and provide as appropriate  - Encourage maximum independence but intervene and supervise when necessary  - Involve family in performance of ADLs  - Assess for home care needs following discharge   - Consider OT consult to assist with ADL evaluation and planning for discharge  - Provide patient education as appropriate  Outcome: Progressing  Goal: Maintains/Returns to pre admission functional level  Description: INTERVENTIONS:  - Perform BMAT or MOVE assessment daily    - Set and communicate daily mobility goal to care team and patient/family/caregiver  - Collaborate with rehabilitation services on mobility goals if consulted  - Perform Range of Motion 2 times a day  - Reposition patient every 2 hours    - Dangle patient 22 times a day  - Stand patient 2 times a day  - Ambulate patient 2 times a day  - Out of bed to chair 2 times a day   - Out of bed for meals 2 times a day  - Out of bed for toileting  - Record patient progress and toleration of activity level   Outcome: Progressing     Problem: PAIN - ADULT  Goal: Verbalizes/displays adequate comfort level or baseline comfort level  Description: Interventions:  - Encourage patient to monitor pain and request assistance  - Assess pain using appropriate pain scale  - Administer analgesics based on type and severity of pain and evaluate response  - Implement non-pharmacological measures as appropriate and evaluate response  - Consider cultural and social influences on pain and pain management  - Notify physician/advanced practitioner if interventions unsuccessful or patient reports new pain  Outcome: Progressing     Problem: SAFETY ADULT  Goal: Maintain or return to baseline ADL function  Description: INTERVENTIONS:  -  Assess patient's ability to carry out ADLs; assess patient's baseline for ADL function and identify physical deficits which impact ability to perform ADLs (bathing, care of mouth/teeth, toileting, grooming, dressing, etc )  - Assess/evaluate cause of self-care deficits   - Assess range of motion  - Assess patient's mobility; develop plan if impaired  - Assess patient's need for assistive devices and provide as appropriate  - Encourage maximum independence but intervene and supervise when necessary  - Involve family in performance of ADLs  - Assess for home care needs following discharge   - Consider OT consult to assist with ADL evaluation and planning for discharge  - Provide patient education as appropriate  Outcome: Progressing  Goal: Patient will remain free of falls  Description: INTERVENTIONS:  - Educate patient/family on patient safety including physical limitations  - Instruct patient to call for assistance with activity   - Consult OT/PT to assist with strengthening/mobility   - Keep Call bell within reach  - Keep bed low and locked with side rails adjusted as appropriate  - Keep care items and personal belongings within reach  - Initiate and maintain comfort rounds  - Make Fall Risk Sign visible to staff  - Offer Toileting every 2 Hours, in advance of need  - Initiate/Maintain  bed alarm  - Obtain necessary fall risk management equipment: call bell   - Apply yellow socks and bracelet for high fall risk patients  - Consider moving patient to room near nurses station  Outcome: Progressing     Problem: DISCHARGE PLANNING  Goal: Discharge to home or other facility with appropriate resources  Description: INTERVENTIONS:  - Identify barriers to discharge w/patient and caregiver  - Arrange for needed discharge resources and transportation as appropriate  - Identify discharge learning needs (meds, wound care, etc )  - Arrange for interpretive services to assist at discharge as needed  - Refer to Case Management Department for coordinating discharge planning if the patient needs post-hospital services based on physician/advanced practitioner order or complex needs related to functional status, cognitive ability, or social support system  Outcome: Progressing     Problem: CARDIOVASCULAR - ADULT  Goal: Maintains optimal cardiac output and hemodynamic stability  Description: INTERVENTIONS:  - Monitor I/O, vital signs and rhythm  - Monitor for S/S and trends of decreased cardiac output  - Administer and titrate ordered vasoactive medications to optimize hemodynamic stability  - Assess quality of pulses, skin color and temperature  - Assess for signs of decreased coronary artery perfusion  - Instruct patient to report change in severity of symptoms  Outcome: Progressing  Goal: Absence of cardiac dysrhythmias or at baseline rhythm  Description: INTERVENTIONS:  - Continuous cardiac monitoring, vital signs, obtain 12 lead EKG if ordered  - Administer antiarrhythmic and heart rate control medications as ordered  - Monitor electrolytes and administer replacement therapy as ordered  Outcome: Progressing     Problem: SKIN/TISSUE INTEGRITY - ADULT  Goal: Skin Integrity remains intact(Skin Breakdown Prevention)  Description: Assess:  -Perform Judah assessment every shift   -Clean and moisturize skin every shift   -Inspect skin when repositioning, toileting, and assisting with ADLS  -Assess under medical devices   -Assess extremities for adequate circulation and sensation     Bed Management:  -Have minimal linens on bed & keep smooth, unwrinkled  -Change linens as needed when moist or perspiring  -Avoid sitting or lying in one position for more than 1 hours while in bed  -Keep [facility] at 45degrees     Toileting:  -Offer bedside commode  -Assess for incontinence every shift   -Use incontinent care products after each incontinent episode such as moisturizing wipes    Activity:  -Mobilize patient 1 times a day  -Encourage activity and walks on unit  -Encourage or provide ROM exercises   -Turn and reposition patient every 2 Hours  -Use appropriate equipment to lift or move patient in bed  -Instruct/ Assist with weight shifting every 1hour  when out of bed in chair  -Consider limitation of chair time 1 hour intervals    Skin Care:  -Avoid use of baby powder, tape, friction and shearing, hot water or constrictive clothing  -Relieve pressure over bony prominences   -Do not massage red bony areas    Next Steps:  -Teach patient strategies to minimize risks such as weight shifting    -Consider consults to  interdisciplinary teams such as wound specialist   Outcome: Progressing     Problem: MUSCULOSKELETAL - ADULT  Goal: Maintain or return mobility to safest level of function  Description: INTERVENTIONS:  - Assess patient's ability to carry out ADLs; assess patient's baseline for ADL function and identify physical deficits which impact ability to perform ADLs (bathing, care of mouth/teeth, toileting, grooming, dressing, etc )  - Assess/evaluate cause of self-care deficits   - Assess range of motion  - Assess patient's mobility  - Assess patient's need for assistive devices and provide as appropriate  - Encourage maximum independence but intervene and supervise when necessary  - Involve family in performance of ADLs  - Assess for home care needs following discharge   - Consider OT consult to assist with ADL evaluation and planning for discharge  - Provide patient education as appropriate  Outcome: Progressing     Problem: Potential for Falls  Goal: Patient will remain free of falls  Description: INTERVENTIONS:  - Educate patient/family on patient safety including physical limitations  - Instruct patient to call for assistance with activity   - Consult OT/PT to assist with strengthening/mobility   - Keep Call bell within reach  - Keep bed low and locked with side rails adjusted as appropriate  - Keep care items and personal belongings within reach  - Initiate and maintain comfort rounds  - Make Fall Risk Sign visible to staff  - Offer Toileting every 2 Hours, in advance of need  - Initiate/Maintain bed alarm  - Obtain necessary fall risk management equipment: call bell   - Apply yellow socks and bracelet for high fall risk patients  - Consider moving patient to room near nurses station  Outcome: Progressing     Problem: Prexisting or High Potential for Compromised Skin Integrity  Goal: Skin integrity is maintained or improved  Description: INTERVENTIONS:  - Identify patients at risk for skin breakdown  - Assess and monitor skin integrity  - Assess and monitor nutrition and hydration status  - Monitor labs   - Assess for incontinence   - Turn and reposition patient  - Assist with mobility/ambulation  - Relieve pressure over bony prominences  - Avoid friction and shearing  - Provide appropriate hygiene as needed including keeping skin clean and dry  - Evaluate need for skin moisturizer/barrier cream  - Collaborate with interdisciplinary team   - Patient/family teaching  - Consider wound care consult   Outcome: Progressing     Problem: SAFETY,RESTRAINT: NV/NON-SELF DESTRUCTIVE BEHAVIOR  Goal: Remains free of harm/injury (restraint for non violent/non self-detsructive behavior)  Description: INTERVENTIONS:  - Instruct patient/family regarding restraint use   - Assess and monitor physiologic and psychological status   - Provide interventions and comfort measures to meet assessed patient needs   - Identify and implement measures to help patient regain control  - Assess readiness for release of restraint   Outcome: Progressing  Goal: Returns to optimal restraint-free functioning  Description: INTERVENTIONS:  - Assess the patient's behavior and symptoms that indicate continued need for restraint  - Identify and implement measures to help patient regain control  - Assess readiness for release of restraint   Outcome: Progressing     Problem: Nutrition/Hydration-ADULT  Goal: Nutrient/Hydration intake appropriate for improving, restoring or maintaining nutritional needs  Description: Monitor and assess patient's nutrition/hydration status for malnutrition  Collaborate with interdisciplinary team and initiate plan and interventions as ordered  Monitor patient's weight and dietary intake as ordered or per policy  Utilize nutrition screening tool and intervene as necessary  Determine patient's food preferences and provide high-protein, high-caloric foods as appropriate       INTERVENTIONS:  - Monitor oral intake, urinary output, labs, and treatment plans  - Assess nutrition and hydration status and recommend course of action  - Evaluate amount of meals eaten  - Assist patient with eating if necessary   - Allow adequate time for meals  - Recommend/ encourage appropriate diets, oral nutritional supplements, and vitamin/mineral supplements  - Order, calculate, and assess calorie counts as needed  - Recommend, monitor, and adjust tube feedings and TPN/PPN based on assessed needs  - Assess need for intravenous fluids  - Provide specific nutrition/hydration education as appropriate  - Include patient/family/caregiver in decisions related to nutrition  Outcome: Progressing

## 2022-10-02 LAB
ALBUMIN SERPL BCP-MCNC: 2.9 G/DL (ref 3.5–5)
ALP SERPL-CCNC: 72 U/L (ref 46–116)
ALT SERPL W P-5'-P-CCNC: 29 U/L (ref 12–78)
ANION GAP SERPL CALCULATED.3IONS-SCNC: 7 MMOL/L (ref 4–13)
AST SERPL W P-5'-P-CCNC: 84 U/L (ref 5–45)
BASOPHILS # BLD AUTO: 0.01 THOUSANDS/ΜL (ref 0–0.1)
BASOPHILS NFR BLD AUTO: 0 % (ref 0–1)
BILIRUB SERPL-MCNC: 0.79 MG/DL (ref 0.2–1)
BUN SERPL-MCNC: 32 MG/DL (ref 5–25)
CALCIUM ALBUM COR SERPL-MCNC: 9.4 MG/DL (ref 8.3–10.1)
CALCIUM SERPL-MCNC: 8.5 MG/DL (ref 8.3–10.1)
CHLORIDE SERPL-SCNC: 102 MMOL/L (ref 96–108)
CO2 SERPL-SCNC: 31 MMOL/L (ref 21–32)
CREAT SERPL-MCNC: 2.1 MG/DL (ref 0.6–1.3)
EOSINOPHIL # BLD AUTO: 0 THOUSAND/ΜL (ref 0–0.61)
EOSINOPHIL NFR BLD AUTO: 0 % (ref 0–6)
ERYTHROCYTE [DISTWIDTH] IN BLOOD BY AUTOMATED COUNT: 16.6 % (ref 11.6–15.1)
GFR SERPL CREATININE-BSD FRML MDRD: 22 ML/MIN/1.73SQ M
GLUCOSE SERPL-MCNC: 110 MG/DL (ref 65–140)
GLUCOSE SERPL-MCNC: 267 MG/DL (ref 65–140)
HCT VFR BLD AUTO: 39 % (ref 34.8–46.1)
HGB BLD-MCNC: 12.4 G/DL (ref 11.5–15.4)
IMM GRANULOCYTES # BLD AUTO: 0.02 THOUSAND/UL (ref 0–0.2)
IMM GRANULOCYTES NFR BLD AUTO: 0 % (ref 0–2)
LYMPHOCYTES # BLD AUTO: 0.93 THOUSANDS/ΜL (ref 0.6–4.47)
LYMPHOCYTES NFR BLD AUTO: 12 % (ref 14–44)
MCH RBC QN AUTO: 31.4 PG (ref 26.8–34.3)
MCHC RBC AUTO-ENTMCNC: 31.8 G/DL (ref 31.4–37.4)
MCV RBC AUTO: 99 FL (ref 82–98)
MONOCYTES # BLD AUTO: 0.69 THOUSAND/ΜL (ref 0.17–1.22)
MONOCYTES NFR BLD AUTO: 9 % (ref 4–12)
NEUTROPHILS # BLD AUTO: 6.03 THOUSANDS/ΜL (ref 1.85–7.62)
NEUTS SEG NFR BLD AUTO: 79 % (ref 43–75)
NRBC BLD AUTO-RTO: 0 /100 WBCS
PLATELET # BLD AUTO: 188 THOUSANDS/UL (ref 149–390)
PMV BLD AUTO: 10.4 FL (ref 8.9–12.7)
POTASSIUM SERPL-SCNC: 3.8 MMOL/L (ref 3.5–5.3)
PROT SERPL-MCNC: 6.9 G/DL (ref 6.4–8.4)
RBC # BLD AUTO: 3.95 MILLION/UL (ref 3.81–5.12)
SODIUM SERPL-SCNC: 140 MMOL/L (ref 135–147)
WBC # BLD AUTO: 7.68 THOUSAND/UL (ref 4.31–10.16)

## 2022-10-02 PROCEDURE — 83735 ASSAY OF MAGNESIUM: CPT | Performed by: NURSE PRACTITIONER

## 2022-10-02 PROCEDURE — 82948 REAGENT STRIP/BLOOD GLUCOSE: CPT

## 2022-10-02 PROCEDURE — 80053 COMPREHEN METABOLIC PANEL: CPT | Performed by: STUDENT IN AN ORGANIZED HEALTH CARE EDUCATION/TRAINING PROGRAM

## 2022-10-02 PROCEDURE — 85025 COMPLETE CBC W/AUTO DIFF WBC: CPT | Performed by: STUDENT IN AN ORGANIZED HEALTH CARE EDUCATION/TRAINING PROGRAM

## 2022-10-02 PROCEDURE — 99232 SBSQ HOSP IP/OBS MODERATE 35: CPT | Performed by: STUDENT IN AN ORGANIZED HEALTH CARE EDUCATION/TRAINING PROGRAM

## 2022-10-02 PROCEDURE — 99233 SBSQ HOSP IP/OBS HIGH 50: CPT | Performed by: INTERNAL MEDICINE

## 2022-10-02 RX ORDER — BUMETANIDE 1 MG/1
1 TABLET ORAL DAILY
Status: DISCONTINUED | OUTPATIENT
Start: 2022-10-03 | End: 2022-10-03

## 2022-10-02 RX ORDER — METOPROLOL TARTRATE 5 MG/5ML
2.5 INJECTION INTRAVENOUS ONCE
Status: COMPLETED | OUTPATIENT
Start: 2022-10-03 | End: 2022-10-03

## 2022-10-02 RX ADMIN — APIXABAN 5 MG: 5 TABLET, FILM COATED ORAL at 09:24

## 2022-10-02 RX ADMIN — HYDROCORTISONE 1 APPLICATION: 10 OINTMENT TOPICAL at 13:32

## 2022-10-02 RX ADMIN — ASPIRIN 81 MG CHEWABLE TABLET 81 MG: 81 TABLET CHEWABLE at 09:24

## 2022-10-02 RX ADMIN — DILTIAZEM HYDROCHLORIDE 10 MG/HR: 5 INJECTION INTRAVENOUS at 00:37

## 2022-10-02 RX ADMIN — APIXABAN 5 MG: 5 TABLET, FILM COATED ORAL at 17:35

## 2022-10-02 NOTE — PLAN OF CARE
Problem: CARDIOVASCULAR - ADULT  Goal: Maintains optimal cardiac output and hemodynamic stability  Description: INTERVENTIONS:  - Monitor I/O, vital signs and rhythm  - Monitor for S/S and trends of decreased cardiac output  - Administer and titrate ordered vasoactive medications to optimize hemodynamic stability  - Assess quality of pulses, skin color and temperature  - Assess for signs of decreased coronary artery perfusion  - Instruct patient to report change in severity of symptoms  Outcome: Progressing     Problem: CARDIOVASCULAR - ADULT  Goal: Absence of cardiac dysrhythmias or at baseline rhythm  Description: INTERVENTIONS:  - Continuous cardiac monitoring, vital signs, obtain 12 lead EKG if ordered  - Administer antiarrhythmic and heart rate control medications as ordered  - Monitor electrolytes and administer replacement therapy as ordered  Outcome: Progressing     Problem: MOBILITY - ADULT  Goal: Maintain or return to baseline ADL function  Description: INTERVENTIONS:  -  Assess patient's ability to carry out ADLs; assess patient's baseline for ADL function and identify physical deficits which impact ability to perform ADLs (bathing, care of mouth/teeth, toileting, grooming, dressing, etc )  - Assess/evaluate cause of self-care deficits   - Assess range of motion  - Assess patient's mobility; develop plan if impaired  - Assess patient's need for assistive devices and provide as appropriate  - Encourage maximum independence but intervene and supervise when necessary  - Involve family in performance of ADLs  - Assess for home care needs following discharge   - Consider OT consult to assist with ADL evaluation and planning for discharge  - Provide patient education as appropriate  Outcome: Progressing     Problem: Potential for Falls  Goal: Patient will remain free of falls  Description: INTERVENTIONS:  - Educate patient/family on patient safety including physical limitations  - Instruct patient to call for assistance with activity   - Consult OT/PT to assist with strengthening/mobility   - Keep Call bell within reach  - Keep bed low and locked with side rails adjusted as appropriate  - Keep care items and personal belongings within reach  - Initiate and maintain comfort rounds  - Make Fall Risk Sign visible to staff  - Offer Toileting every 2 Hours, in advance of need  - Initiate/Maintain bed/chair alarm  - Obtain necessary fall risk management equipment: fall risk sign  - Apply yellow socks and bracelet for high fall risk patients  - Consider moving patient to room near nurses station  Outcome: Progressing     Problem: Prexisting or High Potential for Compromised Skin Integrity  Goal: Skin integrity is maintained or improved  Description: INTERVENTIONS:  - Identify patients at risk for skin breakdown  - Assess and monitor skin integrity  - Assess and monitor nutrition and hydration status  - Monitor labs   - Assess for incontinence   - Turn and reposition patient  - Assist with mobility/ambulation  - Relieve pressure over bony prominences  - Avoid friction and shearing  - Provide appropriate hygiene as needed including keeping skin clean and dry  - Evaluate need for skin moisturizer/barrier cream  - Collaborate with interdisciplinary team   - Patient/family teaching  - Consider wound care consult   Outcome: Progressing

## 2022-10-02 NOTE — PROGRESS NOTES
Daily Cardiology Progress Note              LOS: 3 days     Assessment/Plan     Principal Problem:    Acute exacerbation of CHF (congestive heart failure) ([facility])  Active Problems:    Essential hypertension    Coronary artery disease involving native coronary artery of native heart without angina pectoris    CKD (chronic kidney disease) stage 4, GFR 15-29 ml/min ([facility])    Rash and nonspecific skin eruption    Dementia with behavioral disturbance    Infestation by bed bug    Sinoatrial node tachycardia    1  Newly diagnosed atrial fibrillation - patient converted to sinus rhythm overnight   - may discontinue Cardizem infusion  - Continue metoprolol 50 mg bid  - Continue Eliquis  2  Acute on chronic diastolic CHF  - Likely exacerbated by tachycardia  - Restart on IV diuretics - given Bumex this AM    - May switch to PO diuretics in AM   She had ? Allergic reaction to torsemide  Has tolerated IV bumex  Will continue PO Bumex starting in AM        3  Hypertension  - BP stable - monitor with discontinuation of cardizem gtt  4  Dementia  5  CAD with prior PCI of RCA and LAD  - No angina at this time  - Continue metoprolol  - Continue Eliquis  Subjective     Interval History:  Patient returned to sinus rhythm overnight  She had episodes of agitation and required medication  She is lethargic this morning  Family at bedside  Objective     Vital signs in last 24 hours:  Temp:  [98 °F (36 7 °C)-99 1 °F (37 3 °C)] 98 5 °F (36 9 °C)  HR:  [60-84] 65  Resp:  [16-19] 18  BP: (105-135)/(55-83) 107/58  Weight (last 2 days)     Date/Time Weight    10/02/22 0547 89 9 (198 1)    09/30/22 1320 90 7 (200)    09/30/22 0600 90 7 (200)    09/30/22 0125 90 7 (200)           Intake/Output last 3 shifts:  I/O last 3 completed shifts: In: 582 4 [P O :290; I V :292 4]  Out: 1534 [LEBRS:3122]  Intake/Output this shift:  No intake/output data recorded  Physical Exam:  Physical Exam   Constitutional: She appears healthy  No distress  Cardiovascular: Normal rate, regular rhythm and normal heart sounds  Exam reveals no gallop and no friction rub  No murmur heard  Pulmonary/Chest: Effort normal and breath sounds normal  She has no wheezes  She has no rales  Musculoskeletal:         General: No tenderness, deformity or edema  Neurological: She exhibits altered mental status  lethargic   Skin: Skin is warm and dry  Lab Results: I have personally reviewed pertinent lab results  Imaging: I have personally reviewed pertinent films in PACS  EKG/Tele: Reviewed    Normal sinus rhythm

## 2022-10-02 NOTE — PROGRESS NOTES
Katharine 45  Progress Note - Nito Artis 1945, 68 y o  female MRN: [id number]  Unit/Bed#: [address] [place] [address] Encounter: [id number]  Primary Care Provider: Kurtis Cardenas MD   Date and time admitted to hospital: 9/28/2022  5:56 PM    Sinoatrial node tachycardia  Assessment & Plan  Unresolved, Asymptomatic,  tele continued  Increase metoprolol to 50 mg B i d  Cardizem drip in place  Cardio  recs: to continue with increased metoprolol initiate anti-coag continue diuresing          * Acute exacerbation of CHF (congestive heart failure) (HCC)  Assessment & Plan  Wt Readings from Last 3 Encounters:   09/30/22 90 7 kg (200 lb)   09/02/22 89 4 kg (197 lb)   03/11/22 86 2 kg (190 lb)     Acute CHF likely Diastolic in the setting of Hypertension evidenced by BNP 10,385, SOB and leg edema, treated with IV Bumex BID, daily weights, I&Os and fluid restriction      · Was previously on torsemide 20mg but stopped secondary to drug reaction, has been on ethacrynic acid for 5 days with no improvement, currently on Bumex   Last Echo shows EF 33%, normal diastolic and systolic function, moderate tricuspid regurg, repeat pending          Dementia with behavioral disturbance  Assessment & Plan  Pt lives alone, daughter concerned she is unable to care for herself  · No longer on medication  · CM consult for long-term placement  · Prn geodon for agitation    Infestation by bed bug  Assessment & Plan  Decontaminated in ED    Rash and nonspecific skin eruption  Assessment & Plan  Improving,  Patient with rash over body that has been presents for a few weeks  · Seen by dermatologist and told she has sulfa allergy, reaction to torsemide, rash actually 2/2 Bed bugs  · Hydrocortisone cream, benadryl prn    CKD (chronic kidney disease) stage 4, GFR 15-29 ml/min [facility])  Assessment & Plan  Lab Results   Component Value Date    EGFR 26 10/01/2022    EGFR 28 09/29/2022    EGFR 27 09/28/2022    CREATININE 1 80 (H) 10/01/2022 CREATININE 1 73 (H) 09/29/2022    CREATININE 1 76 (H) 09/28/2022     Creatinine at baseline, monitor with BMP  Continue to diurese and replenish to avoid renal damage  Avoid nephrotoxic    Coronary artery disease involving native coronary artery of native heart without angina pectoris  Assessment & Plan  Asymptomatic,  S/p PCI in 2012 with NARGIS to RCA, 2013 NARGIS to LAD    · Troponin trend negative  · Continue metoprolol    Essential hypertension  Assessment & Plan  Chronic, Continue increased metoprolol 50mg bid        VTE Pharmacologic Prophylaxis: VTE Score: 4 Moderate Risk (Score 3-4) - Pharmacological DVT Prophylaxis Ordered: heparin  Patient Centered Rounds: I performed bedside rounds with nursing staff today  Discussions with Specialists or Other Care Team Provider:  Cardiology, case management    Education and Discussions with Family / Patient: Updated  (daughter) at bedside  Benton John    Time Spent for Care: 30 minutes  More than 50% of total time spent on counseling and coordination of care as described above  Current Length of Stay: 3 day(s)  Current Patient Status: Inpatient   Certification Statement: The patient will continue to require additional inpatient hospital stay due to Clinical course of care  Discharge Plan: Anticipate discharge in 24-48 hrs to discharge location to be determined pending rehab evaluations  Code Status: Level 1 - Full Code    Subjective:   Patient seen and examined at bedside with daughter and daughter's friend  Patient was a little disoriented, and nursing reported patient being agitated overnight and getting p r n  Agitation medicines which contributed to a m  Mentation versus possible worsening dementia versus inpatient delirium  Patient family counseled that p r n  Meds would be removed and patient would would have to be off soft restraints prior to discharge 24 hours  Patient's family agreed with plan of care      Objective:     Vitals:   Temp (24hrs), Av 6 °F (37 °C), Min:98 °F (36 7 °C), Max:99 1 °F (37 3 °C)    Temp:  [98 °F (36 7 °C)-99 1 °F (37 3 °C)] 98 5 °F (36 9 °C)  HR:  [60-84] 62  Resp:  [16-19] 16  BP: (105-135)/(55-83) 105/55  SpO2:  [92 %-93 %] 93 %  Body mass index is 31 03 kg/m²  Input and Output Summary (last 24 hours): Intake/Output Summary (Last 24 hours) at 10/2/2022 1034  Last data filed at 10/2/2022 0537  Gross per 24 hour   Intake 582 42 ml   Output 1534 ml   Net -951 58 ml       Physical Exam:   Physical Exam  Vitals and nursing note reviewed  Constitutional:       General: She is not in acute distress  Appearance: She is well-developed  She is obese  She is not ill-appearing  HENT:      Head: Normocephalic and atraumatic  Eyes:      Conjunctiva/sclera: Conjunctivae normal    Cardiovascular:      Rate and Rhythm: Normal rate and regular rhythm  Heart sounds: No murmur heard  Pulmonary:      Effort: Pulmonary effort is normal  No respiratory distress  Breath sounds: Normal breath sounds  No wheezing or rales  Abdominal:      General: There is no distension  Palpations: Abdomen is soft  Tenderness: There is no abdominal tenderness  There is no guarding  Musculoskeletal:      Cervical back: Neck supple  Right lower leg: No edema  Left lower leg: No edema  Skin:     General: Skin is warm and dry  Neurological:      Mental Status: She is alert  She is disoriented     Psychiatric:         Mood and Affect: Mood normal           Additional Data:     Labs:  Results from last 7 days   Lab Units 10/01/22  1222   WBC Thousand/uL 7 01   HEMOGLOBIN g/dL 13 6   HEMATOCRIT % 42 0   PLATELETS Thousands/uL 197   NEUTROS PCT % 83*   LYMPHS PCT % 10*   MONOS PCT % 7   EOS PCT % 0     Results from last 7 days   Lab Units 10/01/22  1222   SODIUM mmol/L 138   POTASSIUM mmol/L 3 9   CHLORIDE mmol/L 100   CO2 mmol/L 27   BUN mg/dL 25   CREATININE mg/dL 1 80*   ANION GAP mmol/L 11   CALCIUM mg/dL 8  8   ALBUMIN g/dL 3 4*   TOTAL BILIRUBIN mg/dL 0 86   ALK PHOS U/L 84   ALT U/L 24   AST U/L 66*   GLUCOSE RANDOM mg/dL 127                       Lines/Drains:  Invasive Devices  Report    Peripheral Intravenous Line  Duration           Peripheral IV 09/30/22 Right;Ventral (anterior) Forearm 2 days          Drain  Duration           External Urinary Catheter <1 day                  Telemetry:  Telemetry Orders (From admission, onward)             48 Hour Telemetry Monitoring  Continuous x 48 hours        References:    Telemetry Guidelines   Question:  Reason for 48 Hour Telemetry  Answer:  Acute Decompensated CHF (continuous diuretic infusion or total diuretic dose > 200 mg daily, associated electrolyte derangement, ionotropic drip, history of ventricular arrhythmia, or new EF <35%)                 Telemetry Reviewed: Sinus Tachycardia  Indication for Continued Telemetry Use: Arrthymias requiring medical therapy             Imaging: Reviewed radiology reports from this admission including: vas    Recent Cultures (last 7 days):         Last 24 Hours Medication List:   Current Facility-Administered Medications   Medication Dose Route Frequency Provider Last Rate    acetaminophen  650 mg Oral Q6H PRN Venu Frazier PA-C      apixaban  5 mg Oral BID BRADLY Norman      aspirin  81 mg Oral Daily Solange HENRIQUE Martinez      bumetanide  1 mg Intravenous Daily Jovanny Borges MD      diltiazem  10 mg/hr Intravenous Continuous Navtej Bryan, DO 10 mg/hr (10/02/22 0037)    diphenhydrAMINE  12 5 mg Intravenous Q6H PRN Jovanny Borges MD      hydrocortisone   Topical 4x Daily PRN Venu Frazier PA-C      ipratropium  2 spray Nasal Q12H Venu Frazier PA-C      metoprolol tartrate  50 mg Oral BID Jovanny Borges MD      ondansetron  4 mg Intravenous Q6H PRN Venu Frazier PA-C      traMADol  50 mg Oral Q6H PRN Venu Frazier PA-C      ziprasidone  20 mg Oral BID PRN Jovanny Borges MD Today, Patient Was Seen By: Mily Nuñez    **Please Note: This note may have been constructed using a voice recognition system  **

## 2022-10-03 PROBLEM — I34.0: Status: ACTIVE | Noted: 2022-10-03

## 2022-10-03 PROBLEM — I48.0 PAF (PAROXYSMAL ATRIAL FIBRILLATION) (HCC): Status: ACTIVE | Noted: 2022-09-30

## 2022-10-03 PROBLEM — I25.2: Status: ACTIVE | Noted: 2022-10-03

## 2022-10-03 LAB
ALBUMIN SERPL BCP-MCNC: 2.8 G/DL (ref 3.5–5)
ALP SERPL-CCNC: 68 U/L (ref 46–116)
ALT SERPL W P-5'-P-CCNC: 35 U/L (ref 12–78)
ANION GAP SERPL CALCULATED.3IONS-SCNC: 9 MMOL/L (ref 4–13)
AST SERPL W P-5'-P-CCNC: 87 U/L (ref 5–45)
BILIRUB SERPL-MCNC: 0.89 MG/DL (ref 0.2–1)
BUN SERPL-MCNC: 33 MG/DL (ref 5–25)
CALCIUM ALBUM COR SERPL-MCNC: 9.7 MG/DL (ref 8.3–10.1)
CALCIUM SERPL-MCNC: 8.7 MG/DL (ref 8.3–10.1)
CHLORIDE SERPL-SCNC: 103 MMOL/L (ref 96–108)
CO2 SERPL-SCNC: 30 MMOL/L (ref 21–32)
CREAT SERPL-MCNC: 1.82 MG/DL (ref 0.6–1.3)
GFR SERPL CREATININE-BSD FRML MDRD: 26 ML/MIN/1.73SQ M
GLUCOSE SERPL-MCNC: 90 MG/DL (ref 65–140)
MAGNESIUM SERPL-MCNC: 2.2 MG/DL (ref 1.6–2.6)
POTASSIUM SERPL-SCNC: 3.7 MMOL/L (ref 3.5–5.3)
PROT SERPL-MCNC: 6.9 G/DL (ref 6.4–8.4)
SODIUM SERPL-SCNC: 142 MMOL/L (ref 135–147)

## 2022-10-03 PROCEDURE — 80053 COMPREHEN METABOLIC PANEL: CPT | Performed by: STUDENT IN AN ORGANIZED HEALTH CARE EDUCATION/TRAINING PROGRAM

## 2022-10-03 PROCEDURE — 90732 PPSV23 VACC 2 YRS+ SUBQ/IM: CPT | Performed by: STUDENT IN AN ORGANIZED HEALTH CARE EDUCATION/TRAINING PROGRAM

## 2022-10-03 PROCEDURE — G0008 ADMIN INFLUENZA VIRUS VAC: HCPCS | Performed by: STUDENT IN AN ORGANIZED HEALTH CARE EDUCATION/TRAINING PROGRAM

## 2022-10-03 PROCEDURE — 97110 THERAPEUTIC EXERCISES: CPT

## 2022-10-03 PROCEDURE — 90662 IIV NO PRSV INCREASED AG IM: CPT | Performed by: STUDENT IN AN ORGANIZED HEALTH CARE EDUCATION/TRAINING PROGRAM

## 2022-10-03 PROCEDURE — 99232 SBSQ HOSP IP/OBS MODERATE 35: CPT | Performed by: INTERNAL MEDICINE

## 2022-10-03 PROCEDURE — G0009 ADMIN PNEUMOCOCCAL VACCINE: HCPCS | Performed by: STUDENT IN AN ORGANIZED HEALTH CARE EDUCATION/TRAINING PROGRAM

## 2022-10-03 PROCEDURE — 99232 SBSQ HOSP IP/OBS MODERATE 35: CPT | Performed by: STUDENT IN AN ORGANIZED HEALTH CARE EDUCATION/TRAINING PROGRAM

## 2022-10-03 RX ORDER — SODIUM CHLORIDE 9 MG/ML
75 INJECTION, SOLUTION INTRAVENOUS CONTINUOUS
Status: DISCONTINUED | OUTPATIENT
Start: 2022-10-03 | End: 2022-10-04

## 2022-10-03 RX ADMIN — METOPROLOL TARTRATE 50 MG: 50 TABLET, FILM COATED ORAL at 09:53

## 2022-10-03 RX ADMIN — APIXABAN 5 MG: 5 TABLET, FILM COATED ORAL at 17:41

## 2022-10-03 RX ADMIN — METOPROLOL TARTRATE 2.5 MG: 1 INJECTION, SOLUTION INTRAVENOUS at 00:13

## 2022-10-03 RX ADMIN — PNEUMOCOCCAL VACCINE POLYVALENT 0.5 ML
25; 25; 25; 25; 25; 25; 25; 25; 25; 25; 25; 25; 25; 25; 25; 25; 25; 25; 25; 25; 25; 25; 25 INJECTION, SOLUTION INTRAMUSCULAR; SUBCUTANEOUS at 14:17

## 2022-10-03 RX ADMIN — ASPIRIN 81 MG CHEWABLE TABLET 81 MG: 81 TABLET CHEWABLE at 09:53

## 2022-10-03 RX ADMIN — SODIUM CHLORIDE 75 ML/HR: 0.9 INJECTION, SOLUTION INTRAVENOUS at 09:53

## 2022-10-03 RX ADMIN — INFLUENZA A VIRUS A/VICTORIA/2570/2019 IVR-215 (H1N1) ANTIGEN (FORMALDEHYDE INACTIVATED), INFLUENZA A VIRUS A/DARWIN/9/2021 SAN-010 (H3N2) ANTIGEN (FORMALDEHYDE INACTIVATED), INFLUENZA B VIRUS B/PHUKET/3073/2013 ANTIGEN (FORMALDEHYDE INACTIVATED), AND INFLUENZA B VIRUS B/MICHIGAN/01/2021 ANTIGEN (FORMALDEHYDE INACTIVATED) 0.7 ML: 60; 60; 60; 60 INJECTION, SUSPENSION INTRAMUSCULAR at 11:18

## 2022-10-03 RX ADMIN — METOPROLOL TARTRATE 50 MG: 50 TABLET, FILM COATED ORAL at 20:17

## 2022-10-03 RX ADMIN — BUMETANIDE 1 MG: 1 TABLET ORAL at 09:53

## 2022-10-03 RX ADMIN — TRAMADOL HYDROCHLORIDE 50 MG: 50 TABLET, COATED ORAL at 14:30

## 2022-10-03 RX ADMIN — APIXABAN 5 MG: 5 TABLET, FILM COATED ORAL at 09:53

## 2022-10-03 NOTE — NURSING NOTE
Nurse Rommel Mota) contacted patient's daughter (Sally Cabrera) over the phone in regards to the pneumonia and flu vaccine screenings  At this time, the patient's daughter consents to administration of the flu and vaccine screenings  Orders were placed for vaccines based off of completed screening  This was communicated to MD Yessica Long (Hospitalist) and Rex Aragon RN (nurse in charge of patient's care)

## 2022-10-03 NOTE — CASE MANAGEMENT
Case Management Progress Note    Patient name Prabhjot Kelley  Location 3 [address]/3 2020 [facility]-* MRN [id number]  : 1945 Date 10/3/2022       LOS (days): 4  Geometric Mean LOS (GMLOS) (days): 3 90  Days to GMLOS:0 3        OBJECTIVE:        Current admission status: Inpatient  Preferred Pharmacy:   [facility] [address] [facility], [address] [place] [address] [address] [facility]  Phone: [phone] Fax: [phone]    Primary Care Provider: Misti Cadet MD    Primary Insurance: MEDICARE  Secondary Insurance: COMMERCIAL MISCELLANEOUS    PROGRESS NOTE:    CM reviewed pt in Interdisciplinary rounds  Patient remains off restraints, per Cardiology note, plan to hold diuresis due to poor PO intake, continue IVF, and resume PO diuresis when stable    [facility] for rehab at [facility]

## 2022-10-03 NOTE — PLAN OF CARE
Problem: MOBILITY - ADULT  Goal: Maintain or return to baseline ADL function  Description: INTERVENTIONS:  -  Assess patient's ability to carry out ADLs; assess patient's baseline for ADL function and identify physical deficits which impact ability to perform ADLs (bathing, care of mouth/teeth, toileting, grooming, dressing, etc )  - Assess/evaluate cause of self-care deficits   - Assess range of motion  - Assess patient's mobility; develop plan if impaired  - Assess patient's need for assistive devices and provide as appropriate  - Encourage maximum independence but intervene and supervise when necessary  - Involve family in performance of ADLs  - Assess for home care needs following discharge   - Consider OT consult to assist with ADL evaluation and planning for discharge  - Provide patient education as appropriate  Outcome: Progressing  Goal: Maintains/Returns to pre admission functional level  Description: INTERVENTIONS:  - Perform BMAT or MOVE assessment daily    - Set and communicate daily mobility goal to care team and patient/family/caregiver  - Collaborate with rehabilitation services on mobility goals if consulted  - Perform Range of Motion 2 times a day  - Reposition patient every 2 hours    - Dangle patient 2 times a day  - Stand patient 2 times a day  - Ambulate patient 2 times a day  - Out of bed to chair 2 times a day   - Out of bed for meals 2 times a day  - Out of bed for toileting  - Record patient progress and toleration of activity level   Outcome: Progressing     Problem: PAIN - ADULT  Goal: Verbalizes/displays adequate comfort level or baseline comfort level  Description: Interventions:  - Encourage patient to monitor pain and request assistance  - Assess pain using appropriate pain scale  - Administer analgesics based on type and severity of pain and evaluate response  - Implement non-pharmacological measures as appropriate and evaluate response  - Consider cultural and social influences on pain and pain management  - Notify physician/advanced practitioner if interventions unsuccessful or patient reports new pain  Outcome: Progressing     Problem: SAFETY ADULT  Goal: Maintain or return to baseline ADL function  Description: INTERVENTIONS:  -  Assess patient's ability to carry out ADLs; assess patient's baseline for ADL function and identify physical deficits which impact ability to perform ADLs (bathing, care of mouth/teeth, toileting, grooming, dressing, etc )  - Assess/evaluate cause of self-care deficits   - Assess range of motion  - Assess patient's mobility; develop plan if impaired  - Assess patient's need for assistive devices and provide as appropriate  - Encourage maximum independence but intervene and supervise when necessary  - Involve family in performance of ADLs  - Assess for home care needs following discharge   - Consider OT consult to assist with ADL evaluation and planning for discharge  - Provide patient education as appropriate  Outcome: Progressing  Goal: Patient will remain free of falls  Description: INTERVENTIONS:  - Educate patient/family on patient safety including physical limitations  - Instruct patient to call for assistance with activity   - Consult OT/PT to assist with strengthening/mobility   - Keep Call bell within reach  - Keep bed low and locked with side rails adjusted as appropriate  - Keep care items and personal belongings within reach  - Initiate and maintain comfort rounds  - Make Fall Risk Sign visible to staff  - Offer Toileting every 2 Hours, in advance of need  - Initiate/Maintain bed alarm  - Obtain necessary fall risk management equipment: call bell   - Apply yellow socks and bracelet for high fall risk patients  - Consider moving patient to room near nurses station  Outcome: Progressing     Problem: DISCHARGE PLANNING  Goal: Discharge to home or other facility with appropriate resources  Description: INTERVENTIONS:  - Identify barriers to discharge w/patient and caregiver  - Arrange for needed discharge resources and transportation as appropriate  - Identify discharge learning needs (meds, wound care, etc )  - Arrange for interpretive services to assist at discharge as needed  - Refer to Case Management Department for coordinating discharge planning if the patient needs post-hospital services based on physician/advanced practitioner order or complex needs related to functional status, cognitive ability, or social support system  Outcome: Progressing     Problem: CARDIOVASCULAR - ADULT  Goal: Maintains optimal cardiac output and hemodynamic stability  Description: INTERVENTIONS:  - Monitor I/O, vital signs and rhythm  - Monitor for S/S and trends of decreased cardiac output  - Administer and titrate ordered vasoactive medications to optimize hemodynamic stability  - Assess quality of pulses, skin color and temperature  - Assess for signs of decreased coronary artery perfusion  - Instruct patient to report change in severity of symptoms  Outcome: Progressing  Goal: Absence of cardiac dysrhythmias or at baseline rhythm  Description: INTERVENTIONS:  - Continuous cardiac monitoring, vital signs, obtain 12 lead EKG if ordered  - Administer antiarrhythmic and heart rate control medications as ordered  - Monitor electrolytes and administer replacement therapy as ordered  Outcome: Progressing     Problem: SKIN/TISSUE INTEGRITY - ADULT  Goal: Skin Integrity remains intact(Skin Breakdown Prevention)  Description: Assess:  -Perform Judah assessment every shift   -Clean and moisturize skin every incontinent wipes   -Inspect skin when repositioning, toileting, and assisting with ADLS  -Assess under medical devices such as purewick  every shift   -Assess extremities for adequate circulation and sensation     Bed Management:  -Have minimal linens on bed & keep smooth, unwrinkled  -Change linens as needed when moist or perspiring  -Avoid sitting or lying in one position for more than 2 hours while in bed  -Keep HOB at 45 degrees     Toileting:  -Offer bedside commode  -Assess for incontinence every 2 hours   -Use incontinent care products after each incontinent episode such as moisturizing pads     Activity:  -Mobilize patient 2 times a day  -Encourage activity and walks on unit  -Encourage or provide ROM exercises   -Turn and reposition patient every 2 Hours  -Use appropriate equipment to lift or move patient in bed  -Instruct/ Assist with weight shifting every 1 hour when out of bed in chair  -Consider limitation of chair time 1 hour intervals    Skin Care:  -Avoid use of baby powder, tape, friction and shearing, hot water or constrictive clothing  -Relieve pressure over bony prominences using foam wedges  -Do not massage red bony areas    Next Steps:  -Teach patient strategies to minimize risks such as weight shifting    -Consider consults to  interdisciplinary teams such as wound team   Outcome: Progressing     Problem: MUSCULOSKELETAL - ADULT  Goal: Maintain or return mobility to safest level of function  Description: INTERVENTIONS:  - Assess patient's ability to carry out ADLs; assess patient's baseline for ADL function and identify physical deficits which impact ability to perform ADLs (bathing, care of mouth/teeth, toileting, grooming, dressing, etc )  - Assess/evaluate cause of self-care deficits   - Assess range of motion  - Assess patient's mobility  - Assess patient's need for assistive devices and provide as appropriate  - Encourage maximum independence but intervene and supervise when necessary  - Involve family in performance of ADLs  - Assess for home care needs following discharge   - Consider OT consult to assist with ADL evaluation and planning for discharge  - Provide patient education as appropriate  Outcome: Progressing     Problem: Potential for Falls  Goal: Patient will remain free of falls  Description: INTERVENTIONS:  - Educate patient/family on patient safety including physical limitations  - Instruct patient to call for assistance with activity   - Consult OT/PT to assist with strengthening/mobility   - Keep Call bell within reach  - Keep bed low and locked with side rails adjusted as appropriate  - Keep care items and personal belongings within reach  - Initiate and maintain comfort rounds  - Make Fall Risk Sign visible to staff  - Offer Toileting every 2 Hours, in advance of need  - Initiate/Maintain bed alarm  - Obtain necessary fall risk management equipment: call bell   - Apply yellow socks and bracelet for high fall risk patients  - Consider moving patient to room near nurses station  Outcome: Progressing     Problem: Prexisting or High Potential for Compromised Skin Integrity  Goal: Skin integrity is maintained or improved  Description: INTERVENTIONS:  - Identify patients at risk for skin breakdown  - Assess and monitor skin integrity  - Assess and monitor nutrition and hydration status  - Monitor labs   - Assess for incontinence   - Turn and reposition patient  - Assist with mobility/ambulation  - Relieve pressure over bony prominences  - Avoid friction and shearing  - Provide appropriate hygiene as needed including keeping skin clean and dry  - Evaluate need for skin moisturizer/barrier cream  - Collaborate with interdisciplinary team   - Patient/family teaching  - Consider wound care consult   Outcome: Progressing     Problem: SAFETY,RESTRAINT: NV/NON-SELF DESTRUCTIVE BEHAVIOR  Goal: Remains free of harm/injury (restraint for non violent/non self-detsructive behavior)  Description: INTERVENTIONS:  - Instruct patient/family regarding restraint use   - Assess and monitor physiologic and psychological status   - Provide interventions and comfort measures to meet assessed patient needs   - Identify and implement measures to help patient regain control  - Assess readiness for release of restraint   Outcome: Progressing  Goal: Returns to optimal restraint-free functioning  Description: INTERVENTIONS:  - Assess the patient's behavior and symptoms that indicate continued need for restraint  - Identify and implement measures to help patient regain control  - Assess readiness for release of restraint   Outcome: Progressing     Problem: Nutrition/Hydration-ADULT  Goal: Nutrient/Hydration intake appropriate for improving, restoring or maintaining nutritional needs  Description: Monitor and assess patient's nutrition/hydration status for malnutrition  Collaborate with interdisciplinary team and initiate plan and interventions as ordered  Monitor patient's weight and dietary intake as ordered or per policy  Utilize nutrition screening tool and intervene as necessary  Determine patient's food preferences and provide high-protein, high-caloric foods as appropriate       INTERVENTIONS:  - Monitor oral intake, urinary output, labs, and treatment plans  - Assess nutrition and hydration status and recommend course of action  - Evaluate amount of meals eaten  - Assist patient with eating if necessary   - Allow adequate time for meals  - Recommend/ encourage appropriate diets, oral nutritional supplements, and vitamin/mineral supplements  - Order, calculate, and assess calorie counts as needed  - Recommend, monitor, and adjust tube feedings and TPN/PPN based on assessed needs  - Assess need for intravenous fluids  - Provide specific nutrition/hydration education as appropriate  - Include patient/family/caregiver in decisions related to nutrition  Outcome: Progressing

## 2022-10-03 NOTE — PHYSICAL THERAPY NOTE
PT TREATMENT     10/03/22 1520   Note Type   Note Type Treatment   Pain Assessment   Pain Assessment Tool Panda-Baker FACES   Panda-Baker FACES Pain Rating 0   Restrictions/Precautions   Other Precautions Chair Alarm; Bed Alarm;Cognitive; Fall Risk   General   Chart Reviewed Yes   Family/Caregiver Present No   Cognition   Arousal/Participation Cooperative;Lethargic   Comments Eyes closing at times, patient lethargic   Subjective   Subjective Patient confused at this time   Bed Mobility   Supine to Sit 2  Maximal assistance   Additional items Assist x 1   Sit to Supine 2  Maximal assistance   Additional items Assist x 1   Additional Comments Unable to fully assess standing and gait activity at this time due to confusion/safety   Exercises   Hamstring Stretch Supine;5 reps;PROM; Bilateral   Heelslides Supine;10 reps;Bilateral;AAROM   Hip Abduction Supine;10 reps;Bilateral;AAROM   Knee AROM Short Arc Quad Supine;10 reps;Bilateral   Ankle Pumps Supine;10 reps;Bilateral   Heel Cord Stretch Supine;5 reps;PROM; Bilateral   Balance training  Sitting balance activity completed with weight shifting   Assessment   Assessment Patient confused at this time although cooperative but eyes closing at times  Patient will benefit from continued physical therapy with progression as tolerated  The patient's AM-PAC Basic Mobility Inpatient Short Form Raw Score is 11  A Raw score of less than or equal to 16 suggests the patient may benefit from discharge to post-acute rehabilitation services  Please also refer to the recommendation of the Physical Therapist for safe discharge planning  Plan   Treatment/Interventions ADL retraining;Functional transfer training;LE strengthening/ROM; Elevations; Therapeutic exercise; Endurance training;Cognitive reorientation;Patient/family training;Equipment eval/education; Bed mobility;Gait training; Compensatory technique education   PT Frequency Other (Comment)  (5 times per week)   Recommendation   PT Discharge Recommendation Post acute rehabilitation services   AM-PAC Basic Mobility Inpatient   Turning in Bed Without Bedrails 2   Lying on Back to Sitting on Edge of Flat Bed 2   Moving Bed to Chair 2   Standing Up From Chair 2   Walk in Room 2   Climb 3-5 Stairs 1   Basic Mobility Inpatient Raw Score 11   Basic Mobility Standardized Score 30 25   Highest Level Of Mobility   JH-HLM Goal 4: Move to chair/commode   JH-HLM Achieved 4: Move to chair/commode   Education   Patient Explanation/teachback used; Reinforcement needed   [employer] License Number  [facility] [id number]

## 2022-10-03 NOTE — ASSESSMENT & PLAN NOTE
Noted on echo,   Moderate MV-regurg    TV-regurg  Continue current cardiac meds  Cardiology input appreciated, Restarted low-dose Bumex, tolerating well  Follow-up labs

## 2022-10-03 NOTE — PROGRESS NOTES
Progress Note - Cardiology   [facility]     Prabhjot Kelley 68 y o  female MRN: [id number]  : 1945  Unit/Bed#: [address] [place] [address] Encounter: [id number]    Assessment and Plan:   1  Paroxysmal atrial fibrillation:  Patient started on Cardizem drip and converted back to sinus rhythm on 10/02/2022    -  continue Lopressor 50 mg mg b i d     -  continue Eliquis 5 mg b i d     2  Acute on chronic diastolic heart failure:  EF on echocardiogram was 50%    -  patient had been on Lasix and furosemide in outpatient setting, but there was question of allergy due to rash  Patient transition to Bumex while here in hospital and rash has resolved  -  will hold p o  Bumex due to poor p o  Intake and resume when she is eating better  IV fluids initiated 10/03/2022 by primary team    -  continue Lopressor 25 mg b i d     -  patient not on ACE/ARB/Aldactone secondary to chronic kidney disease stage 4    -  monitor I&O, daily weights electrolytes    -  continue monitor vital signs and adjust heart failure medications as patient needs it    -  encourage low-sodium diet     3  Hypertension:  Blood pressure stable  Will continue to monitor    4  Dementia:    5  Coronary artery disease:  History of RCA stent in 2012 and LAD stent in 2013    -  no complaints of chest pain during admission    -  continue optimal medical therapy    6  Infestation with bedbugs/rash:  Resolving    7  Chronic kidney disease stage 4:  Patient's baseline creatinine has been anywhere in the range of 1 4-2    Subjective / Objective:   Patient seen and examined  She is resting comfortably in bed  Case discussed with patient's primary nurse  She states patient can become agitated when they attempt to do vital signs, but for most of the day she has just been lying in bed    They also notes that patient has not been eating and has been somewhat difficult to get medications and   IV fluids started by primary team     Vitals: Blood pressure 141/83, pulse 89, temperature 98 3 °F (36 8 °C), temperature source Axillary, resp  rate 20, height 5' 7" (1 702 m), weight 90 3 kg (199 lb), SpO2 95 %, not currently breastfeeding  Vitals:    10/02/22 0547 10/03/22 0553   Weight: 89 9 kg (198 lb 1 6 oz) 90 3 kg (199 lb)     Body mass index is 31 17 kg/m²  BP Readings from Last 3 Encounters:   10/03/22 141/83   09/02/22 130/70   03/11/22 143/74     Orthostatic Blood Pressures    Flowsheet Row Most Recent Value   Blood Pressure 141/83 filed at 10/03/2022 0700   Patient Position - Orthostatic VS Lying filed at 10/03/2022 0700        I/O       10/01 0701  10/02 0700 10/02 0701  10/03 0700 10/03 0701  10/04 0700    P  O  290      I V  (mL/kg) 292 4 (3 3)      Total Intake(mL/kg) 582 4 (6 5)      Urine (mL/kg/hr) 1534 (0 7) 767 (0 4)     Total Output 1534 767     Net -951 6 -767                Invasive Devices  Report    Peripheral Intravenous Line  Duration           Peripheral IV 09/30/22 Right;Ventral (anterior) Forearm 3 days          Drain  Duration           External Urinary Catheter 1 day                  Intake/Output Summary (Last 24 hours) at 10/3/2022 1053  Last data filed at 10/3/2022 0152  Gross per 24 hour   Intake --   Output 767 ml   Net -767 ml         Physical Exam:   Physical Exam  Vitals and nursing note reviewed  HENT:      Right Ear: External ear normal       Left Ear: External ear normal       Nose: Nose normal    Eyes:      General:         Right eye: No discharge  Left eye: No discharge  Cardiovascular:      Rate and Rhythm: Normal rate and regular rhythm  Pulses: Normal pulses  Heart sounds: Normal heart sounds  Pulmonary:      Effort: Pulmonary effort is normal  No respiratory distress  Breath sounds: Normal breath sounds  Abdominal:      General: Bowel sounds are normal  There is no distension  Palpations: Abdomen is soft  Musculoskeletal:      Right lower leg: No edema  Left lower leg: No edema  Skin:     General: Skin is warm  Capillary Refill: Capillary refill takes less than 2 seconds        Comments: Petechial rash on skin clearing           Medications/ Allergies:     Current Facility-Administered Medications   Medication Dose Route Frequency Provider Last Rate    acetaminophen  650 mg Oral Q6H PRN Keith Rae PA-C      apixaban  5 mg Oral BID Valeria Coleman CRNP      aspirin  81 mg Oral Daily Solange VecHENRIQUE norris      bumetanide  1 mg Oral Daily Navtej Bryan, DO      hydrocortisone   Topical 4x Daily PRN Keith Rae PA-C      influenza vaccine  0 7 mL Intramuscular Once Ann Espana MD      ipratropium  2 spray Nasal Q12H Keith Rae PA-C      metoprolol tartrate  50 mg Oral BID Ann Espana MD      ondansetron  4 mg Intravenous Q6H PRN Keith Rae PA-C      pneumococcal 23-valent polysaccharide vaccine  0 5 mL Subcutaneous Prior to discharge Ann Espana MD      sodium chloride  75 mL/hr Intravenous Continuous Ann Espana MD 75 mL/hr (10/03/22 0289)    traMADol  50 mg Oral Q6H PRN Solange HENRIQUE Martinez       acetaminophen, 650 mg, Q6H PRN  hydrocortisone, , 4x Daily PRN  ondansetron, 4 mg, Q6H PRN  pneumococcal 23-valent polysaccharide vaccine, 0 5 mL, Prior to discharge  traMADol, 50 mg, Q6H PRN      Allergies   Allergen Reactions    Bee Pollen Allergic Rhinitis     Runny nose    Pollen Extract      Runny nose       VTE Pharmacologic Prophylaxis:   Sequential compression device (Venodyne)     Labs:   Troponins:  Results from last 7 days   Lab Units 09/28/22  2308 09/28/22  2034   HSTNI D2 ng/L  --  0   HSTNI D4 ng/L -1  --      CBC with diff:  Results from last 7 days   Lab Units 10/02/22  1229 10/01/22  1222 09/29/22  0523 09/28/22  1848   WBC Thousand/uL 7 68 7 01 6 43 6 40   HEMOGLOBIN g/dL 12 4 13 6 11 5 12 4   HEMATOCRIT % 39 0 42 0 36 7 40 0   MCV fL 99* 97 99* 100*   PLATELETS Thousands/uL 188 197 170 177   MCH pg 31 4 31 3 31 1 31 1 MCHC g/dL 31 8 32 4 31 3* 31 0*   RDW % 16 6* 16 1* 16 2* 16 3*   MPV fL 10 4 10 6 11 0 10 9   NRBC AUTO /100 WBCs 0 0 0 0     CMP:  Results from last 7 days   Lab Units 10/03/22  0944 10/02/22  1229 10/01/22  1222 09/29/22  0523 09/28/22  1848   SODIUM mmol/L 142 140 138 141 142   POTASSIUM mmol/L 3 7 3 8 3 9 4 0 4 4   CHLORIDE mmol/L 103 102 100 106 106   CO2 mmol/L 30 31 27 26 28   ANION GAP mmol/L 9 7 11 9 8   BUN mg/dL 33* 32* 25 19 21   CREATININE mg/dL 1 82* 2 10* 1 80* 1 73* 1 76*   CALCIUM mg/dL 8 7 8 5 8 8 8 3 8 7   AST U/L 87* 84* 66*  --  19   ALT U/L 35 29 24  --  16   ALK PHOS U/L 68 72 84  --  86   TOTAL PROTEIN g/dL 6 9 6 9 7 6  --  7 5   ALBUMIN g/dL 2 8* 2 9* 3 4*  --  3 4*   TOTAL BILIRUBIN mg/dL 0 89 0 79 0 86  --  0 61   EGFR ml/min/1 73sq m 26 22 26 28 27     Magnesium:  Results from last 7 days   Lab Units 10/02/22  1229   MAGNESIUM mg/dL 2 2     TSH:  Results from last 7 days   Lab Units 09/30/22  0946   TSH 3RD GENERATON uIU/mL 1 846       Imaging & Testing   I have personally reviewed pertinent reports  XR chest 1 view portable    Result Date: 9/29/2022  Narrative: CHEST INDICATION:   edema/hx of CHF  COMPARISON:  March 11, 2022  EXAM PERFORMED/VIEWS:  XR CHEST PORTABLE FINDINGS: Borderline enlargement of the cardiac silhouette  Mediastinal contours are within normal limits  Left costophrenic sulcus is not included in the field-of-view  No focal consolidation, pleural effusion or pneumothorax  Osseous structures appear within normal limits for patient age  Impression: No active pulmonary disease  Workstation performed: [id number]     XR knee 4+ vw right injury    Result Date: 9/29/2022  Narrative: RIGHT KNEE INDICATION:   trauma  COMPARISON:  None VIEWS:  XR KNEE 4+ VW RIGHT INJURY FINDINGS: There is no acute fracture or dislocation  There is no joint effusion  Mild osteoarthritis of medial and patellofemoral compartments with small marginal osteophytes   No lytic or blastic osseous lesion  Atherosclerotic calcifications  Mild prepatellar soft tissue swelling  Impression: No acute osseous abnormality  Mild osteoarthritis  Mild prepatellar soft tissue swelling  Workstation performed: [id number]     VAS lower limb venous duplex study, complete bilateral    Result Date: 9/29/2022  Narrative:  [facility] REPORT CLINICAL: Indications: Patient presents with bilateral lower extremity edema  Operative History: Cardiac stent x1 Risk Factors The patient has history of HTN and CAD  CONCLUSION:  Impression: RIGHT LOWER LIMB: No evidence of acute or chronic deep vein thrombosis  No evidence of superficial thrombophlebitis noted  Doppler evaluation shows a normal response to augmentation maneuvers  Popliteal, posterior tibial and anterior tibial arterial Doppler waveforms are biphasic  LEFT LOWER LIMB: No evidence of acute or chronic deep vein thrombosis  No evidence of superficial thrombophlebitis noted  Doppler evaluation shows a normal response to augmentation maneuvers  Popliteal, posterior tibial and anterior tibial arterial Doppler waveforms are biphasic  Technically difficult/limited study secondary to sensitivity to probe pressure  Technical findings were given to Dr Alan Diop  SIGNATURE: Electronically Signed by: Elle Louie on 2022-09-29 02:23:59 PM    Echo follow up/limited w/ contrast if indicated    Result Date: 9/30/2022  Narrative: Candi Thompson  Left Ventricle: Left ventricular cavity size is normal  Wall thickness is mildly increased  The left ventricular ejection fraction is 50% by visual estimation  Systolic function is low normal  Although no diagnostic regional wall motion abnormality was identified, this possibility cannot be completely excluded on the basis of this study    Left Atrium: The atrium is severely dilated (>48 mL/m2)    Right Atrium: The atrium is moderately dilated    Mitral Valve: There is moderate regurgitation    Tricuspid Valve:  There is moderate regurgitation  The right ventricular systolic pressure is normal  The estimated right ventricular systolic pressure is 77 85 mmHg    Compared to previous exam, there has been no significant change  [facility]  Cardiology      "This note was completed in part utilizing m-modal fluency direct voice recognition software  Grammatical errors, random word insertion, spelling mistakes, and incomplete sentences may be an occasional consequence of the system secondary to software limitations, ambient noise and hardware issues  Please read the chart carefully and recognize, using context, where substitutions have occurred    If you have any questions or concerns about the context, text or information contained within the body of this dictation, please contact myself, the provider, for further clarification "

## 2022-10-03 NOTE — PLAN OF CARE
Problem: MOBILITY - ADULT  Goal: Maintain or return to baseline ADL function  Description: INTERVENTIONS:  -  Assess patient's ability to carry out ADLs; assess patient's baseline for ADL function and identify physical deficits which impact ability to perform ADLs (bathing, care of mouth/teeth, toileting, grooming, dressing, etc )  - Assess/evaluate cause of self-care deficits   - Assess range of motion  - Assess patient's mobility; develop plan if impaired  - Assess patient's need for assistive devices and provide as appropriate  - Encourage maximum independence but intervene and supervise when necessary  - Involve family in performance of ADLs  - Assess for home care needs following discharge   - Consider OT consult to assist with ADL evaluation and planning for discharge  - Provide patient education as appropriate  Outcome: Progressing  Goal: Maintains/Returns to pre admission functional level  Description: INTERVENTIONS:  - Perform BMAT or MOVE assessment daily    - Set and communicate daily mobility goal to care team and patient/family/caregiver  - Collaborate with rehabilitation services on mobility goals if consulted  - Perform Range of Motion 2 times a day  - Reposition patient every 2 hours    - Dangle patient 5 times a day  - Stand patient 3 times a day  - Ambulate patient 3 times a day  - Out of bed to chair 4 times a day   - Out of bed for meals 3 times a day  - Out of bed for toileting  - Record patient progress and toleration of activity level   Outcome: Progressing

## 2022-10-03 NOTE — PROGRESS NOTES
Katharine 45 at the end  Progress Note - Alba Bryn 1945, 68 y o  female MRN: [id number]  Unit/Bed#: [address] [place] [address] Encounter: [id number]  Primary Care Provider: Mary Osborne MD   Date and time admitted to hospital: 9/28/2022  5:56 PM    Acute exacerbation of CHF (congestive heart failure) ([facility] [address] )  Assessment & Plan  Wt Readings from Last 3 Encounters:   09/30/22 90 7 kg (200 lb)   09/02/22 89 4 kg (197 lb)   03/11/22 86 2 kg (190 lb)     Acute CHF likely Diastolic in the setting of Hypertension evidenced by BNP 10,385, SOB and leg edema, treated with IV Bumex BID, daily weights, I&Os and fluid restriction      · Was previously on torsemide 20mg but stopped secondary to drug reaction (not a true reaction), has been on ethacrynic acid for 5 days with no improvement, currently on Bumex  · Echo EF 50%,Left Atrium: The atrium is severely dilated (>48 mL/m2)  Right Atrium:   Mitral Valve: There is moderate regurgitation  Tricuspid/mitral Valve: There is Gent in moderate regurgitation  · Gentle hydration           * PAF (paroxysmal atrial fibrillation) ([facility])  Assessment & Plan  Results, Asymptomatic,  tele continued  Status post Cardizem, converted to NSR  Continue metoprolol to 50 mg B i d   Cardio  recs: to continue with increased metoprolol initiate anti-coag continue diuresing          Dementia with behavioral disturbance  Assessment & Plan  Pt lives alone, daughter concerned she is unable to care for herself  · No longer on medication  · CM consult for long-term placement  · Consider p r n  geodon for agitation    Mitral valve regurgitation due to and not concurrent with acute myocardial infarction  Assessment & Plan  Noted on echo,   Moderate MV-regurg  TV-regurg  continue plan as    Infestation by bed bug  Assessment & Plan  Decontaminated in ED  Cortisone daily p r n      Rash and nonspecific skin eruption  Assessment & Plan  Improving,  Patient with rash over body that has been presents for a few weeks  · Seen by dermatologist and told she has sulfa allergy, reaction to torsemide, rash actually 2/2 Bed bugs  · Hydrocortisone cream, benadryl prn    CKD (chronic kidney disease) stage 4, GFR 15-29 ml/min [facility])  Assessment & Plan  Lab Results   Component Value Date    EGFR 26 10/01/2022    EGFR 28 09/29/2022    EGFR 27 09/28/2022    CREATININE 1 80 (H) 10/01/2022    CREATININE 1 73 (H) 09/29/2022    CREATININE 1 76 (H) 09/28/2022     Creatinine at baseline, monitor with BMP  Continue to diurese and replenish to avoid renal damage  Avoid nephrotoxic    Coronary artery disease involving native coronary artery of native heart without angina pectoris  Assessment & Plan  Asymptomatic,  S/p PCI in 2012 with NARGIS to RCA, 2013 NARGIS to LAD    · Troponin trend negative  · Continue metoprolol    Essential hypertension  Assessment & Plan  Chronic, Continue metoprolol 50mg bid        VTE Pharmacologic Prophylaxis: VTE Score: 4 Moderate Risk (Score 3-4) - Pharmacological DVT Prophylaxis Ordered: heparin  Patient Centered Rounds: I performed bedside rounds with nursing staff today  Discussions with Specialists or Other Care Team Provider:  Cardiology, case management    Education and Discussions with Family / Patient: Updated  (daughter) via phone  Antonietta Hinds hydrodissect the time failure colitis (basal you updated get to [facility] him [facility] to a pack    Time Spent for Care: 30 minutes  More than 50% of total time spent on counseling and coordination of care as described above  Current Length of Stay: 4 day(s)  Current Patient Status: Inpatient   Certification Statement: The patient will continue to require additional inpatient hospital stay due to Clinical course of care  Discharge Plan: Anticipate discharge in 24-48 hrs to discharge location to be determined pending rehab evaluations      Code Status: Level 1 - Full Code    Subjective:   Patient seen and examined at bedside and was fatigued this morning, nursing reported no episodes overnight in no need for restraints overnight  Patient's daughter wanted patient to get Pneumovax prior to discharge and flu vaccine  Objective:     Vitals:   Temp (24hrs), Av °F (36 7 °C), Min:97 5 °F (36 4 °C), Max:98 4 °F (36 9 °C)    Temp:  [97 5 °F (36 4 °C)-98 4 °F (36 9 °C)] 97 5 °F (36 4 °C)  HR:  [67-89] 75  Resp:  [16-20] 19  BP: (108-150)/(57-83) 121/71  SpO2:  [94 %-99 %] 95 %  Body mass index is 31 17 kg/m²  Input and Output Summary (last 24 hours): Intake/Output Summary (Last 24 hours) at 10/3/2022 1628  Last data filed at 10/3/2022 0152  Gross per 24 hour   Intake --   Output 200 ml   Net -200 ml       Physical Exam:   Physical Exam  Vitals and nursing note reviewed  Constitutional:       General: She is not in acute distress  Appearance: She is well-developed  She is obese  She is not ill-appearing  Comments: Fatigued   HENT:      Head: Normocephalic and atraumatic  Eyes:      Conjunctiva/sclera: Conjunctivae normal    Cardiovascular:      Rate and Rhythm: Normal rate and regular rhythm  Heart sounds: No murmur heard  Pulmonary:      Effort: Pulmonary effort is normal  No respiratory distress  Breath sounds: Normal breath sounds  No wheezing  Abdominal:      Palpations: Abdomen is soft  Tenderness: There is no abdominal tenderness  Musculoskeletal:      Cervical back: Neck supple  Right lower leg: No edema  Left lower leg: No edema  Skin:     General: Skin is warm and dry  Neurological:      Mental Status: She is alert  Mental status is at baseline     Psychiatric:         Mood and Affect: Mood normal          Behavior: Behavior normal           Additional Data:     Labs:  Results from last 7 days   Lab Units 10/02/22  1229   WBC Thousand/uL 7 68   HEMOGLOBIN g/dL 12 4   HEMATOCRIT % 39 0   PLATELETS Thousands/uL 188   NEUTROS PCT % 79*   LYMPHS PCT % 12*   MONOS PCT % 9   EOS PCT % 0 Results from last 7 days   Lab Units 10/03/22  0944   SODIUM mmol/L 142   POTASSIUM mmol/L 3 7   CHLORIDE mmol/L 103   CO2 mmol/L 30   BUN mg/dL 33*   CREATININE mg/dL 1 82*   ANION GAP mmol/L 9   CALCIUM mg/dL 8 7   ALBUMIN g/dL 2 8*   TOTAL BILIRUBIN mg/dL 0 89   ALK PHOS U/L 68   ALT U/L 35   AST U/L 87*   GLUCOSE RANDOM mg/dL 90         Results from last 7 days   Lab Units 10/02/22  0939   POC GLUCOSE mg/dl 267*               Lines/Drains:  Invasive Devices  Report    Peripheral Intravenous Line  Duration           Peripheral IV 09/30/22 Right;Ventral (anterior) Forearm 3 days          Drain  Duration           External Urinary Catheter 1 day                  Telemetry:  Telemetry Orders (From admission, onward)             48 Hour Telemetry Monitoring  Continuous x 48 hours        References:    Telemetry Guidelines   Question:  Reason for 48 Hour Telemetry  Answer:  Acute Decompensated CHF (continuous diuretic infusion or total diuretic dose > 200 mg daily, associated electrolyte derangement, ionotropic drip, history of ventricular arrhythmia, or new EF <35%)                 Telemetry Reviewed: Normal Sinus Rhythm  Indication for Continued Telemetry Use: Arrthymias requiring medical therapy             Imaging: Reviewed radiology reports from this admission including: vas    Recent Cultures (last 7 days):         Last 24 Hours Medication List:   Current Facility-Administered Medications   Medication Dose Route Frequency Provider Last Rate    acetaminophen  650 mg Oral Q6H PRN Daniela Cody PA-C      apixaban  5 mg Oral BID BRADLY Reaves      aspirin  81 mg Oral Daily Solange HENRIQUE Martinez      hydrocortisone   Topical 4x Daily PRN Daniela Cody PA-C      ipratropium  2 spray Nasal Q12H Solange Martinez PA-C      metoprolol tartrate  50 mg Oral BID Hector Perera MD      ondansetron  4 mg Intravenous Q6H PRN Daniela Cody PA-C      pneumococcal 23-valent polysaccharide vaccine 0 5 mL Subcutaneous Prior to discharge Geovani Giraldo MD      sodium chloride  75 mL/hr Intravenous Continuous Geovani Giraldo MD 75 mL/hr (10/03/22 6654)    traMADol  50 mg Oral Q6H PRN Christina Fermin PA-C          Today, Patient Was Seen By: Geovani Giraldo    **Please Note: This note may have been constructed using a voice recognition system  **

## 2022-10-04 LAB
ANION GAP SERPL CALCULATED.3IONS-SCNC: 9 MMOL/L (ref 4–13)
BUN SERPL-MCNC: 30 MG/DL (ref 5–25)
CALCIUM SERPL-MCNC: 8.2 MG/DL (ref 8.3–10.1)
CHLORIDE SERPL-SCNC: 103 MMOL/L (ref 96–108)
CO2 SERPL-SCNC: 27 MMOL/L (ref 21–32)
CREAT SERPL-MCNC: 1.51 MG/DL (ref 0.6–1.3)
ERYTHROCYTE [DISTWIDTH] IN BLOOD BY AUTOMATED COUNT: 16.1 % (ref 11.6–15.1)
GFR SERPL CREATININE-BSD FRML MDRD: 33 ML/MIN/1.73SQ M
GLUCOSE SERPL-MCNC: 85 MG/DL (ref 65–140)
HCT VFR BLD AUTO: 39.6 % (ref 34.8–46.1)
HGB BLD-MCNC: 12.5 G/DL (ref 11.5–15.4)
MAGNESIUM SERPL-MCNC: 2.1 MG/DL (ref 1.6–2.6)
MCH RBC QN AUTO: 31.3 PG (ref 26.8–34.3)
MCHC RBC AUTO-ENTMCNC: 31.6 G/DL (ref 31.4–37.4)
MCV RBC AUTO: 99 FL (ref 82–98)
PLATELET # BLD AUTO: 202 THOUSANDS/UL (ref 149–390)
PMV BLD AUTO: 10.9 FL (ref 8.9–12.7)
POTASSIUM SERPL-SCNC: 3.8 MMOL/L (ref 3.5–5.3)
RBC # BLD AUTO: 3.99 MILLION/UL (ref 3.81–5.12)
SODIUM SERPL-SCNC: 139 MMOL/L (ref 135–147)
WBC # BLD AUTO: 6.06 THOUSAND/UL (ref 4.31–10.16)

## 2022-10-04 PROCEDURE — 80048 BASIC METABOLIC PNL TOTAL CA: CPT | Performed by: INTERNAL MEDICINE

## 2022-10-04 PROCEDURE — 99232 SBSQ HOSP IP/OBS MODERATE 35: CPT | Performed by: INTERNAL MEDICINE

## 2022-10-04 PROCEDURE — 83735 ASSAY OF MAGNESIUM: CPT | Performed by: INTERNAL MEDICINE

## 2022-10-04 PROCEDURE — 97110 THERAPEUTIC EXERCISES: CPT

## 2022-10-04 PROCEDURE — 85027 COMPLETE CBC AUTOMATED: CPT | Performed by: INTERNAL MEDICINE

## 2022-10-04 RX ORDER — SENNOSIDES 8.6 MG
2 TABLET ORAL
Status: DISCONTINUED | OUTPATIENT
Start: 2022-10-04 | End: 2022-10-07 | Stop reason: HOSPADM

## 2022-10-04 RX ORDER — BUMETANIDE 1 MG/1
1 TABLET ORAL DAILY
Status: DISCONTINUED | OUTPATIENT
Start: 2022-10-04 | End: 2022-10-04

## 2022-10-04 RX ORDER — POLYETHYLENE GLYCOL 3350 17 G/17G
17 POWDER, FOR SOLUTION ORAL DAILY PRN
Status: DISCONTINUED | OUTPATIENT
Start: 2022-10-04 | End: 2022-10-07 | Stop reason: HOSPADM

## 2022-10-04 RX ORDER — DOCUSATE SODIUM 100 MG/1
100 CAPSULE, LIQUID FILLED ORAL 2 TIMES DAILY
Status: DISCONTINUED | OUTPATIENT
Start: 2022-10-04 | End: 2022-10-07 | Stop reason: HOSPADM

## 2022-10-04 RX ORDER — BUMETANIDE 1 MG/1
1 TABLET ORAL DAILY
Status: DISCONTINUED | OUTPATIENT
Start: 2022-10-04 | End: 2022-10-06

## 2022-10-04 RX ADMIN — APIXABAN 5 MG: 5 TABLET, FILM COATED ORAL at 09:54

## 2022-10-04 RX ADMIN — ASPIRIN 81 MG CHEWABLE TABLET 81 MG: 81 TABLET CHEWABLE at 09:54

## 2022-10-04 RX ADMIN — METOPROLOL TARTRATE 50 MG: 50 TABLET, FILM COATED ORAL at 09:54

## 2022-10-04 RX ADMIN — SENNOSIDES 17.2 MG: 8.6 TABLET, FILM COATED ORAL at 21:01

## 2022-10-04 RX ADMIN — DOCUSATE SODIUM 100 MG: 100 CAPSULE, LIQUID FILLED ORAL at 17:21

## 2022-10-04 RX ADMIN — TRAMADOL HYDROCHLORIDE 50 MG: 50 TABLET, COATED ORAL at 10:55

## 2022-10-04 RX ADMIN — APIXABAN 5 MG: 5 TABLET, FILM COATED ORAL at 17:21

## 2022-10-04 RX ADMIN — METOPROLOL TARTRATE 50 MG: 50 TABLET, FILM COATED ORAL at 20:03

## 2022-10-04 RX ADMIN — TRAMADOL HYDROCHLORIDE 50 MG: 50 TABLET, COATED ORAL at 21:01

## 2022-10-04 RX ADMIN — BUMETANIDE 1 MG: 1 TABLET ORAL at 14:00

## 2022-10-04 NOTE — PROGRESS NOTES
[facility] Progress Note  Patient: Andrea Torrez 68 y o  female   MRN: [id number]  PCP: Payam Shetty MD  Unit/Bed#: [address] [place] [address] Encounter: [id number]  Date Of Visit: 10/04/22    Problem List:    Principal Problem:    PAF (paroxysmal atrial fibrillation) ([place] [id number] )  Active Problems:    Acute exacerbation of CHF (congestive heart failure) ([place] [address] )    Coronary artery disease involving native coronary artery of native heart without angina pectoris    CKD (chronic kidney disease) stage 4, GFR 15-29 ml/min ([facility])    Dementia with behavioral disturbance    Mitral valve regurgitation due to and not concurrent with acute myocardial infarction    Essential hypertension    Rash and nonspecific skin eruption    Infestation by bed bug      Assessment & Plan:    Acute exacerbation of CHF (congestive heart failure) ([facility])  Assessment & Plan  Wt Readings from Last 3 Encounters:   10/04/22 93 kg (205 lb)   09/02/22 89 4 kg (197 lb)   03/11/22 86 2 kg (190 lb)     Acute CHF likely Diastolic in the setting of Hypertension, AFib evidenced by BNP 10,385, SOB and leg edema, treated with IV Bumex BID, daily weights, I&Os and fluid restriction  Was previously on torsemide 20mg but stopped secondary to drug reaction (not a true reaction), has been on ethacrynic acid for 5 days with no improvement,  Echo EF 50%,Left Atrium: The atrium is severely dilated (>48 mL/m2)  Right Atrium:   Mitral Valve: There is moderate regurgitation  Tricuspid/mitral Valve:  moderate regurgitation    · Continue metoprolol  · Not on Ace/ARB/Aldactone  · Will discontinue IV fluid, restart Bumex 1 mg daily  · Monitor response  · Daily weight, intake output      * PAF (paroxysmal atrial fibrillation) ([facility])  Assessment & Plan  Status post Cardizem, converted to NSR  Cardiology following, input appreciated  · Continue metoprolol   · On Eliquis    Mitral valve regurgitation due to and not concurrent with acute myocardial infarction  Assessment & Plan  Noted on echo,   Moderate MV-regurg  TV-regurg  Continue current cardiac meds  Restarted low-dose Bumex    Dementia with behavioral disturbance  Assessment & Plan  Pt lives alone, daughter concerned she is unable to care for herself  · No longer on medication  · Supervised living, discharge planning to skilled nursing facility    CKD (chronic kidney disease) stage 4, GFR 15-29 ml/min ([facility])  Assessment & Plan  Creatinine at baseline, monitor with BMP  Monitor with diuresis  Avoid nephrotoxic    Coronary artery disease involving native coronary artery of native heart without angina pectoris  Assessment & Plan  Asymptomatic,  S/p PCI in 2012 with NARGIS to RCA, 2013 NARGIS to LAD    · Troponin trend negative  · Continue aspirin, metoprolol    Rash and nonspecific skin eruption  Assessment & Plan  Improving,  Patient with rash over body that has been presents for a few weeks  · Seen by dermatologist and told she has sulfa allergy, reaction to torsemide, rash actually 2/2 Bed bugs  · Hydrocortisone cream, benadryl prn    Essential hypertension  Assessment & Plan  Chronic, Continue metoprolol 50mg bid    Infestation by bed bug  Assessment & Plan  Decontaminated in ED  Cortisone daily p r n  VTE Pharmacologic Prophylaxis: VTE Score: 4 Moderate Risk (Score 3-4) - Pharmacological DVT Prophylaxis Ordered: apixaban (Eliquis)  Patient Centered Rounds: I performed bedside rounds with nursing staff today  Discussions with Specialists or Other Care Team Provider:  Cardiology    Education and Discussions with Family / Patient: Updated  (daughter) at bedside  Time Spent for Care: 45 minutes  More than 50% of total time spent on counseling and coordination of care as described above      Current Length of Stay: 5 day(s)  Current Patient Status: Inpatient   Certification Statement: The patient will continue to require additional inpatient hospital stay due to CHF  Discharge Plan: Anticipate discharge in 24-48 hrs to rehab facility  Code Status: Level 1 - Full Code    Subjective:   Sitting up in bed, having lunch  Denies any chest pain shortness of breath      Objective:     Vitals:   Temp (24hrs), Av 9 °F (36 6 °C), Min:97 5 °F (36 4 °C), Max:98 3 °F (36 8 °C)    Temp:  [97 5 °F (36 4 °C)-98 3 °F (36 8 °C)] 97 9 °F (36 6 °C)  HR:  [66-94] 89  Resp:  [18-19] 18  BP: (121-139)/(71-93) 128/93  SpO2:  [92 %-96 %] 96 %  Body mass index is 32 11 kg/m²  Input and Output Summary (last 24 hours): Intake/Output Summary (Last 24 hours) at 10/4/2022 1432  Last data filed at 10/4/2022 1301  Gross per 24 hour   Intake 900 ml   Output 3449 ml   Net -2549 ml       Physical Exam:   Physical Exam  Constitutional:       General: She is not in acute distress  Appearance: She is obese  She is ill-appearing (Chronically)  HENT:      Head: Normocephalic and atraumatic  Cardiovascular:      Rate and Rhythm: Normal rate  Pulmonary:      Effort: Pulmonary effort is normal  No respiratory distress  Breath sounds: Normal breath sounds  No wheezing or rales  Abdominal:      General: Bowel sounds are normal  There is no distension  Palpations: Abdomen is soft  Tenderness: There is no abdominal tenderness  There is no guarding or rebound  Skin:     General: Skin is warm and dry  Findings: No rash  Neurological:      Mental Status: She is alert           Additional Data:     Labs:  Results from last 7 days   Lab Units 10/04/22  0607 10/02/22  1229   WBC Thousand/uL 6 06 7 68   HEMOGLOBIN g/dL 12 5 12 4   HEMATOCRIT % 39 6 39 0   PLATELETS Thousands/uL 202 188   NEUTROS PCT %  --  79*   LYMPHS PCT %  --  12*   MONOS PCT %  --  9   EOS PCT %  --  0     Results from last 7 days   Lab Units 10/04/22  0607 10/03/22  0944   SODIUM mmol/L 139 142   POTASSIUM mmol/L 3 8 3 7   CHLORIDE mmol/L 103 103   CO2 mmol/L 27 30   BUN mg/dL 30* 33*   CREATININE mg/dL 1 51* 1 82*   ANION GAP mmol/L 9 9   CALCIUM mg/dL 8 2* 8 7 ALBUMIN g/dL  --  2 8*   TOTAL BILIRUBIN mg/dL  --  0 89   ALK PHOS U/L  --  68   ALT U/L  --  35   AST U/L  --  87*   GLUCOSE RANDOM mg/dL 85 90         Results from last 7 days   Lab Units 10/02/22  0939   POC GLUCOSE mg/dl 267*               Lines/Drains:  Invasive Devices  Report    Peripheral Intravenous Line  Duration           Peripheral IV 09/30/22 Right;Ventral (anterior) Forearm 4 days          Drain  Duration           External Urinary Catheter <1 day                  Telemetry:  Telemetry Orders (From admission, onward)             48 Hour Telemetry Monitoring  Continuous x 48 hours        References:    Telemetry Guidelines   Question:  Reason for 48 Hour Telemetry  Answer:  Arrhythmias Requiring Medical Therapy (eg  SVT, Vtach/fib, Bradycardia, Uncontrolled A-fib)                 Telemetry Reviewed: Normal Sinus Rhythm  Indication for Continued Telemetry Use: Arrthymias requiring medical therapy             Imaging: Reviewed radiology reports from this admission including: chest xray    Recent Cultures (last 7 days):         Last 24 Hours Medication List:   Current Facility-Administered Medications   Medication Dose Route Frequency Provider Last Rate    acetaminophen  650 mg Oral Q6H PRN Randell Bang PA-C      apixaban  5 mg Oral BID BRADLY Britt      aspirin  81 mg Oral Daily Solange HENRIQUE Martinez      bumetanide  1 mg Oral Daily BRADLY Britt      docusate sodium  100 mg Oral BID Andrea Carmona MD      hydrocortisone   Topical 4x Daily PRN Randell Bang PA-C      ipratropium  2 spray Nasal Q12H Solange HENRIQUE Martinez      metoprolol tartrate  50 mg Oral BID Dasia Francis MD      ondansetron  4 mg Intravenous Q6H PRN Randell Bang PA-C      pneumococcal 23-valent polysaccharide vaccine  0 5 mL Subcutaneous Prior to discharge Dasia Francis MD      polyethylene glycol  17 g Oral Daily PRN Andrea Carmona MD      senna  2 tablet Oral HS Andrea Carmona MD  traMADol  50 mg Oral Q6H PRN Aniket Rangel PA-C          Today, Patient Was Seen By: Aure Gaona    ** Please Note: "This note has been constructed using a voice recognition system  Therefore there may be syntax, spelling, and/or grammatical errors   Please call if you have any questions  "**

## 2022-10-04 NOTE — PHYSICAL THERAPY NOTE
PT TREATMENT    10/04/22 1500   Note Type   Note Type Treatment   Pain Assessment   Pain Assessment Tool Panda-Baker FACES   Panda-Baker FACES Pain Rating 4  (Bilateral lower extremity pain with movement)   Restrictions/Precautions   Other Precautions Chair Alarm; Bed Alarm; Fall Risk;Pain;Cognitive   General   Chart Reviewed Yes   Family/Caregiver Present No   Cognition   Arousal/Participation Alert   Comments Patient confused and agitated at times with therapy attempts   Subjective   Subjective Patient states bilateral lower extremity pain with movement   Bed Mobility   Supine to Sit 2  Maximal assistance   Additional items Assist x 1   Sit to Supine 2  Maximal assistance   Additional items Assist x 1   Additional Comments Patient resistant to activity with confusion at this time   Transfers   Additional Comments Unable to assess standing and gait activity at this time due to patient confusion and agitation   Activity Tolerance   Activity Tolerance Treatment limited secondary to agitation;Patient limited by fatigue   Nurse Made Aware Yes   Exercises   Hamstring Stretch Supine;5 reps;PROM   Heelslides Supine;10 reps;AAROM   Hip Flexion Supine;10 reps;AAROM   Hip Abduction Supine;10 reps;AAROM   Ankle Pumps Supine;10 reps   Heel Cord Stretch Supine;5 reps;PROM   Bridging Supine;5 reps;AAROM   Assessment   Assessment Patient with confusion and limited participation with therapy patient patient requiring constant verbal cuing and encouragement  Patient will benefit from continued physical therapy with progression as tolerated  The patient's AM-PAC Basic Mobility Inpatient Short Form Raw Score is 8  A Raw score of less than or equal to 16 suggests the patient may benefit from discharge to post acute care rehab services  Please also refer to the recommendation of the Physical Therapist for safe discharge planning           Plan   Treatment/Interventions ADL retraining;Functional transfer training;LE strengthening/ROM; Therapeutic exercise; Endurance training;Cognitive reorientation;Patient/family training;Bed mobility;Gait training   PT Frequency Other (Comment)  (5 times per week)   Recommendation   PT Discharge Recommendation Post acute rehabilitation services   AM-PAC Basic Mobility Inpatient   Turning in Bed Without Bedrails 2   Lying on Back to Sitting on Edge of Flat Bed 2   Moving Bed to Chair 1   Standing Up From Chair 1   Walk in Room 1   Climb 3-5 Stairs 1   Basic Mobility Inpatient Raw Score 8   Turning Head Towards Sound 3   Follow Simple Instructions 2   Low Function Basic Mobility Raw Score 13   Low Function Basic Mobility Standardized Score 20 14   Highest Level Of Mobility   JH-HLM Goal 3: Sit at edge of bed   JH-HLM Achieved 3: Sit at edge of bed   Education   Patient Explanation/teachback used; Reinforcement needed   [employer] License Number  [employer] [id number]

## 2022-10-04 NOTE — PLAN OF CARE
Problem: MOBILITY - ADULT  Goal: Maintain or return to baseline ADL function  Description: INTERVENTIONS:  -  Assess patient's ability to carry out ADLs; assess patient's baseline for ADL function and identify physical deficits which impact ability to perform ADLs (bathing, care of mouth/teeth, toileting, grooming, dressing, etc )  - Assess/evaluate cause of self-care deficits   - Assess range of motion  - Assess patient's mobility; develop plan if impaired  - Assess patient's need for assistive devices and provide as appropriate  - Encourage maximum independence but intervene and supervise when necessary  - Involve family in performance of ADLs  - Assess for home care needs following discharge   - Consider OT consult to assist with ADL evaluation and planning for discharge  - Provide patient education as appropriate  Outcome: Progressing  Goal: Maintains/Returns to pre admission functional level  Description: INTERVENTIONS:  - Perform BMAT or MOVE assessment daily    - Set and communicate daily mobility goal to care team and patient/family/caregiver  - Collaborate with rehabilitation services on mobility goals if consulted  - Perform Range of Motion 2 times a day  - Reposition patient every 2 hours    - Dangle patient 2 times a day  - Stand patient 2 times a day  - Ambulate patient 2 times a day  - Out of bed to chair 2 times a day   - Out of bed for meals 2 times a day  - Out of bed for toileting  - Record patient progress and toleration of activity level   Outcome: Progressing     Problem: PAIN - ADULT  Goal: Verbalizes/displays adequate comfort level or baseline comfort level  Description: Interventions:  - Encourage patient to monitor pain and request assistance  - Assess pain using appropriate pain scale  - Administer analgesics based on type and severity of pain and evaluate response  - Implement non-pharmacological measures as appropriate and evaluate response  - Consider cultural and social influences on pain and pain management  - Notify physician/advanced practitioner if interventions unsuccessful or patient reports new pain  Outcome: Progressing     Problem: SAFETY ADULT  Goal: Maintain or return to baseline ADL function  Description: INTERVENTIONS:  -  Assess patient's ability to carry out ADLs; assess patient's baseline for ADL function and identify physical deficits which impact ability to perform ADLs (bathing, care of mouth/teeth, toileting, grooming, dressing, etc )  - Assess/evaluate cause of self-care deficits   - Assess range of motion  - Assess patient's mobility; develop plan if impaired  - Assess patient's need for assistive devices and provide as appropriate  - Encourage maximum independence but intervene and supervise when necessary  - Involve family in performance of ADLs  - Assess for home care needs following discharge   - Consider OT consult to assist with ADL evaluation and planning for discharge  - Provide patient education as appropriate  Outcome: Progressing  Goal: Patient will remain free of falls  Description: INTERVENTIONS:  - Educate patient/family on patient safety including physical limitations  - Instruct patient to call for assistance with activity   - Consult OT/PT to assist with strengthening/mobility   - Keep Call bell within reach  - Keep bed low and locked with side rails adjusted as appropriate  - Keep care items and personal belongings within reach  - Initiate and maintain comfort rounds  - Make Fall Risk Sign visible to staff  - Offer Toileting every 2 Hours, in advance of need  - Initiate/Maintain bed alarm  - Obtain necessary fall risk management equipment: call bell   - Apply yellow socks and bracelet for high fall risk patients  - Consider moving patient to room near nurses station  Outcome: Progressing     Problem: DISCHARGE PLANNING  Goal: Discharge to home or other facility with appropriate resources  Description: INTERVENTIONS:  - Identify barriers to discharge w/patient and caregiver  - Arrange for needed discharge resources and transportation as appropriate  - Identify discharge learning needs (meds, wound care, etc )  - Arrange for interpretive services to assist at discharge as needed  - Refer to Case Management Department for coordinating discharge planning if the patient needs post-hospital services based on physician/advanced practitioner order or complex needs related to functional status, cognitive ability, or social support system  Outcome: Progressing     Problem: CARDIOVASCULAR - ADULT  Goal: Maintains optimal cardiac output and hemodynamic stability  Description: INTERVENTIONS:  - Monitor I/O, vital signs and rhythm  - Monitor for S/S and trends of decreased cardiac output  - Administer and titrate ordered vasoactive medications to optimize hemodynamic stability  - Assess quality of pulses, skin color and temperature  - Assess for signs of decreased coronary artery perfusion  - Instruct patient to report change in severity of symptoms  Outcome: Progressing  Goal: Absence of cardiac dysrhythmias or at baseline rhythm  Description: INTERVENTIONS:  - Continuous cardiac monitoring, vital signs, obtain 12 lead EKG if ordered  - Administer antiarrhythmic and heart rate control medications as ordered  - Monitor electrolytes and administer replacement therapy as ordered  Outcome: Progressing     Problem: SKIN/TISSUE INTEGRITY - ADULT  Goal: Skin Integrity remains intact(Skin Breakdown Prevention)  Description: Assess:  -Perform Judah assessment every shift   -Clean and moisturize skin every shift   -Inspect skin when repositioning, toileting, and assisting with ADLS  -Assess under medical devices such as purewick every shift  -Assess extremities for adequate circulation and sensation     Bed Management:  -Have minimal linens on bed & keep smooth, unwrinkled  -Change linens as needed when moist or perspiring  -Avoid sitting or lying in one position for more than 1 hours while in bed  -Keep HOB at [address]:  -Offer bedside commode  -Assess for incontinence every 2 hours   -Use incontinent care products after each incontinent episode such as moisturizing wipes     Activity:  -Mobilize patient 2 times a day  -Encourage activity and walks on unit  -Encourage or provide ROM exercises   -Turn and reposition patient every 2 Hours  -Use appropriate equipment to lift or move patient in bed  -Instruct/ Assist with weight shifting every 60 min when out of bed in chair  -Consider limitation of chair time 1 hour intervals    Skin Care:  -Avoid use of baby powder, tape, friction and shearing, hot water or constrictive clothing  -Relieve pressure over bony prominences using foam wedges   -Do not massage red bony areas    Next Steps:  -Teach patient strategies to minimize risks such as shifting weight    -Consider consults to  interdisciplinary teams such as wound team   Outcome: Progressing     Problem: MUSCULOSKELETAL - ADULT  Goal: Maintain or return mobility to safest level of function  Description: INTERVENTIONS:  - Assess patient's ability to carry out ADLs; assess patient's baseline for ADL function and identify physical deficits which impact ability to perform ADLs (bathing, care of mouth/teeth, toileting, grooming, dressing, etc )  - Assess/evaluate cause of self-care deficits   - Assess range of motion  - Assess patient's mobility  - Assess patient's need for assistive devices and provide as appropriate  - Encourage maximum independence but intervene and supervise when necessary  - Involve family in performance of ADLs  - Assess for home care needs following discharge   - Consider OT consult to assist with ADL evaluation and planning for discharge  - Provide patient education as appropriate  Outcome: Progressing     Problem: Potential for Falls  Goal: Patient will remain free of falls  Description: INTERVENTIONS:  - Educate patient/family on patient safety including physical limitations  - Instruct patient to call for assistance with activity   - Consult OT/PT to assist with strengthening/mobility   - Keep Call bell within reach  - Keep bed low and locked with side rails adjusted as appropriate  - Keep care items and personal belongings within reach  - Initiate and maintain comfort rounds  - Make Fall Risk Sign visible to staff  - Offer Toileting every 2 Hours, in advance of need  - Initiate/Maintain bed alarm  - Obtain necessary fall risk management equipment: call bell   - Apply yellow socks and bracelet for high fall risk patients  - Consider moving patient to room near nurses station  Outcome: Progressing     Problem: Prexisting or High Potential for Compromised Skin Integrity  Goal: Skin integrity is maintained or improved  Description: INTERVENTIONS:  - Identify patients at risk for skin breakdown  - Assess and monitor skin integrity  - Assess and monitor nutrition and hydration status  - Monitor labs   - Assess for incontinence   - Turn and reposition patient  - Assist with mobility/ambulation  - Relieve pressure over bony prominences  - Avoid friction and shearing  - Provide appropriate hygiene as needed including keeping skin clean and dry  - Evaluate need for skin moisturizer/barrier cream  - Collaborate with interdisciplinary team   - Patient/family teaching  - Consider wound care consult   Outcome: Progressing     Problem: SAFETY,RESTRAINT: NV/NON-SELF DESTRUCTIVE BEHAVIOR  Goal: Remains free of harm/injury (restraint for non violent/non self-detsructive behavior)  Description: INTERVENTIONS:  - Instruct patient/family regarding restraint use   - Assess and monitor physiologic and psychological status   - Provide interventions and comfort measures to meet assessed patient needs   - Identify and implement measures to help patient regain control  - Assess readiness for release of restraint   Outcome: Progressing  Goal: Returns to optimal restraint-free functioning  Description: INTERVENTIONS:  - Assess the patient's behavior and symptoms that indicate continued need for restraint  - Identify and implement measures to help patient regain control  - Assess readiness for release of restraint   Outcome: Progressing     Problem: Nutrition/Hydration-ADULT  Goal: Nutrient/Hydration intake appropriate for improving, restoring or maintaining nutritional needs  Description: Monitor and assess patient's nutrition/hydration status for malnutrition  Collaborate with interdisciplinary team and initiate plan and interventions as ordered  Monitor patient's weight and dietary intake as ordered or per policy  Utilize nutrition screening tool and intervene as necessary  Determine patient's food preferences and provide high-protein, high-caloric foods as appropriate       INTERVENTIONS:  - Monitor oral intake, urinary output, labs, and treatment plans  - Assess nutrition and hydration status and recommend course of action  - Evaluate amount of meals eaten  - Assist patient with eating if necessary   - Allow adequate time for meals  - Recommend/ encourage appropriate diets, oral nutritional supplements, and vitamin/mineral supplements  - Order, calculate, and assess calorie counts as needed  - Recommend, monitor, and adjust tube feedings and TPN/PPN based on assessed needs  - Assess need for intravenous fluids  - Provide specific nutrition/hydration education as appropriate  - Include patient/family/caregiver in decisions related to nutrition  Outcome: Progressing

## 2022-10-05 ENCOUNTER — APPOINTMENT (OUTPATIENT)
Dept: RADIOLOGY | Facility: HOSPITAL | Age: 77
DRG: 291 | End: 2022-10-05
Payer: MEDICARE

## 2022-10-05 LAB
ANION GAP SERPL CALCULATED.3IONS-SCNC: 7 MMOL/L (ref 4–13)
BUN SERPL-MCNC: 35 MG/DL (ref 5–25)
CALCIUM SERPL-MCNC: 8.5 MG/DL (ref 8.3–10.1)
CHLORIDE SERPL-SCNC: 103 MMOL/L (ref 96–108)
CO2 SERPL-SCNC: 28 MMOL/L (ref 21–32)
CREAT SERPL-MCNC: 1.65 MG/DL (ref 0.6–1.3)
GFR SERPL CREATININE-BSD FRML MDRD: 29 ML/MIN/1.73SQ M
GLUCOSE SERPL-MCNC: 92 MG/DL (ref 65–140)
POTASSIUM SERPL-SCNC: 4.2 MMOL/L (ref 3.5–5.3)
SODIUM SERPL-SCNC: 138 MMOL/L (ref 135–147)

## 2022-10-05 PROCEDURE — 99232 SBSQ HOSP IP/OBS MODERATE 35: CPT | Performed by: INTERNAL MEDICINE

## 2022-10-05 PROCEDURE — 80048 BASIC METABOLIC PNL TOTAL CA: CPT | Performed by: INTERNAL MEDICINE

## 2022-10-05 PROCEDURE — 73522 X-RAY EXAM HIPS BI 3-4 VIEWS: CPT

## 2022-10-05 RX ADMIN — ACETAMINOPHEN 650 MG: 325 TABLET ORAL at 12:02

## 2022-10-05 RX ADMIN — APIXABAN 5 MG: 5 TABLET, FILM COATED ORAL at 08:01

## 2022-10-05 RX ADMIN — APIXABAN 5 MG: 5 TABLET, FILM COATED ORAL at 17:08

## 2022-10-05 RX ADMIN — SENNOSIDES 17.2 MG: 8.6 TABLET, FILM COATED ORAL at 22:05

## 2022-10-05 RX ADMIN — METOPROLOL TARTRATE 50 MG: 50 TABLET, FILM COATED ORAL at 08:01

## 2022-10-05 RX ADMIN — DOCUSATE SODIUM 100 MG: 100 CAPSULE, LIQUID FILLED ORAL at 08:01

## 2022-10-05 RX ADMIN — ASPIRIN 81 MG CHEWABLE TABLET 81 MG: 81 TABLET CHEWABLE at 08:01

## 2022-10-05 RX ADMIN — POLYETHYLENE GLYCOL 3350 17 G: 17 POWDER, FOR SOLUTION ORAL at 12:02

## 2022-10-05 RX ADMIN — DOCUSATE SODIUM 100 MG: 100 CAPSULE, LIQUID FILLED ORAL at 17:08

## 2022-10-05 RX ADMIN — BUMETANIDE 1 MG: 1 TABLET ORAL at 08:01

## 2022-10-05 RX ADMIN — METOPROLOL TARTRATE 50 MG: 50 TABLET, FILM COATED ORAL at 22:00

## 2022-10-05 NOTE — PROGRESS NOTES
[facility] Progress Note  Patient: Yarely Pa 68 y o  female   MRN: [id number]  PCP: Archana Murphy MD  Unit/Bed#: [address] [place] [address] Encounter: [id number]  Date Of Visit: 10/05/22    Problem List:    Principal Problem:    PAF (paroxysmal atrial fibrillation) ([place] [address] )  Active Problems:    Acute exacerbation of CHF (congestive heart failure) ([place] [address] )    Coronary artery disease involving native coronary artery of native heart without angina pectoris    CKD (chronic kidney disease) stage 4, GFR 15-29 ml/min ([facility])    Dementia with behavioral disturbance    Mitral valve regurgitation due to and not concurrent with acute myocardial infarction    Essential hypertension    Rash and nonspecific skin eruption    Arthritis    Infestation by bed bug      Assessment & Plan:    Acute exacerbation of CHF (congestive heart failure) ([facility])  Assessment & Plan  Wt Readings from Last 3 Encounters:   10/04/22 93 kg (205 lb)   09/02/22 89 4 kg (197 lb)   03/11/22 86 2 kg (190 lb)     Acute CHF likely Diastolic in the setting of Hypertension, AFib evidenced by BNP 10,385, SOB and leg edema, treated with IV Bumex BID, daily weights, I&Os and fluid restriction  Was previously on torsemide 20mg but stopped secondary to drug reaction (not a true reaction), has been on ethacrynic acid for 5 days with no improvement,  Echo EF 50%,Left Atrium: The atrium is severely dilated (>48 mL/m2)  Right Atrium:   Mitral Valve: There is moderate regurgitation  Tricuspid/mitral Valve:  moderate regurgitation    · Continue metoprolol  · Not on Ace/ARB/Aldactone  · Continue Bumex 1 mg daily  · Monitor response  · Daily weight, intake output      * PAF (paroxysmal atrial fibrillation) ([facility])  Assessment & Plan  Status post Cardizem, converted to NSR  Cardiology following, input appreciated  · Continue metoprolol   · On Eliquis    Mitral valve regurgitation due to and not concurrent with acute myocardial infarction  Assessment & Plan  Noted on echo, Moderate MV-regurg  TV-regurg  Continue current cardiac meds  Restarted low-dose Bumex, tolerating well  Follow-up labs    Dementia with behavioral disturbance  Assessment & Plan  Pt lives alone, daughter concerned she is unable to care for herself  · No longer on medication  · Supervised living, discharge planning to skilled nursing facility    CKD (chronic kidney disease) stage 4, GFR 15-29 ml/min ([facility])  Assessment & Plan  Creatinine at baseline, monitor with BMP  Monitor with diuresis  Avoid nephrotoxic    Coronary artery disease involving native coronary artery of native heart without angina pectoris  Assessment & Plan  Asymptomatic,  S/p PCI in 2012 with NARGIS to RCA, 2013 NARGIS to LAD    · Troponin trend negative  · Continue aspirin, metoprolol    Rash and nonspecific skin eruption  Assessment & Plan  Improving,  Patient with rash over body that has been presents for a few weeks  · Seen by dermatologist and told she has sulfa allergy, reaction to torsemide, rash actually 2/2 Bed bugs  · Hydrocortisone cream, benadryl prn    Essential hypertension  Assessment & Plan  Chronic, Continue metoprolol 50mg bid    Infestation by bed bug  Assessment & Plan  Decontaminated in ED  Cortisone daily p r n  Arthritis  Assessment & Plan  Right knee x-ray with evidence of arthritis  Will check x-ray of bilateral hip  PT/OT          VTE Pharmacologic Prophylaxis: VTE Score: 4 Moderate Risk (Score 3-4) - Pharmacological DVT Prophylaxis Ordered: apixaban (Eliquis)  Patient Centered Rounds: I performed bedside rounds with nursing staff today  Discussions with Specialists or Other Care Team Provider:  Cardiology    Education and Discussions with Family / Patient: Updated  (daughter) at bedside  Yesterday    Time Spent for Care: 45 minutes  More than 50% of total time spent on counseling and coordination of care as described above      Current Length of Stay: 6 day(s)  Current Patient Status: Inpatient Certification Statement: The patient will continue to require additional inpatient hospital stay due to CHF  Discharge Plan: Anticipate discharge later today or tomorrow to rehab facility  Code Status: Level 1 - Full Code    Subjective:   Noted to be comfortably lying in bed  Denies any chest pain or shortness of breath  Eating well  Reports diffuse arthritic pain mainly involving bilateral hip      Objective:     Vitals:   Temp (24hrs), Av 8 °F (36 6 °C), Min:97 6 °F (36 4 °C), Max:97 9 °F (36 6 °C)    Temp:  [97 6 °F (36 4 °C)-97 9 °F (36 6 °C)] 97 6 °F (36 4 °C)  HR:  [] 103  Resp:  [17] 17  BP: (112-123)/(70-81) 112/71  SpO2:  [93 %-95 %] 95 % on room air  Body mass index is 30 17 kg/m²  Input and Output Summary (last 24 hours): Intake/Output Summary (Last 24 hours) at 10/5/2022 1514  Last data filed at 10/5/2022 0030  Gross per 24 hour   Intake --   Output 1600 ml   Net -1600 ml       Physical Exam:   Physical Exam  Constitutional:       General: She is not in acute distress  Appearance: She is obese  She is ill-appearing (Chronically)  HENT:      Head: Normocephalic and atraumatic  Cardiovascular:      Rate and Rhythm: Normal rate  Pulmonary:      Effort: Pulmonary effort is normal  No respiratory distress  Breath sounds: Normal breath sounds  No wheezing or rales  Abdominal:      General: Bowel sounds are normal  There is no distension  Palpations: Abdomen is soft  Tenderness: There is no abdominal tenderness  There is no guarding or rebound  Musculoskeletal:      Comments: Mild pain with range of motion of the hip, diffuse arthritic changes   Skin:     General: Skin is warm and dry  Findings: No rash  Neurological:      Mental Status: She is alert           Additional Data:     Labs:  Results from last 7 days   Lab Units 10/04/22  0607 10/02/22  1229   WBC Thousand/uL 6 06 7 68   HEMOGLOBIN g/dL 12 5 12 4   HEMATOCRIT % 39 6 39 0   PLATELETS Thousands/uL 202 188   NEUTROS PCT %  --  79*   LYMPHS PCT %  --  12*   MONOS PCT %  --  9   EOS PCT %  --  0     Results from last 7 days   Lab Units 10/05/22  0459 10/04/22  0607 10/03/22  0944   SODIUM mmol/L 138   < > 142   POTASSIUM mmol/L 4 2   < > 3 7   CHLORIDE mmol/L 103   < > 103   CO2 mmol/L 28   < > 30   BUN mg/dL 35*   < > 33*   CREATININE mg/dL 1 65*   < > 1 82*   ANION GAP mmol/L 7   < > 9   CALCIUM mg/dL 8 5   < > 8 7   ALBUMIN g/dL  --   --  2 8*   TOTAL BILIRUBIN mg/dL  --   --  0 89   ALK PHOS U/L  --   --  68   ALT U/L  --   --  35   AST U/L  --   --  87*   GLUCOSE RANDOM mg/dL 92   < > 90    < > = values in this interval not displayed           Results from last 7 days   Lab Units 10/02/22  0939   POC GLUCOSE mg/dl 267*               Lines/Drains:  Invasive Devices  Report    Peripheral Intravenous Line  Duration           Peripheral IV 10/04/22 Left;Ventral (anterior) Forearm <1 day          Drain  Duration           External Urinary Catheter 1 day                  Telemetry:  Telemetry Orders (From admission, onward)             48 Hour Telemetry Monitoring  Continuous x 48 hours        References:    Telemetry Guidelines   Question:  Reason for 48 Hour Telemetry  Answer:  Arrhythmias Requiring Medical Therapy (eg  SVT, Vtach/fib, Bradycardia, Uncontrolled A-fib)                 Telemetry Reviewed: Normal Sinus Rhythm  Indication for Continued Telemetry Use: Arrthymias requiring medical therapy             Imaging: Reviewed radiology reports from this admission including: chest xray    Recent Cultures (last 7 days):         Last 24 Hours Medication List:   Current Facility-Administered Medications   Medication Dose Route Frequency Provider Last Rate    acetaminophen  650 mg Oral Q6H PRN Cathern HENRIQUE Horne      apixaban  5 mg Oral BID BRADLY Ortega      aspirin  81 mg Oral Daily Solange VecHENRIQUE norris      bumetanide  1 mg Oral Daily BRADLY Ortega      docusate sodium  100 mg Oral BID Damon Moser MD      hydrocortisone   Topical 4x Daily PRN Nevaeh Billingsley PA-C      ipratropium  2 spray Nasal Q12H Nevaeh Billingsley PA-C      metoprolol tartrate  50 mg Oral BID Aparna Myers MD      ondansetron  4 mg Intravenous Q6H PRN Nevaeh Billingsley PA-C      pneumococcal 23-valent polysaccharide vaccine  0 5 mL Subcutaneous Prior to discharge Aparna Myers MD      polyethylene glycol  17 g Oral Daily PRN Damon Moser MD      senna  2 tablet Oral HS Damon Moser MD      traMADol  50 mg Oral Q6H PRN Nevaeh Billingsley PA-C          Today, Patient Was Seen By: Damon Moser    ** Please Note: "This note has been constructed using a voice recognition system  Therefore there may be syntax, spelling, and/or grammatical errors   Please call if you have any questions  "**

## 2022-10-05 NOTE — PLAN OF CARE
Problem: MOBILITY - ADULT  Goal: Maintain or return to baseline ADL function  Description: INTERVENTIONS:  -  Assess patient's ability to carry out ADLs; assess patient's baseline for ADL function and identify physical deficits which impact ability to perform ADLs (bathing, care of mouth/teeth, toileting, grooming, dressing, etc )  - Assess/evaluate cause of self-care deficits   - Assess range of motion  - Assess patient's mobility; develop plan if impaired  - Assess patient's need for assistive devices and provide as appropriate  - Encourage maximum independence but intervene and supervise when necessary  - Involve family in performance of ADLs  - Assess for home care needs following discharge   - Consider OT consult to assist with ADL evaluation and planning for discharge  - Provide patient education as appropriate  Outcome: Progressing  Goal: Maintains/Returns to pre admission functional level  Description: INTERVENTIONS:  - Perform BMAT or MOVE assessment daily    - Set and communicate daily mobility goal to care team and patient/family/caregiver  - Collaborate with rehabilitation services on mobility goals if consulted  - Perform Range of Motion 3 times a day  - Reposition patient every 3 hours    - Dangle patient 3 times a day  - Stand patient 3 times a day  - Ambulate patient 3 times a day  - Out of bed to chair 3 times a day   - Out of bed for meals 3 times a day  - Out of bed for toileting  - Record patient progress and toleration of activity level   Outcome: Progressing     Problem: PAIN - ADULT  Goal: Verbalizes/displays adequate comfort level or baseline comfort level  Description: Interventions:  - Encourage patient to monitor pain and request assistance  - Assess pain using appropriate pain scale  - Administer analgesics based on type and severity of pain and evaluate response  - Implement non-pharmacological measures as appropriate and evaluate response  - Consider cultural and social influences on pain and pain management  - Notify physician/advanced practitioner if interventions unsuccessful or patient reports new pain  Outcome: Progressing     Problem: SAFETY ADULT  Goal: Maintain or return to baseline ADL function  Description: INTERVENTIONS:  -  Assess patient's ability to carry out ADLs; assess patient's baseline for ADL function and identify physical deficits which impact ability to perform ADLs (bathing, care of mouth/teeth, toileting, grooming, dressing, etc )  - Assess/evaluate cause of self-care deficits   - Assess range of motion  - Assess patient's mobility; develop plan if impaired  - Assess patient's need for assistive devices and provide as appropriate  - Encourage maximum independence but intervene and supervise when necessary  - Involve family in performance of ADLs  - Assess for home care needs following discharge   - Consider OT consult to assist with ADL evaluation and planning for discharge  - Provide patient education as appropriate  Outcome: Progressing  Goal: Patient will remain free of falls  Description: INTERVENTIONS:  - Educate patient/family on patient safety including physical limitations  - Instruct patient to call for assistance with activity   - Consult OT/PT to assist with strengthening/mobility   - Keep Call bell within reach  - Keep bed low and locked with side rails adjusted as appropriate  - Keep care items and personal belongings within reach  - Initiate and maintain comfort rounds  - Make Fall Risk Sign visible to staff  - Offer Toileting every 2 Hours, in advance of need  - Initiate/Maintain bed alarm  - Obtain necessary fall risk management equipment: alarm  - Apply yellow socks and bracelet for high fall risk patients  - Consider moving patient to room near nurses station  Outcome: Progressing     Problem: DISCHARGE PLANNING  Goal: Discharge to home or other facility with appropriate resources  Description: INTERVENTIONS:  - Identify barriers to discharge w/patient and caregiver  - Arrange for needed discharge resources and transportation as appropriate  - Identify discharge learning needs (meds, wound care, etc )  - Arrange for interpretive services to assist at discharge as needed  - Refer to Case Management Department for coordinating discharge planning if the patient needs post-hospital services based on physician/advanced practitioner order or complex needs related to functional status, cognitive ability, or social support system  Outcome: Progressing     Problem: CARDIOVASCULAR - ADULT  Goal: Maintains optimal cardiac output and hemodynamic stability  Description: INTERVENTIONS:  - Monitor I/O, vital signs and rhythm  - Monitor for S/S and trends of decreased cardiac output  - Administer and titrate ordered vasoactive medications to optimize hemodynamic stability  - Assess quality of pulses, skin color and temperature  - Assess for signs of decreased coronary artery perfusion  - Instruct patient to report change in severity of symptoms  Outcome: Progressing  Goal: Absence of cardiac dysrhythmias or at baseline rhythm  Description: INTERVENTIONS:  - Continuous cardiac monitoring, vital signs, obtain 12 lead EKG if ordered  - Administer antiarrhythmic and heart rate control medications as ordered  - Monitor electrolytes and administer replacement therapy as ordered  Outcome: Progressing     Problem: SKIN/TISSUE INTEGRITY - ADULT  Goal: Skin Integrity remains intact(Skin Breakdown Prevention)  Description: Assess:  -Perform Judah assessment every shift  -Clean and moisturize skin every day  -Inspect skin when repositioning, toileting, and assisting with ADLS  -Assess under medical devices such as  every   -Assess extremities for adequate circulation and sensation     Bed Management:  -Have minimal linens on bed & keep smooth, unwrinkled  -Change linens as needed when moist or perspiring  -Avoid sitting or lying in one position for more than 2 hours while in bed  -Keep HOB at [address]:  -Offer bedside commode  -Assess for incontinence every hour  -Use incontinent care products after each incontinent episode such as purewick/barrier cream    Activity:  -Mobilize patient 3 times a day  -Encourage activity and walks on unit  -Encourage or provide ROM exercises   -Turn and reposition patient every 2 Hours  -Use appropriate equipment to lift or move patient in bed  -Instruct/ Assist with weight shifting every 3 when out of bed in chair  -Consider limitation of chair time 2 hour intervals    Skin Care:  -Avoid use of baby powder, tape, friction and shearing, hot water or constrictive clothing  -Relieve pressure over bony prominences using allevyn  -Do not massage red bony areas    Next Steps:  -Teach patient strategies to minimize risks such as    -Consider consults to  interdisciplinary teams such as   Outcome: Progressing     Problem: MUSCULOSKELETAL - ADULT  Goal: Maintain or return mobility to safest level of function  Description: INTERVENTIONS:  - Assess patient's ability to carry out ADLs; assess patient's baseline for ADL function and identify physical deficits which impact ability to perform ADLs (bathing, care of mouth/teeth, toileting, grooming, dressing, etc )  - Assess/evaluate cause of self-care deficits   - Assess range of motion  - Assess patient's mobility  - Assess patient's need for assistive devices and provide as appropriate  - Encourage maximum independence but intervene and supervise when necessary  - Involve family in performance of ADLs  - Assess for home care needs following discharge   - Consider OT consult to assist with ADL evaluation and planning for discharge  - Provide patient education as appropriate  Outcome: Progressing     Problem: Potential for Falls  Goal: Patient will remain free of falls  Description: INTERVENTIONS:  - Educate patient/family on patient safety including physical limitations  - Instruct patient to call for assistance with activity   - Consult OT/PT to assist with strengthening/mobility   - Keep Call bell within reach  - Keep bed low and locked with side rails adjusted as appropriate  - Keep care items and personal belongings within reach  - Initiate and maintain comfort rounds  - Make Fall Risk Sign visible to staff  - Offer Toileting every 1 Hours, in advance of need  - Initiate/Maintain bedalarm  - Obtain necessary fall risk management equipment: alarm  - Apply yellow socks and bracelet for high fall risk patients  - Consider moving patient to room near nurses station  Outcome: Progressing     Problem: Prexisting or High Potential for Compromised Skin Integrity  Goal: Skin integrity is maintained or improved  Description: INTERVENTIONS:  - Identify patients at risk for skin breakdown  - Assess and monitor skin integrity  - Assess and monitor nutrition and hydration status  - Monitor labs   - Assess for incontinence   - Turn and reposition patient  - Assist with mobility/ambulation  - Relieve pressure over bony prominences  - Avoid friction and shearing  - Provide appropriate hygiene as needed including keeping skin clean and dry  - Evaluate need for skin moisturizer/barrier cream  - Collaborate with interdisciplinary team   - Patient/family teaching  - Consider wound care consult   Outcome: Progressing     Problem: SAFETY,RESTRAINT: NV/NON-SELF DESTRUCTIVE BEHAVIOR  Goal: Remains free of harm/injury (restraint for non violent/non self-detsructive behavior)  Description: INTERVENTIONS:  - Instruct patient/family regarding restraint use   - Assess and monitor physiologic and psychological status   - Provide interventions and comfort measures to meet assessed patient needs   - Identify and implement measures to help patient regain control  - Assess readiness for release of restraint   Outcome: Progressing  Goal: Returns to optimal restraint-free functioning  Description: INTERVENTIONS:  - Assess the patient's behavior and symptoms that indicate continued need for restraint  - Identify and implement measures to help patient regain control  - Assess readiness for release of restraint   Outcome: Progressing     Problem: Nutrition/Hydration-ADULT  Goal: Nutrient/Hydration intake appropriate for improving, restoring or maintaining nutritional needs  Description: Monitor and assess patient's nutrition/hydration status for malnutrition  Collaborate with interdisciplinary team and initiate plan and interventions as ordered  Monitor patient's weight and dietary intake as ordered or per policy  Utilize nutrition screening tool and intervene as necessary  Determine patient's food preferences and provide high-protein, high-caloric foods as appropriate       INTERVENTIONS:  - Monitor oral intake, urinary output, labs, and treatment plans  - Assess nutrition and hydration status and recommend course of action  - Evaluate amount of meals eaten  - Assist patient with eating if necessary   - Allow adequate time for meals  - Recommend/ encourage appropriate diets, oral nutritional supplements, and vitamin/mineral supplements  - Order, calculate, and assess calorie counts as needed  - Recommend, monitor, and adjust tube feedings and TPN/PPN based on assessed needs  - Assess need for intravenous fluids  - Provide specific nutrition/hydration education as appropriate  - Include patient/family/caregiver in decisions related to nutrition  Outcome: Progressing

## 2022-10-06 LAB
ANION GAP SERPL CALCULATED.3IONS-SCNC: 7 MMOL/L (ref 4–13)
BUN SERPL-MCNC: 36 MG/DL (ref 5–25)
CALCIUM SERPL-MCNC: 8.4 MG/DL (ref 8.3–10.1)
CHLORIDE SERPL-SCNC: 102 MMOL/L (ref 96–108)
CO2 SERPL-SCNC: 29 MMOL/L (ref 21–32)
CREAT SERPL-MCNC: 1.74 MG/DL (ref 0.6–1.3)
GFR SERPL CREATININE-BSD FRML MDRD: 27 ML/MIN/1.73SQ M
GLUCOSE SERPL-MCNC: 94 MG/DL (ref 65–140)
POTASSIUM SERPL-SCNC: 3.7 MMOL/L (ref 3.5–5.3)
SODIUM SERPL-SCNC: 138 MMOL/L (ref 135–147)

## 2022-10-06 PROCEDURE — 99232 SBSQ HOSP IP/OBS MODERATE 35: CPT | Performed by: INTERNAL MEDICINE

## 2022-10-06 PROCEDURE — 97530 THERAPEUTIC ACTIVITIES: CPT

## 2022-10-06 PROCEDURE — 80048 BASIC METABOLIC PNL TOTAL CA: CPT | Performed by: INTERNAL MEDICINE

## 2022-10-06 RX ORDER — BUMETANIDE 1 MG/1
1 TABLET ORAL DAILY
Status: DISCONTINUED | OUTPATIENT
Start: 2022-10-08 | End: 2022-10-07 | Stop reason: HOSPADM

## 2022-10-06 RX ADMIN — METOPROLOL TARTRATE 50 MG: 50 TABLET, FILM COATED ORAL at 09:22

## 2022-10-06 RX ADMIN — METOPROLOL TARTRATE 50 MG: 50 TABLET, FILM COATED ORAL at 22:35

## 2022-10-06 RX ADMIN — APIXABAN 5 MG: 5 TABLET, FILM COATED ORAL at 09:22

## 2022-10-06 RX ADMIN — DOCUSATE SODIUM 100 MG: 100 CAPSULE, LIQUID FILLED ORAL at 09:22

## 2022-10-06 RX ADMIN — APIXABAN 5 MG: 5 TABLET, FILM COATED ORAL at 18:14

## 2022-10-06 RX ADMIN — ASPIRIN 81 MG CHEWABLE TABLET 81 MG: 81 TABLET CHEWABLE at 09:22

## 2022-10-06 RX ADMIN — BUMETANIDE 1 MG: 1 TABLET ORAL at 09:22

## 2022-10-06 RX ADMIN — SENNOSIDES 17.2 MG: 8.6 TABLET, FILM COATED ORAL at 22:35

## 2022-10-06 RX ADMIN — ACETAMINOPHEN 650 MG: 325 TABLET ORAL at 07:11

## 2022-10-06 RX ADMIN — DOCUSATE SODIUM 100 MG: 100 CAPSULE, LIQUID FILLED ORAL at 18:14

## 2022-10-06 NOTE — PLAN OF CARE
Problem: MOBILITY - ADULT  Goal: Maintain or return to baseline ADL function  Description: INTERVENTIONS:  -  Assess patient's ability to carry out ADLs; assess patient's baseline for ADL function and identify physical deficits which impact ability to perform ADLs (bathing, care of mouth/teeth, toileting, grooming, dressing, etc )  - Assess/evaluate cause of self-care deficits   - Assess range of motion  - Assess patient's mobility; develop plan if impaired  - Assess patient's need for assistive devices and provide as appropriate  - Encourage maximum independence but intervene and supervise when necessary  - Involve family in performance of ADLs  - Assess for home care needs following discharge   - Consider OT consult to assist with ADL evaluation and planning for discharge  - Provide patient education as appropriate  Outcome: Progressing  Goal: Maintains/Returns to pre admission functional level  Description: INTERVENTIONS:  - Perform BMAT or MOVE assessment daily    - Set and communicate daily mobility goal to care team and patient/family/caregiver  - Collaborate with rehabilitation services on mobility goals if consulted  - Perform Range of Motion 2 times a day  - Reposition patient every 2 hours    - Dangle patient 2 times a day  - Stand patient 2 times a day  - Ambulate patient 2 times a day  - Out of bed to chair 2 times a day   - Out of bed for meals 2  Problem: PAIN - ADULT  Goal: Verbalizes/displays adequate comfort level or baseline comfort level  Description: Interventions:  - Encourage patient to monitor pain and request assistance  - Assess pain using appropriate pain scale  - Administer analgesics based on type and severity of pain and evaluate response  - Implement non-pharmacological measures as appropriate and evaluate response  - Consider cultural and social influences on pain and pain management  - Notify physician/advanced practitioner if interventions unsuccessful or patient reports new pain  Outcome: Progressing     Problem: SAFETY ADULT  Goal: Maintain or return to baseline ADL function  Description: INTERVENTIONS:  -  Assess patient's ability to carry out ADLs; assess patient's baseline for ADL function and identify physical deficits which impact ability to perform ADLs (bathing, care of mouth/teeth, toileting, grooming, dressing, etc )  - Assess/evaluate cause of self-care deficits   - Assess range of motion  - Assess patient's mobility; develop plan if impaired  - Assess patient's need for assistive devices and provide as appropriate  - Encourage maximum independence but intervene and supervise when necessary  - Involve family in performance of ADLs  - Assess for home care needs following discharge   - Consider OT consult to assist with ADL evaluation and planning for discharge  - Provide patient education as appropriate  Outcome: Progressing  Goal: Patient will remain free of falls  Description: INTERVENTIONS:  - Educate patient/family on patient safety including physical limitations  - Instruct patient to call for assistance with activity   - Consult OT/PT to assist with strengthening/mobility   - Keep Call bell within reach  - Keep bed low and locked with side rails adjusted as appropriate  - Keep care items and personal belongings within reach  - Initiate and maintain comfort rounds  - Make Fall Risk Sign visible to staff  - Offer Toileting every 2 Hours, in advance of need  - Initiate/Maintain bed alarm    Problem: DISCHARGE PLANNING  Goal: Discharge to home or other facility with appropriate resources  Description: INTERVENTIONS:  - Identify barriers to discharge w/patient and caregiver  - Arrange for needed discharge resources and transportation as appropriate  - Identify discharge learning needs (meds, wound care, etc )  - Arrange for interpretive services to assist at discharge as needed  - Refer to Case Management Department for coordinating discharge planning if the patient needs post-hospital services based on physician/advanced practitioner order or complex needs related to functional status, cognitive ability, or social support system  Outcome: Progressing     Problem: CARDIOVASCULAR - ADULT  Goal: Maintains optimal cardiac output and hemodynamic stability  Description: INTERVENTIONS:  - Monitor I/O, vital signs and rhythm  - Monitor for S/S and trends of decreased cardiac output  - Administer and titrate ordered vasoactive medications to optimize hemodynamic stability  - Assess quality of pulses, skin color and temperature  - Assess for signs of decreased coronary artery perfusion  - Instruct patient to report change in severity of symptoms  Outcome: Progressing  Goal: Absence of cardiac dysrhythmias or at baseline rhythm  Description: INTERVENTIONS:  - Continuous cardiac monitoring, vital signs, obtain 12 lead EKG if ordered  - Administer antiarrhythmic and heart rate control medications as ordered  - Monitor electrolytes and administer replacement therapy as ordered  Outcome: Progressing   - Apply yellow socks and bracelet for high fall risk patients  - Consider moving patient to room near nurses station  Outcome: Progressing    times a day  - Out of bed for toileting  - Record patient progress and toleration of activity level   Outcome: Progressing

## 2022-10-06 NOTE — PROGRESS NOTES
[facility] Progress Note  Patient: Mamadou Soler 68 y o  female   MRN: [id number]  PCP: Mago Salmon MD  Unit/Bed#: [address] [place] [address] Encounter: [id number]  Date Of Visit: 10/06/22    Problem List:    Principal Problem:    PAF (paroxysmal atrial fibrillation) ([place] [address] )  Active Problems:    Acute exacerbation of CHF (congestive heart failure) ([place] [address] )    Coronary artery disease involving native coronary artery of native heart without angina pectoris    CKD (chronic kidney disease) stage 4, GFR 15-29 ml/min ([facility])    Dementia with behavioral disturbance    Mitral valve regurgitation due to and not concurrent with acute myocardial infarction    Essential hypertension    Rash and nonspecific skin eruption    Arthritis    Infestation by bed bug      Assessment & Plan:    Acute exacerbation of CHF (congestive heart failure) ([facility])  Assessment & Plan  Wt Readings from Last 3 Encounters:   10/06/22 86 7 kg (191 lb 1 6 oz)   09/02/22 89 4 kg (197 lb)   03/11/22 86 2 kg (190 lb)     Acute CHF likely Diastolic in the setting of Hypertension, AFib evidenced by BNP 10,385, SOB and leg edema, treated with IV Bumex BID, daily weights, I&Os and fluid restriction  Was previously on torsemide 20mg but stopped secondary to drug reaction (not a true reaction), has been on ethacrynic acid for 5 days with no improvement,  Echo EF 50%,Left Atrium: The atrium is severely dilated (>48 mL/m2)  Right Atrium:   Mitral Valve: There is moderate regurgitation  Tricuspid/mitral Valve:  moderate regurgitation    · Continue metoprolol  · Not on Ace/ARB/Aldactone  · Continue Bumex 1 mg daily  · Monitor response  · Daily weight, intake output      * PAF (paroxysmal atrial fibrillation) ([facility])  Assessment & Plan  Status post Cardizem, converted to NSR  Cardiology following, input appreciated  · Continue metoprolol   · On Eliquis  · PT/OT    Mitral valve regurgitation due to and not concurrent with acute myocardial infarction  Assessment & Plan  Noted on echo,   Moderate MV-regurg  TV-regurg  Continue current cardiac meds  Cardiology input appreciated, Restarted low-dose Bumex, tolerating well  Follow-up labs    Dementia with behavioral disturbance  Assessment & Plan  Pt lives alone, daughter concerned she is unable to care for herself  · No longer on medication  · Supervised living, discharge planning to skilled nursing facility    CKD (chronic kidney disease) stage 4, GFR 15-29 ml/min ([facility])  Assessment & Plan  Creatinine at baseline, monitor with BMP  Monitor with diuresis  Avoid nephrotoxic    Coronary artery disease involving native coronary artery of native heart without angina pectoris  Assessment & Plan  Asymptomatic,  S/p PCI in 2012 with NARGIS to RCA, 2013 NARGIS to LAD    · Troponin trend negative  · Continue aspirin, metoprolol    Rash and nonspecific skin eruption  Assessment & Plan  Improving,  Patient with rash over body that has been presents for a few weeks  · Seen by dermatologist and told she has sulfa allergy, reaction to torsemide, rash actually 2/2 Bed bugs  · Hydrocortisone cream, benadryl prn    Essential hypertension  Assessment & Plan  Chronic, Continue metoprolol 50mg bid    Infestation by bed bug  Assessment & Plan  Decontaminated in ED  Cortisone daily p r n  Arthritis  Assessment & Plan  Right knee x-ray with evidence of arthritis   x-ray of bilateral hip with evidence of mild arthritis, with out any acute abnormality  PT/OT          VTE Pharmacologic Prophylaxis: VTE Score: 4 Moderate Risk (Score 3-4) - Pharmacological DVT Prophylaxis Ordered: apixaban (Eliquis)  Patient Centered Rounds: I performed bedside rounds with nursing staff today  Discussions with Specialists or Other Care Team Provider:  Yes    Education and Discussions with Family / Patient: Updated  (daughter) at bedside  Yesterday    Time Spent for Care: 45 minutes   More than 50% of total time spent on counseling and coordination of care as described above  Current Length of Stay: 7 day(s)  Current Patient Status: Inpatient   Certification Statement: The patient will continue to require additional inpatient hospital stay due to CHF  Discharge Plan: Anticipate discharge later today or tomorrow to rehab facility  Code Status: Level 1 - Full Code    Subjective:   No new complaints  Eating well as per nursing  Bowel movement today        Objective:     Vitals:   Temp (24hrs), Av 8 °F (36 6 °C), Min:97 6 °F (36 4 °C), Max:98 °F (36 7 °C)    Temp:  [97 6 °F (36 4 °C)-98 °F (36 7 °C)] 97 6 °F (36 4 °C)  HR:  [] 88  Resp:  [16-20] 20  BP: ()/(60-66) 99/63  SpO2:  [93 %-97 %] 97 % on room air  Body mass index is 29 93 kg/m²  Input and Output Summary (last 24 hours): Intake/Output Summary (Last 24 hours) at 10/6/2022 1735  Last data filed at 10/6/2022 0817  Gross per 24 hour   Intake 985 ml   Output 300 ml   Net 685 ml       Physical Exam:   Physical Exam  Constitutional:       General: She is not in acute distress  HENT:      Head: Normocephalic and atraumatic  Cardiovascular:      Rate and Rhythm: Normal rate  Pulmonary:      Effort: Pulmonary effort is normal  No respiratory distress  Breath sounds: Normal breath sounds  No wheezing or rales  Comments: Diminished, clear  Abdominal:      General: Bowel sounds are normal  There is no distension  Palpations: Abdomen is soft  Tenderness: There is no abdominal tenderness  There is no guarding or rebound  Musculoskeletal:      Right lower leg: No edema  Left lower leg: No edema  Skin:     General: Skin is warm and dry  Findings: No rash  Neurological:      Mental Status: She is alert  Mental status is at baseline           Additional Data:     Labs:  Results from last 7 days   Lab Units 10/04/22  0607 10/02/22  1229   WBC Thousand/uL 6 06 7 68   HEMOGLOBIN g/dL 12 5 12 4   HEMATOCRIT % 39 6 39 0   PLATELETS Thousands/uL 202 188   NEUTROS PCT % --  79*   LYMPHS PCT %  --  12*   MONOS PCT %  --  9   EOS PCT %  --  0     Results from last 7 days   Lab Units 10/06/22  0525 10/04/22  0607 10/03/22  0944   SODIUM mmol/L 138   < > 142   POTASSIUM mmol/L 3 7   < > 3 7   CHLORIDE mmol/L 102   < > 103   CO2 mmol/L 29   < > 30   BUN mg/dL 36*   < > 33*   CREATININE mg/dL 1 74*   < > 1 82*   ANION GAP mmol/L 7   < > 9   CALCIUM mg/dL 8 4   < > 8 7   ALBUMIN g/dL  --   --  2 8*   TOTAL BILIRUBIN mg/dL  --   --  0 89   ALK PHOS U/L  --   --  68   ALT U/L  --   --  35   AST U/L  --   --  87*   GLUCOSE RANDOM mg/dL 94   < > 90    < > = values in this interval not displayed           Results from last 7 days   Lab Units 10/02/22  0939   POC GLUCOSE mg/dl 267*               Lines/Drains:  Invasive Devices  Report    Peripheral Intravenous Line  Duration           Peripheral IV 10/04/22 Left;Ventral (anterior) Forearm 1 day          Drain  Duration           External Urinary Catheter 2 days                  Telemetry:  Telemetry Orders (From admission, onward)             48 Hour Telemetry Monitoring  Continuous x 48 hours        References:    Telemetry Guidelines   Question:  Reason for 48 Hour Telemetry  Answer:  Arrhythmias Requiring Medical Therapy (eg  SVT, Vtach/fib, Bradycardia, Uncontrolled A-fib)                 Telemetry Reviewed: Normal Sinus Rhythm  Indication for Continued Telemetry Use: Arrthymias requiring medical therapy             Imaging: Reviewed radiology reports from this admission including: chest xray    Recent Cultures (last 7 days):         Last 24 Hours Medication List:   Current Facility-Administered Medications   Medication Dose Route Frequency Provider Last Rate    acetaminophen  650 mg Oral Q6H PRN Sharmin Godinez PA-C      apixaban  5 mg Oral BID BRADLY Becerril      aspirin  81 mg Oral Daily Solange Martinez PA-C      bumetanide  1 mg Oral Daily BRADLY Becerril      docusate sodium  100 mg Oral BID MD Remberto Palm hydrocortisone   Topical 4x Daily PRN Christina Fermin PA-C      ipratropium  2 spray Nasal Q12H Christina Fermin PA-C      metoprolol tartrate  50 mg Oral BID Geovani Giraldo MD      ondansetron  4 mg Intravenous Q6H PRN Christina Fermin PA-C      pneumococcal 23-valent polysaccharide vaccine  0 5 mL Subcutaneous Prior to discharge Geovani Giraldo MD      polyethylene glycol  17 g Oral Daily PRN Lauren Lane MD      senna  2 tablet Oral HS Lauren Lane MD      traMADol  50 mg Oral Q6H PRN Christina Fermin PA-C          Today, Patient Was Seen By: Lauren Lane    ** Please Note: "This note has been constructed using a voice recognition system  Therefore there may be syntax, spelling, and/or grammatical errors   Please call if you have any questions  "**

## 2022-10-06 NOTE — PROGRESS NOTES
10/05/22 4150   Discharge Communications   Discharge planning discussed with: Donny Carbajal   Other Referral/Resources/Interventions Provided:   Referral Comments S/w patients daughter Donny Carbajal to provide update regarding bed search and cancelled d/c  CM made her aware that [facility] was reviewing but also did not have beds available today  Bam scruggs

## 2022-10-06 NOTE — DISCHARGE SUMMARY
Discharge Summary - [facility]    Patient Information: [facility] 68 y o  female MRN: [id number]  Unit/Bed#: 3 [address] Encounter: [id number]    Discharging Physician / Practitioner: Sylvia Merrill  PCP: Danielito Evans MD  Admission Date: 9/28/2022  Discharge Date: 10/07/22    Reason for Admission: Leg Swelling (Pt arrived EMS from home  Per pt daughter states pt recently had diuretics changed and pt still has bilateral leg swelling with leg weakness  Pt c/o right inner thigh pain with SOB  Hx of CHF, COPD, and dementia  Pt Spo2 97% on room air  )      Discharge Diagnoses:     Principal Problem:    PAF (paroxysmal atrial fibrillation) ([facility])  Active Problems:    Acute exacerbation of CHF (congestive heart failure) ([facility])    Coronary artery disease involving native coronary artery of native heart without angina pectoris    CKD (chronic kidney disease) stage 4, GFR 15-29 ml/min ([facility])    Dementia with behavioral disturbance    Mitral valve regurgitation due to and not concurrent with acute myocardial infarction    Essential hypertension    Rash and nonspecific skin eruption    Arthritis    Infestation by bed bug  Resolved Problems:    * No resolved hospital problems  *        Acute exacerbation of CHF (congestive heart failure) ([facility])  Assessment & Plan  Wt Readings from Last 3 Encounters:   10/07/22 85 4 kg (188 lb 4 8 oz)   09/02/22 89 4 kg (197 lb)   03/11/22 86 2 kg (190 lb)     Acute CHF likely Diastolic in the setting of Hypertension, AFib evidenced by BNP 10,385, SOB and leg edema, treated with IV Bumex BID, daily weights, I&Os and fluid restriction  Was previously on torsemide 20mg but stopped secondary to drug reaction (?not a true reaction), has been on ethacrynic acid for 5 days with no improvement,  Echo EF 50%,Left Atrium: The atrium is severely dilated (>48 mL/m2)  Right Atrium:   Mitral Valve: There is moderate regurgitation  Tricuspid/mitral Valve:  moderate regurgitation  Weight and volume status improved  Currently euvolemic  · Continue metoprolol  · Not on Ace/ARB/Aldactone secondary to CKD  · Continue Bumex 1 mg daily  · Monitor BMP  · Daily weight, intake output      * PAF (paroxysmal atrial fibrillation) ([facility])  Assessment & Plan  Status post Cardizem, converted to NSR  Cardiology following, input appreciated  · Continue metoprolol   · On Eliquis  · PT/OT    Mitral valve regurgitation due to and not concurrent with acute myocardial infarction  Assessment & Plan  Noted on echo,   Moderate MV-regurg  TV-regurg  Continue current cardiac meds  Cardiology input appreciated, Restarted low-dose Bumex, tolerating well  Follow-up labs    Dementia with behavioral disturbance  Assessment & Plan  Pt lives alone, daughter concerned she is unable to care for herself  · No longer on medication  · Supervised living, discharge planning to skilled nursing facility    CKD (chronic kidney disease) stage 4, GFR 15-29 ml/min ([facility])  Assessment & Plan  Creatinine at baseline, monitor with BMP  Monitor with diuresis  Avoid nephrotoxic    Coronary artery disease involving native coronary artery of native heart without angina pectoris  Assessment & Plan  Asymptomatic,  S/p PCI in 2012 with NARGIS to RCA, 2013 NARGIS to LAD    · Troponin trend negative  · Continue aspirin, metoprolol    Rash and nonspecific skin eruption  Assessment & Plan  Improving,  Patient with rash over body that has been presents for a few weeks  · Seen by dermatologist and told she has sulfa allergy, reaction to torsemide, ? Related to 2/2 Bed bugs  · Hydrocortisone cream      Essential hypertension  Assessment & Plan  Continue metoprolol 50mg bid    Infestation by bed bug  Assessment & Plan  Decontaminated in ED    Arthritis  Assessment & Plan  Right knee x-ray with evidence of arthritis   x-ray of bilateral hip with evidence of mild arthritis, with out any acute abnormality    PT/OT      Consultations During [facility]:  Yasmine Roche TO CASE MANAGEMENT  IP CONSULT TO CARDIOLOGY    Procedures Performed:     · Echocardiogram    Significant Findings:     · Refer to hospital course and above listed diagnosis related plan for details    Imaging while in hospital:    XR chest 1 view portable    Result Date: 9/29/2022  Narrative: CHEST INDICATION:   edema/hx of CHF  COMPARISON:  March 11, 2022  EXAM PERFORMED/VIEWS:  XR CHEST PORTABLE FINDINGS: Borderline enlargement of the cardiac silhouette  Mediastinal contours are within normal limits  Left costophrenic sulcus is not included in the field-of-view  No focal consolidation, pleural effusion or pneumothorax  Osseous structures appear within normal limits for patient age  Impression: No active pulmonary disease  Workstation performed: [id number]     XR knee 4+ vw right injury    Result Date: 9/29/2022  Narrative: RIGHT KNEE INDICATION:   trauma  COMPARISON:  None VIEWS:  XR KNEE 4+ VW RIGHT INJURY FINDINGS: There is no acute fracture or dislocation  There is no joint effusion  Mild osteoarthritis of medial and patellofemoral compartments with small marginal osteophytes  No lytic or blastic osseous lesion  Atherosclerotic calcifications  Mild prepatellar soft tissue swelling  Impression: No acute osseous abnormality  Mild osteoarthritis  Mild prepatellar soft tissue swelling  Workstation performed: [id number]     VAS lower limb venous duplex study, complete bilateral    Result Date: 9/29/2022  Narrative:  [facility] REPORT CLINICAL: Indications: Patient presents with bilateral lower extremity edema  Operative History: Cardiac stent x1 Risk Factors The patient has history of HTN and CAD  CONCLUSION:  Impression: RIGHT LOWER LIMB: No evidence of acute or chronic deep vein thrombosis  No evidence of superficial thrombophlebitis noted  Doppler evaluation shows a normal response to augmentation maneuvers   Popliteal, posterior tibial and anterior tibial arterial Doppler waveforms are biphasic  LEFT LOWER LIMB: No evidence of acute or chronic deep vein thrombosis  No evidence of superficial thrombophlebitis noted  Doppler evaluation shows a normal response to augmentation maneuvers  Popliteal, posterior tibial and anterior tibial arterial Doppler waveforms are biphasic  Technically difficult/limited study secondary to sensitivity to probe pressure  Technical findings were given to Dr Smith Going  SIGNATURE: Electronically Signed by: Fredy Watts on 2022-09-29 02:23:59 PM    XR hips bilateral 3-4 vw w pelvis if performed    Result Date: 10/5/2022  Narrative: BILATERAL HIPS AND PELVIS INDICATION:   Bilateral hip pain  COMPARISON:  None VIEWS:  XR HIPS BILATERAL 3-4 VW W PELVIS IF PERFORMED Images: 6  FINDINGS: The bony pelvis appears intact  Degenerative changes visualized lower lumbar spine  LEFT HIP: There is no acute fracture or dislocation  Mild left hip osteoarthritis is seen  No lytic or blastic osseous lesion  Soft tissues are unremarkable  RIGHT HIP: There is no acute fracture or dislocation  Mild right hip osteoarthritis is seen  No lytic or blastic osseous lesion  Soft tissues are unremarkable  Impression: Mild degenerative arthritis both hips No acute osseous abnormality  Workstation performed: [id number]     Echo follow up/limited w/ contrast if indicated    Result Date: 9/30/2022  Narrative: Lima Courser  Left Ventricle: Left ventricular cavity size is normal  Wall thickness is mildly increased  The left ventricular ejection fraction is 50% by visual estimation  Systolic function is low normal  Although no diagnostic regional wall motion abnormality was identified, this possibility cannot be completely excluded on the basis of this study    Left Atrium: The atrium is severely dilated (>48 mL/m2)    Right Atrium: The atrium is moderately dilated    Mitral Valve: There is moderate regurgitation    Tricuspid Valve: There is moderate regurgitation   The right ventricular systolic pressure is normal  The estimated right ventricular systolic pressure is 16 98 mmHg    Compared to previous exam, there has been no significant change  Incidental Findings:   · Imaging as above    Test Results Pending at Discharge (will require follow up):   · As per After Visit Summary     Outpatient Tests Requested:  · CBC, BMP    Complications:  Refer to hospital course and above listed diagnosis related plan, if any    Hospital Course: As per HPI  Maria Del Rosario Kiser is a 68 y o  female patient with past medical history of hypertension, dyslipidemia, CKD, dementia who originally presented to the hospital on 9/28/2022 due to shortness of breath  Patient is poor historian secondary to dementia, history obtained from daughter  Patient has been complaining of shortness of breath with exertion, swelling and pain of her legs  Also with itching rash present over body for the past few weeks  Patient was on torsemide 20mg and seen by dermatologist who thought rash was secondary to sulfa allergy and starting torsemide  This was stopped and patient has been on ethacrynic acid for 5 days with no improvement of symptoms  Pt lives home alone and has not been taking care of herself, mostly just lying in bed unless home aid is around  Pt found to have bed bugs in ED  Daughter lives in [place] and is not able to see daughter as often as she would like, is asking about rehab options  Denied any fevers, chills, chest pain, cough, abdominal pain, constipation/diarrhea, dysuria, hematuria  Patient was admitted to the hospital   Patient was started on Bumex for CHF with good response  Patient was seen by Cardiology, echocardiogram was done as above  Patient was noted to episode of SVT followed by development of AFib  Patient briefly required IV Cardizem drip  Metoprolol was increased  Patient was also started on anticoagulation with Eliquis for paroxysmal AFib  Volume status gradually improved with diuresis  Heart rate remained controlled  Patient spontaneously converted to sinus rhythm  Patient was continued on Bumex due to concerns of allergic reaction to torsemide  Patient did not have any worsening of rash while on Bumex with good urine output  Patient was admitted by Physical therapy and rehab was recommended  Patient is currently euvolemic with stable vital sign  Tolerating current medication  Patient will be discharged to rehab will follow-up with Cardiology after discharge  Patient will require monitoring of volume status, CBC and BMP after discharge  Please see above list of diagnoses and related plan for additional information  Condition at Discharge: fair     Discharge Day Visit / Exam:     Subjective:  Comfortably is lying in bed  Denies any pain  Denies any chest pain shortness of breath  Comfortable   Remains confused but cooperative    Vitals: Blood Pressure: 121/66 (10/06/22 0817)  Pulse: 80 (10/06/22 0817)  Temperature: 97 7 °F (36 5 °C) (10/06/22 0817)  Temp Source: Oral (10/06/22 0817)  Respirations: 18 (10/06/22 0817)  Height: 5' 7" (170 2 cm) (09/30/22 0125)  Weight - Scale: 86 7 kg (191 lb 1 6 oz) (10/06/22 0540)  SpO2: 95 % (10/06/22 0817)  Exam:   Physical Exam  Constitutional:       General: She is not in acute distress  HENT:      Head: Normocephalic and atraumatic  Cardiovascular:      Rate and Rhythm: Normal rate  Heart sounds: Murmur heard  Pulmonary:      Effort: Pulmonary effort is normal  No respiratory distress  Breath sounds: Normal breath sounds  No wheezing or rales  Comments: Diminished bilaterally, clear  Abdominal:      General: Bowel sounds are normal  There is no distension  Palpations: Abdomen is soft  Tenderness: There is no abdominal tenderness  There is no guarding or rebound  Musculoskeletal:         General: Deformity (Diffuse arthritic changes) present  Right lower leg: No edema  Left lower leg: No edema  Skin:     General: Skin is warm and dry  Findings: No rash  Neurological:      Mental Status: She is alert  Psychiatric:         Cognition and Memory: Cognition is impaired  Discharge instructions/Information to patient and family:(Discharge Medications and Follow up):   See after visit summary for information provided to patient and family  Provisions for Follow-Up Care:  See after visit summary for information related to follow-up care and any pertinent home health orders  Disposition: Short-term rehab at [address] [address] Readmission:  No     Discharge Statement:  I spent 45 minutes discharging the patient  This time was spent on the day of discharge  I had direct contact with the patient on the day of discharge  Greater than 50% of the total time was spent examining patient, answering all patient questions, arranging and discussing plan of care with patient as well as directly providing post-discharge instructions  Additional time then spent on discharge activities  Discussed with daughter over the phone    Discharge Medications:  See after visit summary for reconciled discharge medications provided to patient and family  ** Please Note: "This note has been constructed using a voice recognition system  Therefore there may be syntax, spelling, and/or grammatical errors   Please call if you have any questions  "**

## 2022-10-06 NOTE — PHYSICAL THERAPY NOTE
PT TREATMENT     10/06/22 1150   PT Last Visit   PT Visit Date 10/06/22   Note Type   Note Type Treatment   Pain Assessment   Pain Assessment Tool Panda-Baker FACES   Panda-Baker FACES Pain Rating 0   Restrictions/Precautions   Weight Bearing Precautions Per Order No   Other Precautions Cognitive; Chair Alarm; Bed Alarm; Fall Risk   General   Chart Reviewed Yes   Family/Caregiver Present Yes  (best friend : Lakepily Blakeson)   Cognition   Overall Cognitive Status Impaired   Arousal/Participation Cooperative   Attention Difficulty attending to directions   Orientation Level Oriented to person   Following Commands Follows one step commands with increased time or repetition   Subjective   Subjective Pt wants to go to the bathroom   Bed Mobility   Additional Comments Pt presents in short sit with RN: going to the commode   Transfers   Sit to Stand 3  Moderate assistance   Additional items Assist x 2   Stand to Sit 3  Moderate assistance   Additional items Assist x 2;Verbal cues; Increased time required  (assist of 2 due to mentation: needs constant verbal and visual cues to get from bed > chair)   Toilet transfer 3  Moderate assistance   Additional items Assist x 1;Verbal cues; Commode   Additional Comments when pt was finished on commode (did not go due to pt just urinated in the bed prior to going to the commode )   Ambulation/Elevation   Gait pattern Wide HILARIO;Step to;Excessively slow   Gait Assistance 3  Moderate assist   Additional items Assist x 1;Verbal cues   Assistive Device None   Distance 3 feet x 2   Activity Tolerance   Activity Tolerance Other (Comment)  (limited by mentation)   Nurse Made Aware yes: Katie Bloodgood: pt in chair with seat alarm and lunch tray in front; Amanuel Poe still present   Assessment   Prognosis Good   Problem List Decreased strength;Decreased endurance; Impaired balance;Decreased mobility; Decreased cognition; Impaired judgement;Decreased safety awareness; Obesity   Assessment Pt is limited due to decreased mentation  Pt is very easily distracted and has difficulty focusing on task at hand  Pt transferred from commode to sitting at EOB as chair was prepared (friend as stand by to keep pt on the bed as PT prepared chair)  Pt transferred with Min/Mod A and increased verbal cues from bed > chair  Positioned with tray table in front and seat alarm activated  Pt's friend assisting with focusing on lunch tray at end of session  A: Tolerated fair  Pt is limited by safety awareness / mentation  P: Cont  as per plan  Ultimate care after rehab would be a memory unit  Pt is not safe to live alone  The patient's AM-PAC Basic Mobility Inpatient Short Form Raw Score is 11  A Raw score of less than or equal to 16 suggests the patient may benefit from discharge to post-acute rehabilitation services  Please also refer to the recommendation of the Physical Therapist for safe discharge planning  Goals   Patient Goals unable to state due to mentation  Plan   Treatment/Interventions ADL retraining;Functional transfer training;LE strengthening/ROM; Therapeutic exercise; Endurance training;Cognitive reorientation;Patient/family training;Equipment eval/education; Bed mobility;Gait training;Spoke to nursing;Spoke to case management   Progress Progressing toward goals  (progressing toward functional goals)   PT Frequency Other (Comment)  (5/wk)   Recommendation   PT Discharge Recommendation Post acute rehabilitation services   AM-PAC Basic Mobility Inpatient   Turning in Bed Without Bedrails 2   Lying on Back to Sitting on Edge of Flat Bed 2   Moving Bed to Chair 2   Standing Up From Chair 2   Walk in Room 2   Climb 3-5 Stairs 1   Basic Mobility Inpatient Raw Score 11   Basic Mobility Standardized Score 30 25   Turning Head Towards Sound 4   Follow Simple Instructions 2   Low Function Basic Mobility Raw Score 17   Low Function Basic Mobility Standardized Score 27 46   Highest Level Of Mobility   -[facility] Goal 4: Move to chair/commode GUSTAVO Achieved 4: Move to chair/commode   End of Consult   Patient Position at End of Consult Bedside chair;Bed/Chair alarm activated; All needs within reach   End of Consult Comments RN made aware; friend with pt  Licensure   [place] License Number  Moy Morrisonwright PT  [id number]

## 2022-10-06 NOTE — NURSING NOTE
Nurse (authored) offered to [facility] [place]  (Visitor had communicated that patient needed to go to the bathroom ) Patient refused at this time   Communication provided to Chris Harrington RN (nurse in charge of patient's care )

## 2022-10-06 NOTE — ASSESSMENT & PLAN NOTE
Right knee x-ray with evidence of arthritis   x-ray of bilateral hip with evidence of mild arthritis, with out any acute abnormality    PT/OT

## 2022-10-06 NOTE — PLAN OF CARE
Problem: MOBILITY - ADULT  Goal: Maintain or return to baseline ADL function  Description: INTERVENTIONS:  -  Assess patient's ability to carry out ADLs; assess patient's baseline for ADL function and identify physical deficits which impact ability to perform ADLs (bathing, care of mouth/teeth, toileting, grooming, dressing, etc )  - Assess/evaluate cause of self-care deficits   - Assess range of motion  - Assess patient's mobility; develop plan if impaired  - Assess patient's need for assistive devices and provide as appropriate  - Encourage maximum independence but intervene and supervise when necessary  - Involve family in performance of ADLs  - Assess for home care needs following discharge   - Consider OT consult to assist with ADL evaluation and planning for discharge  - Provide patient education as appropriate  Outcome: Progressing  Goal: Maintains/Returns to pre admission functional level  Description: INTERVENTIONS:  - Perform BMAT or MOVE assessment daily    - Set and communicate daily mobility goal to care team and patient/family/caregiver  - Collaborate with rehabilitation services on mobility goals if consulted  - Perform Range of Motion 5 times a day  - Reposition patient every 2 hours    - Dangle patient 3 times a day  - Stand patient 3 times a day  - Ambulate patient 3 times a day  - Out of bed to chair 3 times a day   - Out of bed for meals 3 times a day  - Out of bed for toileting  - Record patient progress and toleration of activity level   Outcome: Progressing     Problem: PAIN - ADULT  Goal: Verbalizes/displays adequate comfort level or baseline comfort level  Description: Interventions:  - Encourage patient to monitor pain and request assistance  - Assess pain using appropriate pain scale  - Administer analgesics based on type and severity of pain and evaluate response  - Implement non-pharmacological measures as appropriate and evaluate response  - Consider cultural and social influences on pain and pain management  - Notify physician/advanced practitioner if interventions unsuccessful or patient reports new pain  Outcome: Progressing     Problem: SAFETY ADULT  Goal: Maintain or return to baseline ADL function  Description: INTERVENTIONS:  -  Assess patient's ability to carry out ADLs; assess patient's baseline for ADL function and identify physical deficits which impact ability to perform ADLs (bathing, care of mouth/teeth, toileting, grooming, dressing, etc )  - Assess/evaluate cause of self-care deficits   - Assess range of motion  - Assess patient's mobility; develop plan if impaired  - Assess patient's need for assistive devices and provide as appropriate  - Encourage maximum independence but intervene and supervise when necessary  - Involve family in performance of ADLs  - Assess for home care needs following discharge   - Consider OT consult to assist with ADL evaluation and planning for discharge  - Provide patient education as appropriate  Outcome: Progressing  Goal: Patient will remain free of falls  Description: INTERVENTIONS:  - Educate patient/family on patient safety including physical limitations  - Instruct patient to call for assistance with activity   - Consult OT/PT to assist with strengthening/mobility   - Keep Call bell within reach  - Keep bed low and locked with side rails adjusted as appropriate  - Keep care items and personal belongings within reach  - Initiate and maintain comfort rounds  - Make Fall Risk Sign visible to staff  - Offer Toileting every 2 Hours, in advance of need  - Initiate/Maintain bed alarm  - Obtain necessary fall risk management equipment: alarms, bedside commode  - Apply yellow socks and bracelet for high fall risk patients  - Consider moving patient to room near nurses station  Outcome: Progressing     Problem: DISCHARGE PLANNING  Goal: Discharge to home or other facility with appropriate resources  Description: INTERVENTIONS:  - Identify barriers to discharge w/patient and caregiver  - Arrange for needed discharge resources and transportation as appropriate  - Identify discharge learning needs (meds, wound care, etc )  - Arrange for interpretive services to assist at discharge as needed  - Refer to Case Management Department for coordinating discharge planning if the patient needs post-hospital services based on physician/advanced practitioner order or complex needs related to functional status, cognitive ability, or social support system  Outcome: Progressing     Problem: CARDIOVASCULAR - ADULT  Goal: Maintains optimal cardiac output and hemodynamic stability  Description: INTERVENTIONS:  - Monitor I/O, vital signs and rhythm  - Monitor for S/S and trends of decreased cardiac output  - Administer and titrate ordered vasoactive medications to optimize hemodynamic stability  - Assess quality of pulses, skin color and temperature  - Assess for signs of decreased coronary artery perfusion  - Instruct patient to report change in severity of symptoms  Outcome: Progressing  Goal: Absence of cardiac dysrhythmias or at baseline rhythm  Description: INTERVENTIONS:  - Continuous cardiac monitoring, vital signs, obtain 12 lead EKG if ordered  - Administer antiarrhythmic and heart rate control medications as ordered  - Monitor electrolytes and administer replacement therapy as ordered  Outcome: Progressing     Problem: SKIN/TISSUE INTEGRITY - ADULT  Goal: Skin Integrity remains intact(Skin Breakdown Prevention)  Description: Assess:  -Perform Judah assessment every shift, prn  -Clean and moisturize skin every shift, prn  -Inspect skin when repositioning, toileting, and assisting with ADLS  -Assess under medical devices such as masimo, restraints every q2  -Assess extremities for adequate circulation and sensation     Bed Management:  -Have minimal linens on bed & keep smooth, unwrinkled  -Change linens as needed when moist or perspiring  -Avoid sitting or lying in one position for more than 2 hours while in bed  -Keep HOB at 30 degrees     Toileting:  -Offer bedside commode  -Assess for incontinence every q2  -Use incontinent care products after each incontinent episode such as moisture barrier      Activity:  -Mobilize patient 5 times a day  -Encourage activity and walks on unit  -Encourage or provide ROM exercises   -Turn and reposition patient every 2 Hours  -Use appropriate equipment to lift or move patient in bed  -Instruct/ Assist with weight shifting every 2h when out of bed in chair  -Consider limitation of chair time 2 hour intervals    Skin Care:  -Avoid use of baby powder, tape, friction and shearing, hot water or constrictive clothing  -Relieve pressure over bony prominences using allevyn, waffle  -Do not massage red bony areas    Next Steps:  -Teach patient strategies to minimize risks such as reposition   -Consider consults to  interdisciplinary teams such as ptot  Outcome: Progressing     Problem: MUSCULOSKELETAL - ADULT  Goal: Maintain or return mobility to safest level of function  Description: INTERVENTIONS:  - Assess patient's ability to carry out ADLs; assess patient's baseline for ADL function and identify physical deficits which impact ability to perform ADLs (bathing, care of mouth/teeth, toileting, grooming, dressing, etc )  - Assess/evaluate cause of self-care deficits   - Assess range of motion  - Assess patient's mobility  - Assess patient's need for assistive devices and provide as appropriate  - Encourage maximum independence but intervene and supervise when necessary  - Involve family in performance of ADLs  - Assess for home care needs following discharge   - Consider OT consult to assist with ADL evaluation and planning for discharge  - Provide patient education as appropriate  Outcome: Progressing     Problem: Potential for Falls  Goal: Patient will remain free of falls  Description: INTERVENTIONS:  - Educate patient/family on patient safety including physical limitations  - Instruct patient to call for assistance with activity   - Consult OT/PT to assist with strengthening/mobility   - Keep Call bell within reach  - Keep bed low and locked with side rails adjusted as appropriate  - Keep care items and personal belongings within reach  - Initiate and maintain comfort rounds  - Make Fall Risk Sign visible to staff  - Offer Toileting every 2 Hours, in advance of need  - Initiate/Maintain bed alarm  - Obtain necessary fall risk management equipment: alarm  - Apply yellow socks and bracelet for high fall risk patients  - Consider moving patient to room near nurses station  Outcome: Progressing     Problem: Prexisting or High Potential for Compromised Skin Integrity  Goal: Skin integrity is maintained or improved  Description: INTERVENTIONS:  - Identify patients at risk for skin breakdown  - Assess and monitor skin integrity  - Assess and monitor nutrition and hydration status  - Monitor labs   - Assess for incontinence   - Turn and reposition patient  - Assist with mobility/ambulation  - Relieve pressure over bony prominences  - Avoid friction and shearing  - Provide appropriate hygiene as needed including keeping skin clean and dry  - Evaluate need for skin moisturizer/barrier cream  - Collaborate with interdisciplinary team   - Patient/family teaching  - Consider wound care consult   Outcome: Progressing     Problem: SAFETY,RESTRAINT: NV/NON-SELF DESTRUCTIVE BEHAVIOR  Goal: Remains free of harm/injury (restraint for non violent/non self-detsructive behavior)  Description: INTERVENTIONS:  - Instruct patient/family regarding restraint use   - Assess and monitor physiologic and psychological status   - Provide interventions and comfort measures to meet assessed patient needs   - Identify and implement measures to help patient regain control  - Assess readiness for release of restraint   Outcome: Progressing  Goal: Returns to optimal restraint-free functioning  Description: INTERVENTIONS:  - Assess the patient's behavior and symptoms that indicate continued need for restraint  - Identify and implement measures to help patient regain control  - Assess readiness for release of restraint   Outcome: Progressing     Problem: Nutrition/Hydration-ADULT  Goal: Nutrient/Hydration intake appropriate for improving, restoring or maintaining nutritional needs  Description: Monitor and assess patient's nutrition/hydration status for malnutrition  Collaborate with interdisciplinary team and initiate plan and interventions as ordered  Monitor patient's weight and dietary intake as ordered or per policy  Utilize nutrition screening tool and intervene as necessary  Determine patient's food preferences and provide high-protein, high-caloric foods as appropriate       INTERVENTIONS:  - Monitor oral intake, urinary output, labs, and treatment plans  - Assess nutrition and hydration status and recommend course of action  - Evaluate amount of meals eaten  - Assist patient with eating if necessary   - Allow adequate time for meals  - Recommend/ encourage appropriate diets, oral nutritional supplements, and vitamin/mineral supplements  - Order, calculate, and assess calorie counts as needed  - Recommend, monitor, and adjust tube feedings and TPN/PPN based on assessed needs  - Assess need for intravenous fluids  - Provide specific nutrition/hydration education as appropriate  - Include patient/family/caregiver in decisions related to nutrition  Outcome: Progressing

## 2022-10-06 NOTE — CASE MANAGEMENT
Case Management Progress Note    Patient name Angelina Mckeon  Location [address] [facility]-* MRN [id number]  : 1945 Date 10/6/2022       LOS (days): 7  Geometric Mean LOS (GMLOS) (days): 3 90  Days to GMLOS:-2 7        OBJECTIVE:        Current admission status: Inpatient  Preferred Pharmacy:   [facility] [address]  [address] [place] [address] Drive  Phone: [phone] Fax: [phone]    Primary Care Provider: Era Garcia MD    Primary Insurance: MEDICARE  Secondary Insurance: COMMERCIAL MISCELLANEOUS    PROGRESS NOTE:    CM followed up with [facility] referrals, still no bed offers for today  Per Delfino Mckeon at [address], they will have a bed for the patient at [address] tomorrow  CM attempted to contact daughter Chris Vela to provide an update  Left message requesting call back

## 2022-10-07 VITALS
HEART RATE: 79 BPM | HEIGHT: 67 IN | RESPIRATION RATE: 16 BRPM | TEMPERATURE: 97.9 F | BODY MASS INDEX: 29.55 KG/M2 | DIASTOLIC BLOOD PRESSURE: 81 MMHG | SYSTOLIC BLOOD PRESSURE: 127 MMHG | WEIGHT: 188.3 LBS | OXYGEN SATURATION: 94 %

## 2022-10-07 LAB
ANION GAP SERPL CALCULATED.3IONS-SCNC: 9 MMOL/L (ref 4–13)
BUN SERPL-MCNC: 40 MG/DL (ref 5–25)
CALCIUM SERPL-MCNC: 8.7 MG/DL (ref 8.3–10.1)
CHLORIDE SERPL-SCNC: 103 MMOL/L (ref 96–108)
CO2 SERPL-SCNC: 26 MMOL/L (ref 21–32)
CREAT SERPL-MCNC: 1.77 MG/DL (ref 0.6–1.3)
GFR SERPL CREATININE-BSD FRML MDRD: 27 ML/MIN/1.73SQ M
GLUCOSE SERPL-MCNC: 97 MG/DL (ref 65–140)
POTASSIUM SERPL-SCNC: 3.9 MMOL/L (ref 3.5–5.3)
SODIUM SERPL-SCNC: 138 MMOL/L (ref 135–147)

## 2022-10-07 PROCEDURE — 99239 HOSP IP/OBS DSCHRG MGMT >30: CPT | Performed by: INTERNAL MEDICINE

## 2022-10-07 PROCEDURE — 80048 BASIC METABOLIC PNL TOTAL CA: CPT | Performed by: INTERNAL MEDICINE

## 2022-10-07 PROCEDURE — 90732 PPSV23 VACC 2 YRS+ SUBQ/IM: CPT | Performed by: STUDENT IN AN ORGANIZED HEALTH CARE EDUCATION/TRAINING PROGRAM

## 2022-10-07 PROCEDURE — G0009 ADMIN PNEUMOCOCCAL VACCINE: HCPCS | Performed by: STUDENT IN AN ORGANIZED HEALTH CARE EDUCATION/TRAINING PROGRAM

## 2022-10-07 RX ORDER — DOCUSATE SODIUM 100 MG/1
100 CAPSULE, LIQUID FILLED ORAL 2 TIMES DAILY
Refills: 0
Start: 2022-10-07

## 2022-10-07 RX ORDER — POLYETHYLENE GLYCOL 3350 17 G/17G
17 POWDER, FOR SOLUTION ORAL DAILY PRN
Refills: 0
Start: 2022-10-07

## 2022-10-07 RX ORDER — BUMETANIDE 1 MG/1
1 TABLET ORAL DAILY
Refills: 0
Start: 2022-10-08

## 2022-10-07 RX ORDER — DIAPER,BRIEF,INFANT-TODD,DISP
EACH MISCELLANEOUS 4 TIMES DAILY PRN
Qty: 30 G | Refills: 0
Start: 2022-10-07

## 2022-10-07 RX ORDER — SENNOSIDES 8.6 MG
17.2 TABLET ORAL
Refills: 0
Start: 2022-10-07

## 2022-10-07 RX ORDER — ACETAMINOPHEN 325 MG/1
650 TABLET ORAL EVERY 6 HOURS PRN
Refills: 0
Start: 2022-10-07

## 2022-10-07 RX ORDER — METOPROLOL TARTRATE 50 MG/1
50 TABLET, FILM COATED ORAL 2 TIMES DAILY
Refills: 0
Start: 2022-10-07

## 2022-10-07 RX ADMIN — PNEUMOCOCCAL VACCINE POLYVALENT 0.5 ML
25; 25; 25; 25; 25; 25; 25; 25; 25; 25; 25; 25; 25; 25; 25; 25; 25; 25; 25; 25; 25; 25; 25 INJECTION, SOLUTION INTRAMUSCULAR; SUBCUTANEOUS at 13:51

## 2022-10-07 RX ADMIN — ASPIRIN 81 MG CHEWABLE TABLET 81 MG: 81 TABLET CHEWABLE at 08:43

## 2022-10-07 RX ADMIN — APIXABAN 5 MG: 5 TABLET, FILM COATED ORAL at 08:43

## 2022-10-07 RX ADMIN — METOPROLOL TARTRATE 50 MG: 50 TABLET, FILM COATED ORAL at 08:43

## 2022-10-07 RX ADMIN — DOCUSATE SODIUM 100 MG: 100 CAPSULE, LIQUID FILLED ORAL at 08:43

## 2022-10-07 NOTE — [place] UNIVERSAL TRANSFER FORM
[place] UNIVERSAL TRANSFER FORM  (ALL ITEMS MUST BE COMPLETED)    1  TRANSFER FROM: [address] [place] [address] Street: [facility] in [place]     2  DATE OF TRANSFER: 10/7/2022                        TIME OF TRANSFER: 1330    3  PATIENT NAME: STERLING Chaudhari      YOB: 1945                             GENDER: female    4  LANGUAGE:   English    5  PHYSICIAN NAME:  Diana Gomez MD                   PHONE: [phone]    6  CODE STATUS: Level 1 - Full Code        Out of Hospital DNR Attached: No    7  :                                      :  Extended Emergency Contact Information  Primary Emergency Contact: Lisa Hurt   [place]  Mobile Phone: [phone]  Relation: Daughter           Health Care Representative/Proxy:  Yes           Legal Guardian:  No             NAME OF:           HEALTH CARE REPRESENTATIVE/PROXY:                                         OR           LEGAL GUARDIAN, IF NOT :                                               PHONE:  (Day)           (Night)                        (Cell)    8  REASON FOR TRANSFER: (Must include brief medical history and recent changes in physical function or cognition ) Acute CHF exacerbation, Pt lives alone and has dementia  V/S: /81 (BP Location: Right arm)   Pulse 79   Temp 97 9 °F (36 6 °C) (Tympanic)   Resp 16   Ht 5' 7" (1 702 m)   Wt 85 4 kg (188 lb 4 8 oz)   SpO2 94%   BMI 29 49 kg/m²           PAIN: None    9  PRIMARY DIAGNOSIS: PAF (paroxysmal atrial fibrillation) (HCC)      Secondary Diagnosis:         Pacemaker: No      Internal Defib: No          Mental Health Diagnosis (if Applicable):    10  RESTRAINTS: No     11  RESPIRATORY NEEDS: None    12  ISOLATION/PRECAUTION: None    13  ALLERGY: Bee pollen and Pollen extract    14   SENSORY:       Vision Poor, Hearing Poor and Speech Difficult (mumbles at times, does not fully speak words clearly)    15  SKIN CONDITION: Yes:  Other L Heel pressure    16  DIET: Special (describe) Cardiac diet Fluid restriciton 1800mL, 2 gm sodium    17  IV ACCESS: None    18  PERSONAL ITEMS SENT WITH PATIENT: None    19  ATTACHED DOCUMENTS: MUST ATTACH CURRENT MEDICATION INFORMATION Face Sheet, MAR, Medication Reconciliation, Diagnostic Studies, Labs, Discharge Summary, PT Note, OT Note and HX/PE    20  AT RISK ALERTS:Falls and Pressure Ulcer        HARM TO: Others  -Verbally aggressive and physically aggressive (kicks, bites, and scratches), at times unpredictable behavior, requires multiple efforts to take medications and hygiene care, easily distracted  21  WEIGHT BEARING STATUS:         Left Leg: Full        Right Leg: Full    22  MENTAL STATUS:Alert, Forgetful and Disoriented    23  FUNCTION:        Walk: With Help ;Pt transferred with Min/Mod Ax2 and increased verbal cues from bed > chair/bedside commode  Transfer: With Help         Toilet: With Help        Feed: Self    24  IMMUNIZATIONS/SCREENING:     Immunization History   Administered Date(s) Administered    INFLUENZA 10/26/2013, 11/12/2016, 11/11/2017, 10/30/2018, 11/06/2020    Influenza Split High Dose Preservative Free IM 10/19/2019    Influenza, high dose seasonal 0 7 mL 10/03/2022    Pneumococcal Conjugate 13-Valent 11/12/2016    Pneumococcal Polysaccharide PPV23 10/03/2022    influenza, trivalent, adjuvanted 11/06/2020       25  BOWEL: Date Last BM10/6/22 brown and Comments incontinent at times    26  BLADDER: Comments incontinent at times    27   SENDING FACILITY CONTACT: Luke Tripp RN                   Title: RN        Unit: [address]        Phone: [phone] [address] (if known):        Title:        Unit:         Phone:         FORM PREFILLED BY (if applicable)       Title:       Unit:        Phone:         FORM COMPLETED BY Luke Tripp      Title: RODDY      Phone: [phone]

## 2022-10-07 NOTE — PLAN OF CARE
Problem: MOBILITY - ADULT  Goal: Maintain or return to baseline ADL function  Description: INTERVENTIONS:  -  Assess patient's ability to carry out ADLs; assess patient's baseline for ADL function and identify physical deficits which impact ability to perform ADLs (bathing, care of mouth/teeth, toileting, grooming, dressing, etc )  - Assess/evaluate cause of self-care deficits   - Assess range of motion  - Assess patient's mobility; develop plan if impaired  - Assess patient's need for assistive devices and provide as appropriate  - Encourage maximum independence but intervene and supervise when necessary  - Involve family in performance of ADLs  - Assess for home care needs following discharge   - Consider OT consult to assist with ADL evaluation and planning for discharge  - Provide patient education as appropriate  Outcome: Progressing  Goal: Maintains/Returns to pre admission functional level  Description: INTERVENTIONS:  - Perform BMAT or MOVE assessment daily    - Set and communicate daily mobility goal to care team and patient/family/caregiver  - Collaborate with rehabilitation services on mobility goals if consulted  - Perform Range of Motion 3 times a day  - Reposition patient every 2 hours    - Dangle patient 3 times a day  - Stand patient 3 times a day  - Ambulate patient 3 times a day  - Out of bed to chair 3 times a day   - Out of bed for meals 3 times a day  - Out of bed for toileting  - Record patient progress and toleration of activity level   Outcome: Progressing     Problem: PAIN - ADULT  Goal: Verbalizes/displays adequate comfort level or baseline comfort level  Description: Interventions:  - Encourage patient to monitor pain and request assistance  - Assess pain using appropriate pain scale  - Administer analgesics based on type and severity of pain and evaluate response  - Implement non-pharmacological measures as appropriate and evaluate response  - Consider cultural and social influences on pain and pain management  - Notify physician/advanced practitioner if interventions unsuccessful or patient reports new pain  Outcome: Progressing     Problem: SAFETY ADULT  Goal: Maintain or return to baseline ADL function  Description: INTERVENTIONS:  -  Assess patient's ability to carry out ADLs; assess patient's baseline for ADL function and identify physical deficits which impact ability to perform ADLs (bathing, care of mouth/teeth, toileting, grooming, dressing, etc )  - Assess/evaluate cause of self-care deficits   - Assess range of motion  - Assess patient's mobility; develop plan if impaired  - Assess patient's need for assistive devices and provide as appropriate  - Encourage maximum independence but intervene and supervise when necessary  - Involve family in performance of ADLs  - Assess for home care needs following discharge   - Consider OT consult to assist with ADL evaluation and planning for discharge  - Provide patient education as appropriate  Outcome: Progressing  Goal: Patient will remain free of falls  Description: INTERVENTIONS:  - Educate patient/family on patient safety including physical limitations  - Instruct patient to call for assistance with activity   - Consult OT/PT to assist with strengthening/mobility   - Keep Call bell within reach  - Keep bed low and locked with side rails adjusted as appropriate  - Keep care items and personal belongings within reach  - Initiate and maintain comfort rounds  - Make Fall Risk Sign visible to staff  - Offer Toileting every 2 Hours, in advance of need  - Initiate/Maintain bed alarm  - Obtain necessary fall risk management equipment: yes  - Apply yellow socks and bracelet for high fall risk patients  - Consider moving patient to room near nurses station  Outcome: Progressing     Problem: DISCHARGE PLANNING  Goal: Discharge to home or other facility with appropriate resources  Description: INTERVENTIONS:  - Identify barriers to discharge w/patient and caregiver  - Arrange for needed discharge resources and transportation as appropriate  - Identify discharge learning needs (meds, wound care, etc )  - Arrange for interpretive services to assist at discharge as needed  - Refer to Case Management Department for coordinating discharge planning if the patient needs post-hospital services based on physician/advanced practitioner order or complex needs related to functional status, cognitive ability, or social support system  Outcome: Progressing     Problem: CARDIOVASCULAR - ADULT  Goal: Maintains optimal cardiac output and hemodynamic stability  Description: INTERVENTIONS:  - Monitor I/O, vital signs and rhythm  - Monitor for S/S and trends of decreased cardiac output  - Administer and titrate ordered vasoactive medications to optimize hemodynamic stability  - Assess quality of pulses, skin color and temperature  - Assess for signs of decreased coronary artery perfusion  - Instruct patient to report change in severity of symptoms  Outcome: Progressing  Goal: Absence of cardiac dysrhythmias or at baseline rhythm  Description: INTERVENTIONS:  - Continuous cardiac monitoring, vital signs, obtain 12 lead EKG if ordered  - Administer antiarrhythmic and heart rate control medications as ordered  - Monitor electrolytes and administer replacement therapy as ordered  Outcome: Progressing     Problem: SKIN/TISSUE INTEGRITY - ADULT  Goal: Skin Integrity remains intact(Skin Breakdown Prevention)  Description: Assess:  -Perform Judah assessment every shift  -Clean and moisturize skin every shift  -Inspect skin when repositioning, toileting, and assisting with ADLS  -Assess under medical devices   -Assess extremities for adequate circulation and sensation     Bed Management:  -Have minimal linens on bed & keep smooth, unwrinkled  -Change linens as needed when moist or perspiring  -Avoid sitting or lying in one position for more than 2 hours while in bed  -Keep HOB at 30degrees Toileting:  -Offer bedside commode  -Assess for incontinence every 2 hrs  -Use incontinent care products after each incontinent episode     Activity:  -Mobilize patient 3 times a day  -Encourage activity and walks on unit  -Encourage or provide ROM exercises   -Turn and reposition patient every 2 Hours  -Use appropriate equipment to lift or move patient in bed  -Instruct/ Assist with weight shifting every 2 when out of bed in chair  -Consider limitation of chair time 2 hour intervals    Skin Care:  -Avoid use of baby powder, tape, friction and shearing, hot water or constrictive clothing  -Relieve pressure over bony prominences using Ehob cushion  -Do not massage red bony areas    Next Steps:  -Teach patient strategies to minimize risks    -Consider consults to  interdisciplinary teams   Outcome: Progressing     Problem: MUSCULOSKELETAL - ADULT  Goal: Maintain or return mobility to safest level of function  Description: INTERVENTIONS:  - Assess patient's ability to carry out ADLs; assess patient's baseline for ADL function and identify physical deficits which impact ability to perform ADLs (bathing, care of mouth/teeth, toileting, grooming, dressing, etc )  - Assess/evaluate cause of self-care deficits   - Assess range of motion  - Assess patient's mobility  - Assess patient's need for assistive devices and provide as appropriate  - Encourage maximum independence but intervene and supervise when necessary  - Involve family in performance of ADLs  - Assess for home care needs following discharge   - Consider OT consult to assist with ADL evaluation and planning for discharge  - Provide patient education as appropriate  Outcome: Progressing     Problem: Potential for Falls  Goal: Patient will remain free of falls  Description: INTERVENTIONS:  - Educate patient/family on patient safety including physical limitations  - Instruct patient to call for assistance with activity   - Consult OT/PT to assist with strengthening/mobility   - Keep Call bell within reach  - Keep bed low and locked with side rails adjusted as appropriate  - Keep care items and personal belongings within reach  - Initiate and maintain comfort rounds  - Make Fall Risk Sign visible to staff  - Offer Toileting every 2 Hours, in advance of need  - Initiate/Maintain bed alarm  - Obtain necessary fall risk management equipment: yes  - Apply yellow socks and bracelet for high fall risk patients  - Consider moving patient to room near nurses station  Outcome: Progressing     Problem: Prexisting or High Potential for Compromised Skin Integrity  Goal: Skin integrity is maintained or improved  Description: INTERVENTIONS:  - Identify patients at risk for skin breakdown  - Assess and monitor skin integrity  - Assess and monitor nutrition and hydration status  - Monitor labs   - Assess for incontinence   - Turn and reposition patient  - Assist with mobility/ambulation  - Relieve pressure over bony prominences  - Avoid friction and shearing  - Provide appropriate hygiene as needed including keeping skin clean and dry  - Evaluate need for skin moisturizer/barrier cream  - Collaborate with interdisciplinary team   - Patient/family teaching  - Consider wound care consult   Outcome: Progressing     Problem: SAFETY,RESTRAINT: NV/NON-SELF DESTRUCTIVE BEHAVIOR  Goal: Remains free of harm/injury (restraint for non violent/non self-detsructive behavior)  Description: INTERVENTIONS:  - Instruct patient/family regarding restraint use   - Assess and monitor physiologic and psychological status   - Provide interventions and comfort measures to meet assessed patient needs   - Identify and implement measures to help patient regain control  - Assess readiness for release of restraint   Outcome: Progressing  Goal: Returns to optimal restraint-free functioning  Description: INTERVENTIONS:  - Assess the patient's behavior and symptoms that indicate continued need for restraint  - Identify and implement measures to help patient regain control  - Assess readiness for release of restraint   Outcome: Progressing     Problem: Nutrition/Hydration-ADULT  Goal: Nutrient/Hydration intake appropriate for improving, restoring or maintaining nutritional needs  Description: Monitor and assess patient's nutrition/hydration status for malnutrition  Collaborate with interdisciplinary team and initiate plan and interventions as ordered  Monitor patient's weight and dietary intake as ordered or per policy  Utilize nutrition screening tool and intervene as necessary  Determine patient's food preferences and provide high-protein, high-caloric foods as appropriate       INTERVENTIONS:  - Monitor oral intake, urinary output, labs, and treatment plans  - Assess nutrition and hydration status and recommend course of action  - Evaluate amount of meals eaten  - Assist patient with eating if necessary   - Allow adequate time for meals  - Recommend/ encourage appropriate diets, oral nutritional supplements, and vitamin/mineral supplements  - Order, calculate, and assess calorie counts as needed  - Recommend, monitor, and adjust tube feedings and TPN/PPN based on assessed needs  - Assess need for intravenous fluids  - Provide specific nutrition/hydration education as appropriate  - Include patient/family/caregiver in decisions related to nutrition  Outcome: Progressing

## 2022-10-07 NOTE — CASE MANAGEMENT
Case Management Discharge Planning Note    Patient name Yarely Pa  Location [address] [place] [address] MRN [id number]  : 1945 Date 10/7/2022       Current Admission Date: 2022  Current Admission Diagnosis:PAF (paroxysmal atrial fibrillation) [facility])   Patient Active Problem List    Diagnosis Date Noted    Mitral valve regurgitation due to and not concurrent with acute myocardial infarction 10/03/2022    PAF (paroxysmal atrial fibrillation) ([place] [address] ) 2022    Infestation by bed bug 2022    Rash and nonspecific skin eruption 2022    Dementia with behavioral disturbance 2022    Chronic obstructive pulmonary disease, unspecified COPD type ([place] [address] ) 2022    Acute exacerbation of CHF (congestive heart failure) ([place] [address] ) 2022    Vasomotor rhinitis 2022    Left ovarian cyst 2022    Stage 3a chronic kidney disease ([place] [address] ) 2022    Asymptomatic microscopic hematuria 2022    Chest pain 2021    CKD (chronic kidney disease) stage 4, GFR 15-29 ml/min ([place] [address] ) 2021    Abdominal aortic aneurysm (AAA) without rupture 2020    Belching 2020    Dyspepsia 2020    Colon cancer screening 2020    Nausea 2020    Essential hypertension     Coronary artery disease involving native coronary artery of native heart without angina pectoris     Arthritis       LOS (days): 8  Geometric Mean LOS (GMLOS) (days): 3 90  Days to GMLOS:-3 7     OBJECTIVE:  Risk of Unplanned Readmission Score: 15 55         Current admission status: Inpatient   Preferred Pharmacy:   [facility] [address], [address] [place] [address] [address] [place] [address]  [address]  Phone: [phone] Fax: [phone]    Primary Care Provider: Archana Murphy MD    Primary Insurance: MEDICARE  Secondary Insurance: COMMERCIAL MISCELLANEOUS    DISCHARGE DETAILS:    Discharge planning discussed with[de-identified] Jose L Irizarry of Choice: Yes Other Referral/Resources/Interventions Provided:  Interventions: Short Term Rehab, SNF  Referral Comments: CM s/w Marleni Showers yesterday evening who stated she had a bed at both [address] and [facility] for tomorrow  Marleni Showers states she will hold both bed overnight and the daughter can pick which facility she will prefer in the AM  CM reached out to daughter Mallory Govea this morning to review choices  Mallory Govea chooses [facility] d/t proximity to her home and having another friend/family member there  CM reviewed that patient cannot bring any of her belongings to [facility] d/t Bed Bugs on arrival  Lender Pantera scruggs and states she plans to got out and buy her some new clothes, shoes, and makeup today to bring to the facility  CM reached back out to Marleni Showers with [facility] to make her aware of choice for [facility]  Cm requested BLS transport to [facility] for 12pm today and ride confirmed for 1130pm  by [employer]  Nurse Vinnie Apley, daughter Niranjan Dan from [address] admissions all aware           Treatment Team Recommendation: Short Term Rehab, SNF  Discharge Destination Plan[de-identified] Short Term Rehab, SNF  Transport at Discharge : BLS Ambulance  Dispatcher Contacted: Yes  Number/Name of Dispatcher: [facility]  Transported by [employer] and Unit #): [place]  ETA of Transport (Date): 10/07/22  ETA of Transport (Time): 1330     Transfer Mode: Stretcher  Accompanied by: EMS personnel  Transfer Equipment: BLS devices  IMM Given (Date):: 10/06/22  IMM Given to[de-identified] Family  Family notified[de-identified] [address] [place] [address] Name, [address] : [facility]  Receiving Facility/Agency Phone Number: [phone]  Facility/Agency Fax Number: [phone]

## 2022-10-12 ENCOUNTER — PATIENT OUTREACH (OUTPATIENT)
Dept: CASE MANAGEMENT | Facility: HOSPITAL | Age: 77
End: 2022-10-12

## 2022-10-19 ENCOUNTER — PATIENT OUTREACH (OUTPATIENT)
Dept: CASE MANAGEMENT | Facility: HOSPITAL | Age: 77
End: 2022-10-19

## 2022-11-02 ENCOUNTER — PATIENT OUTREACH (OUTPATIENT)
Dept: CASE MANAGEMENT | Facility: HOSPITAL | Age: 77
End: 2022-11-02

## 2022-11-06 ENCOUNTER — APPOINTMENT (EMERGENCY)
Dept: CT IMAGING | Facility: HOSPITAL | Age: 77
End: 2022-11-06

## 2022-11-06 ENCOUNTER — APPOINTMENT (EMERGENCY)
Dept: RADIOLOGY | Facility: HOSPITAL | Age: 77
End: 2022-11-06

## 2022-11-06 ENCOUNTER — HOSPITAL ENCOUNTER (EMERGENCY)
Facility: HOSPITAL | Age: 77
Discharge: NURSING, REHAB OR LONG-TERM CARE | End: 2022-11-06

## 2022-11-06 VITALS
HEART RATE: 50 BPM | RESPIRATION RATE: 18 BRPM | SYSTOLIC BLOOD PRESSURE: 130 MMHG | DIASTOLIC BLOOD PRESSURE: 59 MMHG | TEMPERATURE: 97.9 F | WEIGHT: 214.29 LBS | OXYGEN SATURATION: 95 %
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
## 2022-11-06 LAB
BASE EXCESS BLDA CALC-SCNC: 4 MMOL/L (ref -2–3)
CA-I BLD-SCNC: 1.17 MMOL/L (ref 1.12–1.32)
GLUCOSE SERPL-MCNC: 89 MG/DL (ref 65–140)
HCO3 BLDA-SCNC: 29.5 MMOL/L (ref 24–30)
HCT VFR BLD CALC: 35 % (ref 34.8–46.1)
HGB BLDA-MCNC: 11.9 G/DL (ref 11.5–15.4)
PCO2 BLD: 31 MMOL/L (ref 21–32)
PCO2 BLD: 44.9 MM HG (ref 42–50)
PH BLD: 7.43 [PH] (ref 7.3–7.4)
PO2 BLD: 35 MM HG (ref 35–45)
POTASSIUM BLD-SCNC: 4.3 MMOL/L (ref 3.5–5.3)
SAO2 % BLD FROM PO2: 69 % (ref 60–85)
SODIUM BLD-SCNC: 143 MMOL/L (ref 136–145)
SPECIMEN SOURCE: ABNORMAL

## 2022-11-06 NOTE — DISCHARGE INSTRUCTIONS
Traumatic Laceration and Wound Care Instructions:     Wound Care:  - Wash laceration/wound daily, gently in the shower, do not scrub  Pat dry with clean towel  Do NOT immerse completely in water (i e  tub or swimming pool) for 2 weeks  - Follow-up with the Trauma Service  - Call office if you develop fever/chills, redness/swelling/drainage from the site  Additional Instructions:  - If you have any questions or concerns after discharge please call the office   - Call office or return to ER if fever greater than 101, chills, increasing redness/swelling at site of laceration/wound, purulent or foul smelling drainage from laceration/wound, and/or worsening/uncontrollable pain

## 2022-11-06 NOTE — ED NOTES
Report given to Sunil @ [address] [place] [address] and [facility] [address] [place] [address]  11/06/22 2874

## 2022-11-06 NOTE — ED NOTES
IVETH called for transportation home   Transport scheduled for 0700 am per Meka Recinosing from [facility]  11/06/22 7434

## 2022-11-06 NOTE — PROCEDURES
Laceration repair    Date/Time: 11/6/2022 4:19 AM  Performed by: Yani Jasso PA-C  Authorized by: Yani Jasso PA-C   Consent: Verbal consent obtained    Consent given by: patient  Patient identity confirmed: verbally with patient  Body area: head/neck  Location details: scalp  Laceration length: 1 cm    Wound Dehiscence:  Superficial Wound Dehiscence: simple closure      Procedure Details:  Irrigation solution: saline  Irrigation method: syringe  Amount of cleaning: standard  Skin closure: glue  Patient tolerance: patient tolerated the procedure well with no immediate complications

## 2022-11-06 NOTE — H&P
Date of Service: 2017    ONCOLOGY SURGERY CONSULTATION    REASON FOR CONSULTATION:  Possible carcinomatosis.    This is a 61-year-old male who has a history of rectal adenocarcinoma who is status post ileostomy on 2017.  The patient is followed by oncology as he has been on Xeloda and radiation treatment.  The patient has had previous 2 bowel obstructions in the past.  He has also been treated for C. diff colitis.  He recently presented to the emergency room with increasing abdominal discomfort, nausea and vomiting.  There was some concern that he may have a bowel obstruction.  In any event, he had recent imaging studies, a CT of the abdomen and pelvis.  The CT showed dilated loops of small bowel measuring up to 40 mm, mostly on the left side of the abdomen.  He has an intact right-sided ileostomy.  The noted narrowing in the proximal small bowel to the ileostomy findings were concerning for small-bowel obstruction.  There is interval hyperdensity mentioned, 2 cm right kidney exophytic cyst, now appears to be more of a hemorrhagic cyst.  The gallbladder appears to be distended.  The patient had denied any fever, chills or sweats.  In any event, we have been asked to evaluate the patient for concern for possible carcinomatosis.    PAST MEDICAL HISTORY:  History of rectal adenocarcinoma, chronic anemia, heart failure, hypercholesterolemia, malnourished.    PAST SURGICAL HISTORY:  Colon surgery with ileostomy.    MEDICATIONS:  Coreg.  Folate.  Vitamin B1.  Klor-Con.  Magnesium.    ALLERGIES:  No known drug allergies.    SOCIAL HISTORY:  A pack a day smoker, quit 2 weeks ago.  No alcohol history noted.    FAMILY HISTORY:  Noted for cancer from a sister of unknown etiology.  Also, brother with stomach cancer.  Mother  of stomach cancer.    REVIEW OF SYSTEMS:  Again, recent nausea, vomiting, abdominal discomfort.  Denies any fever, chills, sweats or chest pain.  All other review of systems reviewed and are  H&P - Trauma   Alicja Paula 68 y o  adult MRN: [id number]  Unit/Bed#: ED-23 Encounter: [id number]    Trauma Alert: Level B   Model of Arrival: Ambulance    Trauma Team: Attending Leslye Simental and MARINO Alvarez  Consultants:     None     Assessment/Plan   Active Problems / Assessment:   Fall with unknown head strike on anticoagulation  No acute traumatic injuries     Plan:   D/C to facility    Cervical Collar Clearance: The patient had a CT scan of the cervical spine demonstrating no acute injury  On exam, the patient had no midline point tenderness or paresthesias/numbness/weakness in the extremities  The patient had full range of motion (was then able to flex, extend, and rotate head laterally) without pain  There were no distracting injuries and the patient was not intoxicated  The patient's cervical spine was cleared radiologically and clinically  Cervical collar removed at this time  History of Present Illness     Chief Complaint: No compliants  Mechanism:Fall     HPI:    Alicja Paula is a 68 y o  adult who presents after being found sitting next to her bed at her memory care facility  She has a history of dementia with unknown head strike  Patient denies pain  Review of Systems   Constitutional: Negative for activity change, appetite change, diaphoresis, fatigue and fever  HENT: Negative for congestion, ear discharge, ear pain, facial swelling, hearing loss, mouth sores, nosebleeds, postnasal drip, rhinorrhea, sinus pressure, sinus pain, sneezing and sore throat  Eyes: Negative for photophobia, pain and redness  Respiratory: Negative for cough, chest tightness, shortness of breath and wheezing  Cardiovascular: Negative for chest pain and palpitations  Gastrointestinal: Negative for abdominal distention, abdominal pain, blood in stool, constipation, diarrhea, nausea and vomiting  Genitourinary: Negative for dysuria, frequency, hematuria and urgency     Musculoskeletal: Negative for back pain and neck pain  Skin: Negative for wound  Neurological: Negative for dizziness, seizures, syncope, weakness, numbness and headaches  12-point, complete review of systems was reviewed and negative except as stated above  Historical Information     Past Medical History:   Diagnosis Date   • CHF (congestive heart failure) ([facility] )    • CKD (chronic kidney disease)    • COPD (chronic obstructive pulmonary disease) ([facility] )      History reviewed  No pertinent surgical history  Unable to obtain history due to Dementia         There is no immunization history on file for this patient  Last Tetanus: n/a  Family History: Non-contributory    1  Before the illness or injury that brought you to the Emergency, did you need someone to help you on a regular basis? 1=Yes   2  Since the illness or injury that brought you to the Emergency, have you needed more help than usual to take care of yourself? 0=No   3  Have you been hospitalized for one or more nights during the past 6 months (excluding a stay in the Emergency Department)? 0=No   4  In general, do you see well? 0=Yes   5  In general, do you have serious problems with your memory? 1=Yes   6  Do you take more than three different medications everyday?  1=Yes   TOTAL   3     Did you order a geriatric consult if the score was 2 or greater?: If patient admitted will consult     Meds/Allergies   all current active meds have been reviewed     Allergies   Allergen Reactions   • Bee Pollen Allergic Rhinitis       Objective   Initial Vitals:   Temperature: 97 9 °F (36 6 °C) (11/06/22 0305)  Pulse: 76 (11/06/22 0305)  Respirations: 18 (11/06/22 0305)  Blood Pressure: 128/74 (11/06/22 0305)    Primary Survey:   Airway:        Status: patent;        Pre-hospital Interventions: none        Hospital Interventions: none  Breathing:        Pre-hospital Interventions: none       Effort: normal       Right breath sounds: normal       Left breath sounds: normal  Circulation: negative.    PHYSICAL EXAMINATION:  VITAL SIGNS:  Blood pressure is 116/68, heart rate 68, respirations 16, temperature 98.1.  GENERAL:  A pleasant 61-year-old male, appears stated age.  He appears somewhat underweight but no distress.  HEENT:  Normocephalic, atraumatic, PERRLA, sclerae appearing anicteric.  Nasal mucosa clear.  NECK:  Supple.  LUNGS:  Diminished, no wheezes or rales.  CARDIAC:  Regular rate and rhythm without murmur, thrills or gallops.  ABDOMEN:  Soft at this time.  He does have some stool in his ileostomy bag.  MUSCULOSKELETAL:  Range of motion is intact.  No CVA tenderness.  NEUROLOGIC:  Awake and oriented times 3.  Memory is intact.  Speech is clear.  Motor and sensory intact throughout.  SKIN:  No rash, breakdown or jaundice.    RECENT LABORATORY DATA:  White count 5.7, hemoglobin 12, hematocrit 36 and platelet count 275,000.  Creatinine is 1.0.  Sodium 133, potassium 4.1, bilirubin was 0.5.      Imaging personally reviewed.  CT abdomen and pelvis reflected a concern for small bowel dilated loops on the left side of his abdomen with some concern for narrowing.  There was no suggestion of any metastatic type of disease on imaging.  The patient had an upper small bowel follow-through series yesterday indicating no evidence of any bowel obstruction.  The contrast did completely go through the small bowel into his ostomy bag without hesitation.    IMPRESSION:  History of locally advanced rectal carcinoma on chemotherapy and radiation treatment, history of recurrent small-bowel obstruction, appears to have resolved.    PLAN:  At this time, there is no evidence or indication for any surgical intervention.  Will follow and monitor the patient's hospital course and I appreciate being involved in the care of the patient.  Recommend advancement of diet and return to treatment.  Discussed with Dr. Estrella.    Thank you for the kind referral.        Doug Cruz MD FACS  Surgical Oncologist  Board  Certified in Complex General Surgical Oncology   & General Surgery    [facility]  [address] [place] [address], [address]  [place] [address]  Office:  [phone]      Ilya Dozier PA-C for Dr. Doug Cruz, spending greater than 45 minutes at bedside evaluation and workup, greater than 50% of the time was counseling and discussing treatment for the patient if indicated.      Dictated By: Ilya Dozier PA-C  Signing Provider: Doug Cruz MD    SC/lhg ([id number])  DD: 05/31/2017 08:39:24 TD: 05/31/2017 09:06:28    Copy Sent To:      Rhythm: regular       Rate: regular   Right Pulses Left Pulses    R radial: 2+  R femoral: 2+  R pedal: 2+     L radial: 2+  L femoral: 2+  L pedal: 2+       Disability:        GCS: Eye: 4; Verbal: 5 Motor: 6 Total: 15       Right Pupil: round;  reactive         Left Pupil:  round;  reactive      R Motor Strength L Motor Strength    R : 5/5  R dorsiflex: 5/5  R plantarflex: 5/5 L : 5/5  L dorsiflex: 5/5  L plantarflex: 5/5        Sensory:  No sensory deficit  Exposure:       Completed: Yes      Secondary Survey:  Physical Exam  Vitals and nursing note reviewed  Constitutional:       General: Jimena Mosley is not in acute distress  Appearance: Normal appearance  Jimena Copper is not ill-appearing or toxic-appearing  HENT:      Head: Normocephalic and atraumatic  Right Ear: Tympanic membrane normal       Left Ear: Tympanic membrane normal       Nose: Nose normal  No congestion or rhinorrhea  Mouth/Throat:      Mouth: Mucous membranes are moist       Pharynx: Oropharynx is clear  No oropharyngeal exudate  Eyes:      Extraocular Movements: Extraocular movements intact  Conjunctiva/sclera: Conjunctivae normal       Pupils: Pupils are equal, round, and reactive to light  Cardiovascular:      Rate and Rhythm: Normal rate and regular rhythm  Heart sounds: No murmur heard  No friction rub  No gallop  Pulmonary:      Effort: Pulmonary effort is normal       Breath sounds: No wheezing, rhonchi or rales  Abdominal:      General: Abdomen is flat  There is no distension  Palpations: Abdomen is soft  Tenderness: There is no abdominal tenderness  There is no guarding or rebound     Musculoskeletal:      Right shoulder: Normal       Left shoulder: Normal       Right upper arm: Normal       Left upper arm: Normal       Right elbow: Normal       Left elbow: Normal       Right forearm: Normal       Left forearm: Normal       Right wrist: Normal       Left wrist: Normal       Right hand: Normal       Left hand: Normal       Cervical back: No deformity or tenderness  Thoracic back: No deformity or tenderness  Lumbar back: Normal  No swelling, deformity or tenderness  Right hip: Normal       Left hip: Normal       Right upper leg: Normal       Left upper leg: Normal       Right knee: Normal       Left knee: Normal       Right lower leg: Normal       Left lower leg: Normal       Right ankle: Normal       Left ankle: Normal       Right foot: Normal       Left foot: Normal    Skin:     General: Skin is warm and dry  Capillary Refill: Capillary refill takes less than 2 seconds  Findings: No bruising  Neurological:      General: No focal deficit present  Mental Status: Silvano William is alert  Silvano William is disoriented and confused  GCS: GCS eye subscore is 4  GCS verbal subscore is 5  GCS motor subscore is 6  Sensory: Sensation is intact  Motor: Motor function is intact  Invasive Devices  Report    Peripheral Intravenous Line  Duration           Peripheral IV 11/06/22 Left Forearm <1 day              Lab Results: BMP/CMP: No results found for: SODIUM, K, CL, CO2, ANIONGAP, BUN, CREATININE, GLUCOSE, CALCIUM, AST, ALT, ALKPHOS, PROT, BILITOT, EGFR and CBC: No results found for: WBC, HGB, HCT, MCV, PLT, ADJUSTEDWBC, MCH, MCHC, RDW, MPV, NRBC    Imaging Results: I have personally reviewed pertinent reports      Chest Xray(s): negative for acute findings   FAST exam(s): negative for acute findings   CT Scan(s): negative for acute findings   Additional Xray(s): N/A     Other Studies: none    Code Status: No Order

## 2022-12-19 ENCOUNTER — PATIENT OUTREACH (OUTPATIENT)
Dept: FAMILY MEDICINE CLINIC | Facility: CLINIC | Age: 77
End: 2022-12-19

## 2023-01-01 ENCOUNTER — HOME CARE VISIT (OUTPATIENT)
Dept: HOME HOSPICE | Facility: HOSPICE | Age: 78
End: 2023-01-01
Payer: MEDICARE

## 2023-01-01 ENCOUNTER — HOME CARE VISIT (OUTPATIENT)
Dept: HOME HEALTH SERVICES | Facility: HOME HEALTHCARE | Age: 78
End: 2023-01-01
Payer: MEDICARE

## 2023-01-01 ENCOUNTER — HOSPICE ADMISSION (OUTPATIENT)
Dept: HOME HOSPICE | Facility: HOSPICE | Age: 78
End: 2023-01-01
Payer: MEDICARE

## 2023-01-01 VITALS — RESPIRATION RATE: 22 BRPM | HEART RATE: 96 BPM

## 2023-01-01 VITALS
SYSTOLIC BLOOD PRESSURE: 108 MMHG | TEMPERATURE: 96.8 F | DIASTOLIC BLOOD PRESSURE: 52 MMHG | RESPIRATION RATE: 20 BRPM | HEART RATE: 84 BPM

## 2023-01-01 DIAGNOSIS — Z51.5 HOSPICE CARE: Primary | ICD-10-CM

## 2023-01-01 PROCEDURE — G0156 HHCP-SVS OF AIDE,EA 15 MIN: HCPCS

## 2023-01-01 PROCEDURE — G0299 HHS/HOSPICE OF RN EA 15 MIN: HCPCS

## 2023-01-01 PROCEDURE — G0155 HHCP-SVS OF CSW,EA 15 MIN: HCPCS

## 2023-01-01 PROCEDURE — 10330087 HSPC SERVICE INTENSITY ADD-ON

## 2023-01-01 PROCEDURE — G0300 HHS/HOSPICE OF LPN EA 15 MIN: HCPCS

## 2023-01-01 PROCEDURE — 10330057 MEDICATION, GENERAL

## 2023-01-01 RX ORDER — LORAZEPAM 0.5 MG/1
TABLET ORAL
Qty: 40 TABLET | Refills: 1 | Status: SHIPPED | OUTPATIENT
Start: 2023-01-01 | End: 2023-01-01

## 2023-01-01 RX ORDER — OXYCODONE HYDROCHLORIDE 5 MG/1
TABLET ORAL
Qty: 40 TABLET | Refills: 0 | Status: SHIPPED | OUTPATIENT
Start: 2023-01-01 | End: 2023-01-01

## 2023-01-01 RX ORDER — LORAZEPAM 2 MG/ML
CONCENTRATE ORAL
Qty: 60 ML | Refills: 0 | Status: SHIPPED | OUTPATIENT
Start: 2023-01-01 | End: 2023-09-25 | Stop reason: CLARIF

## 2023-01-01 RX ORDER — MORPHINE SULFATE 100 MG/5ML
SOLUTION, CONCENTRATE ORAL
Qty: 60 ML | Refills: 0 | Status: SHIPPED | OUTPATIENT
Start: 2023-01-01 | End: 2023-09-25 | Stop reason: CLARIF

## 2023-01-05 ENCOUNTER — PATIENT OUTREACH (OUTPATIENT)
Dept: FAMILY MEDICINE CLINIC | Facility: CLINIC | Age: 78
End: 2023-01-05

## 2023-02-28 ENCOUNTER — APPOINTMENT (EMERGENCY)
Dept: RADIOLOGY | Facility: HOSPITAL | Age: 78
End: 2023-02-28

## 2023-02-28 ENCOUNTER — HOSPITAL ENCOUNTER (OUTPATIENT)
Facility: HOSPITAL | Age: 78
Setting detail: OBSERVATION
Discharge: NURSING, REHAB OR LONG-TERM CARE | End: 2023-03-01
Attending: SURGERY | Admitting: STUDENT IN AN ORGANIZED HEALTH CARE EDUCATION/TRAINING PROGRAM

## 2023-02-28 ENCOUNTER — APPOINTMENT (EMERGENCY)
Dept: CT IMAGING | Facility: HOSPITAL | Age: 78
End: 2023-02-28

## 2023-02-28 DIAGNOSIS — S12.9XXA CLOSED FRACTURE OF CERVICAL VERTEBRAL BODY (HCC): ICD-10-CM

## 2023-02-28 DIAGNOSIS — S12.401A CLOSED NONDISPLACED FRACTURE OF FIFTH CERVICAL VERTEBRA, UNSPECIFIED FRACTURE MORPHOLOGY, INITIAL ENCOUNTER (HCC): ICD-10-CM

## 2023-02-28 DIAGNOSIS — S01.21XA LACERATION OF NOSE, INITIAL ENCOUNTER: ICD-10-CM
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
## 2023-02-28 LAB
BASE EXCESS BLDA CALC-SCNC: 6 MMOL/L (ref -2–3)
BASOPHILS # BLD AUTO: 0.04 THOUSANDS/ÂΜL (ref 0–0.1)
BASOPHILS NFR BLD AUTO: 1 % (ref 0–1)
CA-I BLD-SCNC: 1.23 MMOL/L (ref 1.12–1.32)
EOSINOPHIL # BLD AUTO: 0.27 THOUSAND/ÂΜL (ref 0–0.61)
EOSINOPHIL NFR BLD AUTO: 4 % (ref 0–6)
ERYTHROCYTE [DISTWIDTH] IN BLOOD BY AUTOMATED COUNT: 16.5 % (ref 11.6–15.1)
GLUCOSE SERPL-MCNC: 96 MG/DL (ref 65–140)
HCO3 BLDA-SCNC: 33 MMOL/L (ref 24–30)
HCT VFR BLD AUTO: 36.9 % (ref 34.8–46.1)
HCT VFR BLD CALC: 36 % (ref 34.8–46.1)
HGB BLD-MCNC: 11.4 G/DL (ref 11.5–15.4)
HGB BLDA-MCNC: 12.2 G/DL (ref 11.5–15.4)
IMM GRANULOCYTES # BLD AUTO: 0.02 THOUSAND/UL (ref 0–0.2)
IMM GRANULOCYTES NFR BLD AUTO: 0 % (ref 0–2)
LYMPHOCYTES # BLD AUTO: 1.49 THOUSANDS/ÂΜL (ref 0.6–4.47)
LYMPHOCYTES NFR BLD AUTO: 25 % (ref 14–44)
MCH RBC QN AUTO: 30.2 PG (ref 26.8–34.3)
MCHC RBC AUTO-ENTMCNC: 30.9 G/DL (ref 31.4–37.4)
MCV RBC AUTO: 98 FL (ref 82–98)
MONOCYTES # BLD AUTO: 0.83 THOUSAND/ÂΜL (ref 0.17–1.22)
MONOCYTES NFR BLD AUTO: 14 % (ref 4–12)
NEUTROPHILS # BLD AUTO: 3.44 THOUSANDS/ÂΜL (ref 1.85–7.62)
NEUTS SEG NFR BLD AUTO: 56 % (ref 43–75)
NRBC BLD AUTO-RTO: 0 /100 WBCS
PCO2 BLD: 35 MMOL/L (ref 21–32)
PCO2 BLD: 55.9 MM HG (ref 42–50)
PH BLD: 7.38 [PH] (ref 7.3–7.4)
PLATELET # BLD AUTO: 163 THOUSANDS/UL (ref 149–390)
PMV BLD AUTO: 11 FL (ref 8.9–12.7)
PO2 BLD: 17 MM HG (ref 35–45)
POTASSIUM BLD-SCNC: 4.4 MMOL/L (ref 3.5–5.3)
RBC # BLD AUTO: 3.78 MILLION/UL (ref 3.81–5.12)
SAO2 % BLD FROM PO2: 22 % (ref 60–85)
SODIUM BLD-SCNC: 140 MMOL/L (ref 136–145)
SPECIMEN SOURCE: ABNORMAL
WBC # BLD AUTO: 6.09 THOUSAND/UL (ref 4.31–10.16)

## 2023-02-28 RX ORDER — LIDOCAINE HYDROCHLORIDE 10 MG/ML
10 INJECTION, SOLUTION EPIDURAL; INFILTRATION; INTRACAUDAL; PERINEURAL ONCE
Status: COMPLETED | OUTPATIENT
Start: 2023-03-01 | End: 2023-03-01

## 2023-02-28 RX ADMIN — IOHEXOL 100 ML: 350 INJECTION, SOLUTION INTRAVENOUS at 23:58

## 2023-03-01 ENCOUNTER — APPOINTMENT (EMERGENCY)
Dept: RADIOLOGY | Facility: HOSPITAL | Age: 78
End: 2023-03-01

## 2023-03-01 ENCOUNTER — APPOINTMENT (OUTPATIENT)
Dept: CT IMAGING | Facility: HOSPITAL | Age: 78
End: 2023-03-01

## 2023-03-01 VITALS
TEMPERATURE: 98.7 F | HEIGHT: 67 IN | OXYGEN SATURATION: 94 % | DIASTOLIC BLOOD PRESSURE: 78 MMHG | BODY MASS INDEX: 29.62 KG/M2 | HEART RATE: 63 BPM | SYSTOLIC BLOOD PRESSURE: 145 MMHG | WEIGHT: 188.71 LBS | RESPIRATION RATE: 18 BRPM
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
## 2023-03-01 PROBLEM — N18.4 CHRONIC KIDNEY DISEASE (CKD), STAGE IV (SEVERE) (HCC): Status: ACTIVE | Noted: 2023-03-01

## 2023-03-01 PROBLEM — S12.9XXA CLOSED FRACTURE OF CERVICAL VERTEBRAL BODY (HCC): Status: ACTIVE | Noted: 2023-03-01

## 2023-03-01 PROBLEM — I10 HTN (HYPERTENSION): Status: ACTIVE | Noted: 2023-03-01

## 2023-03-01 PROBLEM — S12.401D CLOSED NONDISPLACED FRACTURE OF FIFTH CERVICAL VERTEBRA WITH ROUTINE HEALING: Status: ACTIVE | Noted: 2023-03-01

## 2023-03-01 PROBLEM — F03.90 DEMENTIA (HCC): Status: ACTIVE | Noted: 2023-03-01

## 2023-03-01 PROBLEM — I50.9 CHF (CONGESTIVE HEART FAILURE) (HCC): Status: ACTIVE | Noted: 2023-03-01

## 2023-03-01 LAB
ABO GROUP BLD: NORMAL
ABO GROUP BLD: NORMAL
ALBUMIN SERPL BCP-MCNC: 3.4 G/DL (ref 3.5–5)
ALP SERPL-CCNC: 81 U/L (ref 34–104)
ALT SERPL W P-5'-P-CCNC: 27 U/L (ref 7–52)
ANION GAP SERPL CALCULATED.3IONS-SCNC: 6 MMOL/L (ref 4–13)
ANION GAP SERPL CALCULATED.3IONS-SCNC: 7 MMOL/L (ref 4–13)
AST SERPL W P-5'-P-CCNC: 45 U/L (ref 13–39)
ATRIAL RATE: 70 BPM
BASOPHILS # BLD AUTO: 0.05 THOUSANDS/ÂΜL (ref 0–0.1)
BASOPHILS NFR BLD AUTO: 1 % (ref 0–1)
BILIRUB SERPL-MCNC: 0.69 MG/DL (ref 0.2–1)
BLD GP AB SCN SERPL QL: NEGATIVE
BUN SERPL-MCNC: 27 MG/DL (ref 5–25)
BUN SERPL-MCNC: 29 MG/DL (ref 5–25)
CALCIUM ALBUM COR SERPL-MCNC: 9.6 MG/DL (ref 8.3–10.1)
CALCIUM SERPL-MCNC: 8.8 MG/DL (ref 8.4–10.2)
CALCIUM SERPL-MCNC: 9.1 MG/DL (ref 8.4–10.2)
CHLORIDE SERPL-SCNC: 102 MMOL/L (ref 96–108)
CHLORIDE SERPL-SCNC: 104 MMOL/L (ref 96–108)
CO2 SERPL-SCNC: 26 MMOL/L (ref 21–32)
CO2 SERPL-SCNC: 28 MMOL/L (ref 21–32)
CREAT SERPL-MCNC: 1.48 MG/DL (ref 0.6–1.3)
CREAT SERPL-MCNC: 1.63 MG/DL (ref 0.6–1.3)
EOSINOPHIL # BLD AUTO: 0.13 THOUSAND/ÂΜL (ref 0–0.61)
EOSINOPHIL NFR BLD AUTO: 2 % (ref 0–6)
ERYTHROCYTE [DISTWIDTH] IN BLOOD BY AUTOMATED COUNT: 16.4 % (ref 11.6–15.1)
GFR SERPL CREATININE-BSD FRML MDRD: 30 ML/MIN/1.73SQ M
GFR SERPL CREATININE-BSD FRML MDRD: 33 ML/MIN/1.73SQ M
GLUCOSE SERPL-MCNC: 104 MG/DL (ref 65–140)
GLUCOSE SERPL-MCNC: 98 MG/DL (ref 65–140)
HCT VFR BLD AUTO: 33.1 % (ref 34.8–46.1)
HGB BLD-MCNC: 10.1 G/DL (ref 11.5–15.4)
IMM GRANULOCYTES # BLD AUTO: 0.02 THOUSAND/UL (ref 0–0.2)
IMM GRANULOCYTES NFR BLD AUTO: 0 % (ref 0–2)
LYMPHOCYTES # BLD AUTO: 1.08 THOUSANDS/ÂΜL (ref 0.6–4.47)
LYMPHOCYTES NFR BLD AUTO: 17 % (ref 14–44)
MCH RBC QN AUTO: 30 PG (ref 26.8–34.3)
MCHC RBC AUTO-ENTMCNC: 30.5 G/DL (ref 31.4–37.4)
MCV RBC AUTO: 98 FL (ref 82–98)
MONOCYTES # BLD AUTO: 0.69 THOUSAND/ÂΜL (ref 0.17–1.22)
MONOCYTES NFR BLD AUTO: 11 % (ref 4–12)
NEUTROPHILS # BLD AUTO: 4.34 THOUSANDS/ÂΜL (ref 1.85–7.62)
NEUTS SEG NFR BLD AUTO: 69 % (ref 43–75)
NRBC BLD AUTO-RTO: 0 /100 WBCS
P AXIS: 70 DEGREES
PLATELET # BLD AUTO: 145 THOUSANDS/UL (ref 149–390)
PLATELET # BLD AUTO: 149 THOUSANDS/UL (ref 149–390)
PMV BLD AUTO: 10.8 FL (ref 8.9–12.7)
PMV BLD AUTO: 10.8 FL (ref 8.9–12.7)
POTASSIUM SERPL-SCNC: 4.1 MMOL/L (ref 3.5–5.3)
POTASSIUM SERPL-SCNC: 4.5 MMOL/L (ref 3.5–5.3)
PR INTERVAL: 154 MS
PROT SERPL-MCNC: 7.2 G/DL (ref 6.4–8.4)
QRS AXIS: 60 DEGREES
QRSD INTERVAL: 80 MS
QT INTERVAL: 438 MS
QTC INTERVAL: 473 MS
RBC # BLD AUTO: 3.37 MILLION/UL (ref 3.81–5.12)
RH BLD: NEGATIVE
RH BLD: NEGATIVE
SODIUM SERPL-SCNC: 136 MMOL/L (ref 135–147)
SODIUM SERPL-SCNC: 137 MMOL/L (ref 135–147)
SPECIMEN EXPIRATION DATE: NORMAL
T WAVE AXIS: 23 DEGREES
VENTRICULAR RATE: 70 BPM
WBC # BLD AUTO: 6.31 THOUSAND/UL (ref 4.31–10.16)

## 2023-03-01 RX ORDER — METOPROLOL TARTRATE 50 MG/1
50 TABLET, FILM COATED ORAL EVERY 12 HOURS SCHEDULED
COMMUNITY

## 2023-03-01 RX ORDER — DOCUSATE SODIUM 100 MG/1
100 CAPSULE, LIQUID FILLED ORAL 2 TIMES DAILY
Status: DISCONTINUED | OUTPATIENT
Start: 2023-03-01 | End: 2023-03-01 | Stop reason: HOSPADM

## 2023-03-01 RX ORDER — DONEPEZIL HYDROCHLORIDE 5 MG/1
5 TABLET, FILM COATED ORAL
Status: DISCONTINUED | OUTPATIENT
Start: 2023-03-01 | End: 2023-03-01 | Stop reason: HOSPADM

## 2023-03-01 RX ORDER — WATER 1000 ML/1000ML
INJECTION, SOLUTION INTRAVENOUS
Status: COMPLETED
Start: 2023-03-01 | End: 2023-03-01

## 2023-03-01 RX ORDER — MEMANTINE HYDROCHLORIDE 5 MG/1
5 TABLET ORAL DAILY
Status: DISCONTINUED | OUTPATIENT
Start: 2023-03-01 | End: 2023-03-01

## 2023-03-01 RX ORDER — DONEPEZIL HYDROCHLORIDE 5 MG/1
5 TABLET, FILM COATED ORAL
COMMUNITY

## 2023-03-01 RX ORDER — POLYETHYLENE GLYCOL 3350 17 G/17G
17 POWDER, FOR SOLUTION ORAL DAILY PRN
COMMUNITY

## 2023-03-01 RX ORDER — HEPARIN SODIUM 5000 [USP'U]/ML
5000 INJECTION, SOLUTION INTRAVENOUS; SUBCUTANEOUS EVERY 8 HOURS SCHEDULED
Status: DISCONTINUED | OUTPATIENT
Start: 2023-03-01 | End: 2023-03-01 | Stop reason: HOSPADM

## 2023-03-01 RX ORDER — ACETAMINOPHEN 325 MG/1
650 TABLET ORAL EVERY 6 HOURS PRN
COMMUNITY

## 2023-03-01 RX ORDER — OLANZAPINE 10 MG/1
2.5 INJECTION, POWDER, LYOPHILIZED, FOR SOLUTION INTRAMUSCULAR ONCE
Status: COMPLETED | OUTPATIENT
Start: 2023-03-01 | End: 2023-03-01

## 2023-03-01 RX ORDER — HEPARIN SODIUM 5000 [USP'U]/ML
5000 INJECTION, SOLUTION INTRAVENOUS; SUBCUTANEOUS EVERY 8 HOURS SCHEDULED
Status: DISCONTINUED | OUTPATIENT
Start: 2023-03-01 | End: 2023-03-01

## 2023-03-01 RX ORDER — QUETIAPINE FUMARATE 25 MG/1
25 TABLET, FILM COATED ORAL 2 TIMES DAILY
Status: DISCONTINUED | OUTPATIENT
Start: 2023-03-01 | End: 2023-03-01

## 2023-03-01 RX ORDER — METOPROLOL TARTRATE 50 MG/1
50 TABLET, FILM COATED ORAL EVERY 12 HOURS SCHEDULED
Status: DISCONTINUED | OUTPATIENT
Start: 2023-03-01 | End: 2023-03-01 | Stop reason: HOSPADM

## 2023-03-01 RX ORDER — POLYETHYLENE GLYCOL 3350 17 G/17G
17 POWDER, FOR SOLUTION ORAL DAILY
Status: DISCONTINUED | OUTPATIENT
Start: 2023-03-01 | End: 2023-03-01 | Stop reason: HOSPADM

## 2023-03-01 RX ORDER — MEMANTINE HYDROCHLORIDE 5 MG/1
5 TABLET ORAL DAILY
COMMUNITY
End: 2023-03-01

## 2023-03-01 RX ORDER — IPRATROPIUM BROMIDE 21 UG/1
2 SPRAY, METERED NASAL 2 TIMES DAILY PRN
COMMUNITY

## 2023-03-01 RX ORDER — SENNA PLUS 8.6 MG/1
2 TABLET ORAL
COMMUNITY

## 2023-03-01 RX ORDER — DOCUSATE SODIUM 100 MG/1
100 CAPSULE, LIQUID FILLED ORAL 2 TIMES DAILY
COMMUNITY

## 2023-03-01 RX ORDER — BUSPIRONE HYDROCHLORIDE 5 MG/1
5 TABLET ORAL DAILY
Status: DISCONTINUED | OUTPATIENT
Start: 2023-03-01 | End: 2023-03-01 | Stop reason: HOSPADM

## 2023-03-01 RX ORDER — QUETIAPINE FUMARATE 25 MG/1
25 TABLET, FILM COATED ORAL
Qty: 10 TABLET | Refills: 0 | Status: SHIPPED | OUTPATIENT
Start: 2023-03-01

## 2023-03-01 RX ORDER — QUETIAPINE FUMARATE 25 MG/1
25 TABLET, FILM COATED ORAL 2 TIMES DAILY
Status: ON HOLD | COMMUNITY
End: 2023-03-01 | Stop reason: SDUPTHER

## 2023-03-01 RX ORDER — SODIUM CHLORIDE, SODIUM GLUCONATE, SODIUM ACETATE, POTASSIUM CHLORIDE, MAGNESIUM CHLORIDE, SODIUM PHOSPHATE, DIBASIC, AND POTASSIUM PHOSPHATE .53; .5; .37; .037; .03; .012; .00082 G/100ML; G/100ML; G/100ML; G/100ML; G/100ML; G/100ML; G/100ML
75 INJECTION, SOLUTION INTRAVENOUS CONTINUOUS
Status: DISCONTINUED | OUTPATIENT
Start: 2023-03-01 | End: 2023-03-01

## 2023-03-01 RX ORDER — HEPARIN SODIUM 5000 [USP'U]/ML
INJECTION, SOLUTION INTRAVENOUS; SUBCUTANEOUS
Status: DISPENSED
Start: 2023-03-01 | End: 2023-03-01

## 2023-03-01 RX ORDER — QUETIAPINE FUMARATE 25 MG/1
25 TABLET, FILM COATED ORAL
Status: DISCONTINUED | OUTPATIENT
Start: 2023-03-01 | End: 2023-03-01 | Stop reason: HOSPADM

## 2023-03-01 RX ORDER — BUSPIRONE HYDROCHLORIDE 5 MG/1
5 TABLET ORAL 2 TIMES DAILY
COMMUNITY

## 2023-03-01 RX ORDER — LANOLIN ALCOHOL/MO/W.PET/CERES
6 CREAM (GRAM) TOPICAL
COMMUNITY

## 2023-03-01 RX ORDER — ASPIRIN 81 MG/1
81 TABLET, CHEWABLE ORAL DAILY
COMMUNITY

## 2023-03-01 RX ORDER — BUMETANIDE 1 MG/1
1 TABLET ORAL DAILY
COMMUNITY

## 2023-03-01 RX ORDER — DIAPER,BRIEF,INFANT-TODD,DISP
1 EACH MISCELLANEOUS EVERY 6 HOURS PRN
COMMUNITY

## 2023-03-01 RX ADMIN — OLANZAPINE 2.5 MG: 10 INJECTION, POWDER, LYOPHILIZED, FOR SOLUTION INTRAMUSCULAR at 07:16

## 2023-03-01 RX ADMIN — HEPARIN SODIUM 5000 UNITS: 5000 INJECTION INTRAVENOUS; SUBCUTANEOUS at 13:42

## 2023-03-01 RX ADMIN — WATER: 1 INJECTION INTRAMUSCULAR; INTRAVENOUS; SUBCUTANEOUS at 07:15

## 2023-03-01 RX ADMIN — LIDOCAINE HYDROCHLORIDE 10 ML: 10 INJECTION, SOLUTION EPIDURAL; INFILTRATION; INTRACAUDAL; PERINEURAL at 00:29

## 2023-03-01 RX ADMIN — TETANUS TOXOID, REDUCED DIPHTHERIA TOXOID AND ACELLULAR PERTUSSIS VACCINE, ADSORBED 0.5 ML: 5; 2.5; 8; 8; 2.5 SUSPENSION INTRAMUSCULAR at 00:30

## 2023-03-01 RX ADMIN — OLANZAPINE 2.5 MG: 10 INJECTION, POWDER, FOR SOLUTION INTRAMUSCULAR at 07:21

## 2023-03-01 NOTE — PROGRESS NOTES
Neurosurgery brief update note    Discussed case with Trauma attending Dr Luis Orellana  She has baseline dementia, and the rigid cervical collar appears to be exacerbating extreme agitation  Given the mild degree of spinal fracture (teardrop fracture of anterior inferior endplate of C5, at the site of chronic anterior osteophyte, without significant displacement) not near spinal canal, I am okay with refraining from placing rigid cervical collar at this time       [id number] [facility]

## 2023-03-01 NOTE — CONSULTS
Consultation - Geriatric Medicine   Fox Argueta 68 y o  female MRN: [id number]  Unit/Bed#: S -01 Encounter: [id number]        Inpatient consult to Gerontology  Consult performed by: April Carver MD  Consult ordered by: Chau Latif DO            Assessment/Plan   1 -Closed nondisplaced fracture of the fifth cervical vertebra with routine healing -Patient has been evaluated by neurosurgery there are no plans for surgery at this juncture there will maintain conservative treatment at present  Patient has been placed on an [employer] collar at all times and a serial collar for showering  Patient is to wear the collar for 6 to 12 weeks and will be followed up by neurosurgery  2 -Chronic renal insufficiency stage IIIb  -Current creatinine 1 48 GFR of 33     3 -  Infrarenal abdominal aortic aneurysm -Currently measuring 4 9 cm it has increased by 0 8 cm since November 12, 2020  Spoke with daughter she would feel comfortable with having vascular surgery assess patient and render their opinion in regards to the options she has  4 -  Dementia -Suspect underlying Alzheimer's  Patient had a fall with a head strike on oral anticoagulants  CT scan revealed decreased attenuation in the periventricular and subcortical white matter likely representing mild microangiopathic changes  Would discontinue patient's Aricept and memantine given her degree of cognitive decline  Patient is at high risk for delirium with placed on delirium precautions    Delirium precautions  -Patient is high risk of delirium due to Dementia  -Initiate delirium precautions  -maintain normal sleep/wake cycle  -minimize overnight interruptions, group overnight vitals/labs/nursing checks as possible  -dim lights, close blinds and turn off tv to minimize stimulation and encourage sleep environment in evenings  -ensure that pain is well controlled  consider Tylenol 975mg Q8H scheduled if not already ordered   -monitor for fecal and urinary retention which may precipitate delirium  -encourage early mobilization and ambulation  -provide frequent reorientation and redirection  -encourage family and friends at the bedside to help help calm patient if anxious  -avoid medications which may precipitate or worsen delirium such as tramadol, benzodiazepine, anticholinergics, and benadryl  -encourage hydration and nutrition   -redirect unwanted behaviors as first line, avoid physical restraints, use chemical restraint only if all other attempts have been unsuccessful, would consider seroquel 25mg, monitor for orthostatic hypotension and QTc prolongation with repeat dosing, recommend lowest dose possible for shortest duration possible         5 -  Paroxysmal atrial fibrillation -  Patient has been on apixaban 5 mg orally twice daily  Current EKG reveals normal sinus rhythm  6 -  Frequent falls  -  Patient on a number of medications that could precipitate her falls I see that she was placed on sertraline here according to daughter she was not taking this medication  We will discontinue  Also would modify quetiapine and just give her 25 mg at bedtime  We will also discontinue Namenda at this time  Also check for orthostasis making sure the patient is not becoming orthostatic when she changes positions      7 -  Medication reconciliation  acetaminophen (TYLENOL) 650 mg, Oral, Every 6 hours PRN   APIXABAN PO 5 mg, Oral, 2 times daily   aspirin 81 mg, Oral, Daily   bumetanide (BUMEX) 1 mg, Oral, Daily   Donazepil 5mg hs   docusate sodium (COLACE) 100 mg, Oral, 2 times daily   Metoprolol tartrate 50mg bid   hydrocortisone 1 % cream 1 application, Topical, Every 6 hours PRN   ipratropium (ATROVENT) 0 03 % nasal spray 2 sprays, Nasal, 2 times daily PRN   melatonin 6 mg, Oral, Daily at bedtime PRN   Bus pirone 5mg       polyethylene glycol (MIRALAX) 17 g, Oral, Daily PRN   Quetiapine 25 mg, Oral, 2 times daily   senna (SENOKOT) 8 6 MG tablet 2 tablets, Oral, Daily at bedtime       sodium chloride (OCEAN) 0 65 % nasal spray 1 spray, Nasal, Every 4 hours PRN       8 -  End-of-life issues -I did discuss with daughter who is her power of  about end-of-life issues  At present she is a full code  I left it as such patient's daughter will let attending know if she changes her mind  History of Present Illness   Physician Requesting Consult: Aston Berger DO  Reason for Consult / Principal Problem: Dementia-falls  Hx and PE limited by: No limitations  Additional history obtained from: Daughter May Rubin is able to give an excellent update on her mom her phone #[phone]      HPI: Chaparro Oviedo is a 68y o  year old female who presents to [address] [place] [address] emergency room as a trauma B patient after having a fall out of bed and striking her face  Patient was found by workers at the facility and referred to the hospital because of the fall and the fact that she was complaining of proximal left upper arm and left hip pain  The patient was noted to have a 3 cm laceration to her nasal bridge and a 0 5 cm laceration to her forehead  Patient was noted to have increased tenderness to palpation over her left hip  Initial laboratory data revealed normal electrolytes with a BUN of 27 and a creatinine 1 48 and a GFR of 33 consistent with chronic renal insufficiency stage IIIb  Patient also noted to be anemic with a hemoglobin of 10 1 and hematocrit 33 1 with normochromic normocytic indices  Imaging studies revealed no acute cardiopulmonary disease within limitations of supine imaging also no traumatic abnormality noted of the pelvis  CAT scan of the head revealed no acute intracranial abnormality  CT of the cervical spine revealed an anterior-inferior C5 vertebral body fracture    CT of the chest and abdomen revealed an infrarenal abdominal aortic aneurysm that measures as large as 4 9 cm this measured 4 1 cm and a prior CT November 12, 2020 there was evidence of circumferential mural thrombus noted  X-ray left humerus revealed no acute fracture or dislocation no acute osseous abnormality noted  Patient apparently lives in a dementia unit at [facility] since October  Patient's other medical concerns include a history of paroxysmal atrial fibrillation, mitral valve repair regurgitation  Review of Systems   Constitutional: Negative  HENT: Positive for facial swelling  Eyes: Positive for visual disturbance  Respiratory: Negative  Cardiovascular: Negative  Gastrointestinal: Negative  Endocrine: Negative  Genitourinary: Negative  Musculoskeletal: Positive for arthralgias  Negative for gait problem  Psychiatric/Behavioral: Positive for behavioral problems, confusion and decreased concentration  Memory/Cognitive screening: Patient with history of cognitive decline over the past year  This deteriorated rapidly [facility] October dementia unit at [facility]  Mobility: Patient is able to ambulate did not need any assistive devices  Falls: Cisneros with approximately 3 falls in the last 4-1/2 months    Assistive Devices: Not use walker or cane  Fraility: Creased frailty level 7  Nutrition/weight loss/grocery shopping/meal preparation: Nutrition is fair  Vision impairment: Decreased vision patient has cataracts that need to be taken care of  Hearing impairment: No hearing impairment  Incontinence: Intermittent incontinence when she was at home  Delirium: Patient has had episodes of delirium in the past  Polypharmacy:   Current Outpatient Medications   Medication Instructions   • acetaminophen (TYLENOL) 650 mg, Oral, Every 6 hours PRN   • APIXABAN PO 5 mg, Oral, 2 times daily   • aspirin 81 mg, Oral, Daily   • bumetanide (BUMEX) 1 mg, Oral, Daily   • Donazepil 5mg hs   • docusate sodium (COLACE) 100 mg, Oral, 2 times daily   • Metoprolol tartrate 50mg bid   • hydrocortisone 1 % cream 1 application, Topical, Every 6 hours PRN   • ipratropium (ATROVENT) 0 03 % nasal spray 2 sprays, Nasal, 2 times daily PRN   • melatonin 6 mg, Oral, Daily at bedtime PRN   • Bus pirone 5mg   •     • polyethylene glycol (MIRALAX) 17 g, Oral, Daily PRN   • Quetiapine 25 mg, Oral, 2 times daily   • senna (SENOKOT) 8 6 MG tablet 2 tablets, Oral, Daily at bedtime   •     • sodium chloride (OCEAN) 0 65 % nasal spray 1 spray, Nasal, Every 4 hours PRN     Patients primary residence: Patient's primary residence is [facility] the cognitively impaired unit lives with: At nursing facility  iADL's: Patient does not enjoy her instrumental actives of daily living  ADL's: Patient needs assistance with her basic activities of daily living    Historical Information   Past medical history: No past medical history on file  Past medical history and old charts revealed evidence of a history of a left ovarian cyst, COPD, vasomotor rhinitis, hypertension, coronary artery disease involving native coronary artery, abdominal aortic aneurysm without rupture, history of congestive heart failure, paroxysmal atrial fibrillation, mitral valve regurgitation due to 2 and not concurrent with acute myocardial infarction  Dementia without behavioral disturbance, arthritis, chronic kidney disease stage IV, asymptomatic microscopic hematuria  History of markedly dilated left atria    Past surgical history: no previous urinary  Social history:  Social History     Socioeconomic History   • Marital status: Unknown     Spouse name: Not on file   • Number of children: Not on file   • Years of education: Not on file   • Highest education level: Not on file   Occupational History   • Not on file   Tobacco Use   • Smoking status: Unknown   • Smokeless tobacco: Not on file   Substance and Sexual Activity   • Alcohol use: Not Currently     Comment: unknown pt confused   • Drug use: Not on file   • Sexual activity: Not on file   Other Topics Concern   • Not on file   Social History Narrative   • Not on file     Social Determinants of Health     Financial Resource Strain: Not on file   Food Insecurity: Not on file   Transportation Needs: Not on file   Physical Activity: Not on file   Stress: Not on file   Social Connections: Not on file   Intimate Partner Violence: Not on file   Housing Stability: Not on file     Family history: No family history on file  Meds/Allergies   All current active meds have been reviewed  Allergies   Allergen Reactions   • Bee Pollen Other (See Comments)     Unknown reaction   • Pollen Extract Other (See Comments)     Unknown reaction       Objective   Vitals:    03/01/23 0030   BP: 112/56   Pulse: 63   Resp: 18   Temp:    SpO2: 94%     No intake or output data in the 24 hours ending 03/01/23 1117  Invasive Devices     Peripheral Intravenous Line  Duration           Peripheral IV 02/28/23 Left Antecubital <1 day                Physical Exam  Vitals (Patient currently restrained) and nursing note reviewed  Constitutional:       Appearance: She is ill-appearing  HENT:      Head: Normocephalic and atraumatic  Right Ear: Ear canal and external ear normal       Left Ear: Ear canal and external ear normal       Nose:      Comments: Increased edema along nasal bridge  Mouth/Throat:      Mouth: Mucous membranes are dry  Eyes:      Comments: Patient with evidence of orbital swelling she is able to open her left eye  Cardiovascular:      Rate and Rhythm: Normal rate and regular rhythm  Pulses: Normal pulses  Heart sounds: Normal heart sounds  Pulmonary:      Effort: Pulmonary effort is normal       Breath sounds: Normal breath sounds  Abdominal:      General: Bowel sounds are normal       Palpations: Abdomen is soft  Musculoskeletal:      Cervical back: Neck supple  Neurological:      Mental Status: She is disoriented  Lab Results:   I have personally reviewed pertinent lab and imaging results       VTE Prophylaxis: Heparin 5000 international units subcu every 8 hours    Code Status: Level 1 - Full Code  Advance Directive and Living Will:      Power of :    POLST:      Family and Social Support: Patient has a very supportive daughter  Living Arrangements: Other (Comment) (At facility)  Support Systems: Home care staff  Assistance Needed: unknown @ this time  Type of Current Residence: Nursing home  [address] Name: [facility]  Current Home Care Services: No

## 2023-03-01 NOTE — ASSESSMENT & PLAN NOTE
- confused at baseline  - GCS 14 upon admission (E4 V4 M6)  - continue at-home seroquel 25 mg BID  - continue at-home zoloft 50 mg daily  - continue at-home donezepil 5 mg daily  - continue at-home memantine 5 mg daily  - continue at-home buspirone 5 mg daily  - required restraints and IM zyprexa earlier today

## 2023-03-01 NOTE — ASSESSMENT & PLAN NOTE
Lab Results   Component Value Date    EGFR 30 02/28/2023    CREATININE 1 63 (H) 02/28/2023     - hx CKD4  - avoid nephrotoxic agents  - avoid overdiuresis/hypovolemia/hypotension  - trend BMP

## 2023-03-01 NOTE — PROCEDURES
Laceration repair    Date/Time: 3/1/2023 12:30 AM  Performed by: Nadege Hanson DO  Authorized by: Nadege Hanson DO   Consent: Verbal consent obtained    Consent given by: patient  Body area: head/neck  Location details: nose  Laceration length: 3 cm  Anesthesia: local infiltration    Anesthesia:  Local Anesthetic: lidocaine 1% without epinephrine  Anesthetic total: 1 mL    Wound Dehiscence:  Superficial Wound Dehiscence: simple closure      Procedure Details:  Irrigation solution: saline  Irrigation method: syringe  Amount of cleaning: extensive  Skin closure: 4-0 Prolene  Number of sutures: 5  Approximation difficulty: simple  Patient tolerance: patient tolerated the procedure well with no immediate complications

## 2023-03-01 NOTE — ASSESSMENT & PLAN NOTE
- hx CHF w/ ECHO 09/2/21 demonstrating Left ventricular cavity size is normal  Wall thickness is mildly increased  The left ventricular ejection fraction is 50% by visual estimation   Systolic function is low normal    - pt has taken bumetanide 1 mg daily in the past, resume on discharge  - f/u with PCP

## 2023-03-01 NOTE — INCIDENTAL FINDINGS
The following findings require follow up:  Radiographic finding   Findin 9 cm abdominal aortic aneurysm, reportedly measured 4 1 cm in    Follow up required: family doctor, vascular surgery   Follow up should be done within 2 week(s)    I informed patient's daughter, Tay Winchester of incidental finding and recommended follow up  She verbalized understanding and was appreciative of the update

## 2023-03-01 NOTE — CASE MANAGEMENT
Case Management Discharge Planning Note    Patient name Mary Davey  Location S /S -01 MRN [id number]  : 1945 Date 3/1/2023       Current Admission Date: 2023  Current Admission Diagnosis:CHF (congestive heart failure) [facility])   Patient Active Problem List    Diagnosis Date Noted   • [facility]congestive heart failure) ([facility] ) 2023   • HTN (hypertension) 2023   • Dementia ([facility] ) 2023   • Chronic kidney disease (CKD), stage IV (severe) ([facility] ) 2023   • Closed nondisplaced fracture of fifth cervical vertebra with routine healing 2023   • Fall 2023      LOS (days): 0  Geometric Mean LOS (GMLOS) (days):   Days to GMLOS:     OBJECTIVE:            Current admission status: Observation   Preferred Pharmacy:   UNKNOWN - FOLLOW UP PRIOR TO DISCHARGE TO E-PRESCRIBE  No address on file      Primary Care Provider: Anny Amaro MD    Primary Insurance: MEDICARE  Secondary Insurance: COMMERCIAL MISCELLANEOUS    DISCHARGE DETAILS:    Discharge planning discussed with[de-identified] Daughter, Selestino Eisenmenger of Choice: Yes  Comments - Freedom of Choice: CM discussed discharge plans and as per daughter, patient is LTC at [facility] and plan is to return to facility at discharge  CM sent referral to [facility] and confirmed she can return    CM contacted family/caregiver?: Yes  Were Treatment Team discharge recommendations reviewed with patient/caregiver?: Yes  Did patient/caregiver verbalize understanding of patient care needs?: N/A- going to facility  Were patient/caregiver advised of the risks associated with not following Treatment Team discharge recommendations?: Yes    Contacts  Patient Contacts: Tay Quinonez DTR  Relationship to Patient[de-identified] Family  Contact Method: Phone  Phone Number: See face Sheet  Reason/Outcome: Discharge [address]         Is the patient interested in [address] at discharge?: No    DME Referral Provided  Referral made for DME?: No    Other Referral/Resources/Interventions Provided:  Interventions: SNF, Facility Return  Referral Comments: CM discussed discharge plans and as per daughter, patient is LTC at [facility] and plan is to return to facility at discharge  CM sent referral to [facility] and confirmed she can return      Would you like to participate in our [address] service program?  : No - Declined    Treatment Team Recommendation: Facility Return, SNF  Discharge Destination Plan[de-identified] SNF  Transport at Discharge : [facility]  Dispatcher Contacted: Yes  Number/Name of Dispatcher: [employer]     ETA of Transport (Date): 03/01/23  ETA of Transport (Time): 1800        Accompanied by: EMS personnel        Accepting Facility Name, [address] : [facility] (formerly [facility])  Receiving Facility/Agency Phone Number: [phone]  Facility/Agency Fax Number: [phone]

## 2023-03-01 NOTE — PHYSICAL THERAPY NOTE
PHYSICAL THERAPY EVALUATION NOTE    Patient Name: Mani Atkins  [id number] Date: 3/1/2023    AGE:   68 y o  Mrn:   [id number]  ADMIT DX:  Nose injury [S09 92XA]  Closed nondisplaced fracture of fifth cervical vertebra, unspecified fracture morphology, initial encounter ([facility] ) [S12 401A]    No past medical history on file  Length Of Stay: 0  PHYSICAL THERAPY EVALUATION :   Patient's identity confirmed via 2 patient identifiers (full name and ) at start of session     23 0840   PT Last Visit   PT Visit Date 23   Note Type   Note type Evaluation;Orthotic Management/Training   Type of Brace   Brace Applied [employer] Set   Patient Position When Brace Applied Supine   Bracing Recommendations   (At all times)   Education   Education Provided Yes   End of Consult   Nurse Communication (S)  Nurse aware of consult, application of brace  (Pt actively resisting, thrashing head in all directions and tucking neck  Attempted to f/u to re-adjust later (pt appparently pleasant w/ SLP) and pt immediately begins thrashing, trying to kick therapist, states "i will put a shotgun through your head")   Pain Assessment   Pain Score No Pain   Restrictions/Precautions   Weight Bearing Precautions Per Order No   Braces or Orthoses C/S Collar  (Collar initially fit, now poorly fitting w/ chin not supported as she continues to move head around in collar  Trauma AP Peggy aware )   Other Precautions Combative;Agitated;Cognitive; Bed Alarm; Restraints; Fall Risk   Home Living   Type of Home SNF  (LTC @ [address] [place] [address])   Home Layout One level   Home Equipment Walker   Additional Comments Pt admit from [address] [place] [address] where she's been since 10/2022   Prior [address] staff   Falls in the last 6 months 1 to 4  (at least 2 per chart review)   General   Family/Caregiver Present No   Cognition   Overall Cognitive Status Impaired   Arousal/Participation Alert   Orientation Level Oriented to person;Disoriented to place; Disoriented to time;Disoriented to situation   Memory Unable to assess   Following Commands Follows one step commands inconsistently   Comments Pt very agitated, screaming and yelling at therapist    Subjective   Subjective "I'm gonna tell my parents what you're doing"   RLE Assessment   RLE Assessment WFL   Strength RLE   RLE Overall Strength 3+/5  (functionally observed,)   LLE Assessment   LLE Assessment WFL   Strength LLE   LLE Overall Strength 3+/5  (functionally observed)   Bed Mobility   Supine to Sit 5  Supervision   Additional items   (to long sit)   Sit to Supine 5  Supervision   Additional Comments Pt is able to come to a long sitting position from flat supine with supervision  deferred OOB due to patient/therapist safety as pt is actively attempting to kick therapist and threatens to inflict harm, very difficult to redirect   Transfers   Sit to Stand Unable to assess   Balance   Static Sitting Fair +   Activity Tolerance   Activity Tolerance Patient limited by fatigue   Medical Staff [place] coordination w/ OT Carmen; Spoke to Trauma  Av  Maia Guy to RN Peggy   Assessment   Problem List Decreased strength;Decreased endurance; Impaired balance;Decreased mobility; Decreased cognition; Impaired judgement;Decreased safety awareness;Pain   Assessment Royce Dandy is a 68 y o  Female who presents [facility] on 2/28/2023 from [address] [place] [address] w/ c/o found down on floor and diagnosis of closed nondisplaced fracture of C5  Orders for PT eval and treat received  Pt presents w/ comorbidities of dementia, COPD, Afib  At baseline, pt mobilizes w/ RW and reports + falls in the last 6 months  Upon evaluation, pt presents w/ the following deficits: weakness, impaired balance, decreased endurance and impaired cognition, agitation  Upon eval, pt requires S for bed mobility   Given the above findings, discharge recommendation is for Return to facility w/ skilled PT needs  Pt would greatly benefit from return to her own familiar environment  During this admission, pt would benefit from continued skilled inpatient PT in the acute care setting in order to address the abovementioned deficits to maximize function and mobility before DC from acute care  Goals   Patient Goals pt unable to express   STG Expiration Date 03/11/23   Short Term Goal #1 Patient will: Perform all bed mobility tasks modified independent to improve pt's independence w/ repositioning for decrease risk of skin breakdown, Perform all transfers w/ minx1 consistently from various height surfaces in order to improve I w/ engagement w/ real-world environments/situations, Ambulate at least 50 ft  with roller walker w/ minx1 w/o LOB to facilitate return and engagement w/ previous living environment, Increase all balance 1/2 grade to decrease risk for falls and Complete exercise program independently   Plan   Treatment/Interventions Functional transfer training;LE strengthening/ROM; Therapeutic exercise; Endurance training;Cognitive reorientation;Patient/family training;Equipment eval/education; Bed mobility;Gait training   PT Frequency 1-2x/wk   Recommendation   PT Discharge Recommendation Return to facility with rehabilitation services   Equipment Recommended Walker   AM-PAC Basic Mobility Inpatient   Turning in Flat Bed Without Bedrails 3   Lying on Back to Sitting on Edge of Flat Bed Without Bedrails 3   Moving Bed to Chair 1   Standing Up From Chair Using Arms 1   Walk in Room 1   Climb 3-5 Stairs With Railing 1   Basic Mobility Inpatient Raw Score 10   Turning Head Towards Sound 3   Follow Simple Instructions 2   Low Function Basic Mobility Raw Score 15   Low Function Basic Mobility Standardized Score 23 9   Highest Level Of Mobility   JH-HL Goal 4: Move to chair/commode   JH-HLM Achieved 2: Bed activities/Dependent transfer   End of Consult   Patient Position at End of Consult Supine;Bed/Chair alarm activated; All needs within reach  (BUE restraints intact)       Pt would benefit from skilled inpatient PT during this admission in order to facilitate progress towards goals to maximize functional independence    Yary Olivas, PT, DPT

## 2023-03-01 NOTE — ASSESSMENT & PLAN NOTE
- noted to have anterior-inferior C5 vertebral body fracture on CT cervical spine 03/01  - NSGY consulted, recs appreciate  - No cervical collar required  - Neuro intact  - f/u with neurosurgery in 2 weeks

## 2023-03-01 NOTE — PLAN OF CARE
Problem: PHYSICAL THERAPY ADULT  Goal: Performs mobility at highest level of function for planned discharge setting  See evaluation for individualized goals  Description: Treatment/Interventions: Functional transfer training, LE strengthening/ROM, Therapeutic exercise, Endurance training, Cognitive reorientation, Patient/family training, Equipment eval/education, Bed mobility, Gait training  Equipment Recommended: Manjitnaty Montoya       See flowsheet documentation for full assessment, interventions and recommendations  3/1/2023 1500 by Kaitlin Avitia PT  Note:    Problem List: Decreased strength, Decreased endurance, Impaired balance, Decreased mobility, Decreased cognition, Impaired judgement, Decreased safety awareness, Pain  Assessment: Mervyn Osler is a 68 y o  Female who presents [facility] on 2/28/2023 from [address] [place] [address] w/ c/o found down on floor and diagnosis of closed nondisplaced fracture of C5  Orders for PT eval and treat received  Pt presents w/ comorbidities of dementia, COPD, Afib  At baseline, pt mobilizes w/ RW and reports + falls in the last 6 months  Upon evaluation, pt presents w/ the following deficits: weakness, impaired balance, decreased endurance and impaired cognition, agitation  Upon eval, pt requires S for bed mobility  Given the above findings, discharge recommendation is for Return to facility w/ skilled PT needs  Pt would greatly benefit from return to her own familiar environment  During this admission, pt would benefit from continued skilled inpatient PT in the acute care setting in order to address the abovementioned deficits to maximize function and mobility before DC from acute care  PT Discharge Recommendation: Return to facility with rehabilitation services    See flowsheet documentation for full assessment

## 2023-03-01 NOTE — ASSESSMENT & PLAN NOTE
- confused at baseline  - GCS 14 upon admission (E4 V4 M6)  - continue at-home seroquel 25 mg BID  - continue at-home zoloft 50 mg daily  - continue at-home donezepil 5 mg daily  - continue at-home memantine 5 mg daily  - continue at-home buspirone 5 mg daily

## 2023-03-01 NOTE — ASSESSMENT & PLAN NOTE
- sustained below stated injuries  - no cervical collar required  - patient's facility is accepting her to return  - Discharge patient to facility

## 2023-03-01 NOTE — DISCHARGE SUMMARY
[facility]  Discharge- Jomar Castano 1945, 68 y o  female MRN: [id number]  Unit/Bed#: S -01 Encounter: [id number]  Primary Care Provider: Charles Ordonez MD   Date and time admitted to hospital: 2/28/2023 11:37 PM    [address]  - sustained below stated injuries  - no cervical collar required  - patient's facility is accepting her to return  - Discharge patient to facility    Closed nondisplaced fracture of fifth cervical vertebra with routine healing  Assessment & Plan  - noted to have anterior-inferior C5 vertebral body fracture on CT cervical spine 03/01  - NSGY consulted, recs appreciate  - No cervical collar required  - Neuro intact  - f/u with neurosurgery in 2 weeks    Chronic kidney disease (CKD), stage IV (severe) [facility])  Assessment & Plan  Lab Results   Component Value Date    EGFR 33 03/01/2023    EGFR 30 02/28/2023    CREATININE 1 48 (H) 03/01/2023    CREATININE 1 63 (H) 02/28/2023     - hx CKD4  - Baseline Cr 1 5-2 0  Improved to 1 4    - avoid nephrotoxic agents  - f/u with PCP     Dementia ([facility] [address] )  Assessment & Plan  - confused at baseline  - GCS 14 upon admission (E4 V4 M6)  - continue at-home seroquel 25 mg BID  - continue at-home zoloft 50 mg daily  - continue at-home donezepil 5 mg daily  - continue at-home memantine 5 mg daily  - continue at-home buspirone 5 mg daily  - required restraints and IM zyprexa earlier today      HTN (hypertension)  Assessment & Plan  - hx CHF w/ ECHO 09/2/21 demonstrating Left ventricular cavity size is normal  Wall thickness is mildly increased  The left ventricular ejection fraction is 50% by visual estimation   Systolic function is low normal    - pt has taken bumetanide 1 mg daily in the past, resume on discharge  - f/u with PCP        CHF (congestive heart failure) ([facility])  Assessment & Plan  Wt Readings from Last 3 Encounters:   02/28/23 85 6 kg (188 lb 11 4 oz)     - hx CHF w/ ECHO 09/2/21 demonstrating Left ventricular cavity size is normal  Wall thickness is mildly increased  The left ventricular ejection fraction is 50% by visual estimation  Systolic function is low normal    - Resume home medications on discharge  - monitor daily weights  - Diuresis as needed                      Medical Problems     Resolved Problems  Date Reviewed: 3/1/2023   None         Admission Date:   Admission Orders (From admission, onward)     Ordered        03/01/23 0055  Place in Observation  Once                        Admitting Diagnosis: Nose injury [S09 92XA]  Closed nondisplaced fracture of fifth cervical vertebra, unspecified fracture morphology, initial encounter ([facility] [address] ) [S12 401A]    HPI: As documented by Dr Celeste Zaman who evaluated the patient on admission, "Abelardo Gaona is a 68 y o  female with PMHx  A fib, CHF, CKD4, HTN, Dementia, and mitral valve regurg currently on ASA, plavix who presents to [facility] as trauma lvl B after falling out of bed and striking her face  Patient recalls the event and was found down by workers at her facility  Patient endorses facial pain and proximal humeral pain"    Procedures Performed:   Orders Placed This Encounter   Procedures   • Fast Ultrasound   • Laceration repair       Summary of Hospital Course: Patient was placed on the trauma service s/p fall  She sustained a C5 fracture and a scalp and nasal laceration  Her lacerations were repaired  She was seen by neurosurgery who ultimately recommended no bracing needed for her cervical spine  She was neuro intact  She was agitated during her admission  She has baseline dementia and lives in a locked unit  Per daughter, she is often agitated at baseline  Geriatrics saw her during admission and recommends decreasing seroqel to once daily, and discontinuing zoloft and namenda  She should f/u with her PCP  She is medically stable for discharge   Her facial sutures can be removed at her facility in one week or she can f/u in the trauma office for removal  She is to f/u with neurosurgery in 2 weeks with upright xrays  Significant Findings, Care, Treatment and Services Provided:   XR humerus left    Result Date: 3/1/2023  Impression: No acute osseous abnormality  Workstation performed: [id number]     XR femur 2 vw left    Result Date: 3/1/2023  Impression: No acute osseous abnormality  Workstation performed: [id number]     TRAUMA - CT head wo contrast    Result Date: 3/1/2023  Impression: 1  No acute intracranial abnormality  2   Small foci of air as described, most likely secondary to IV catheter insertion I personally discussed impression 1 with Alanna Sotelo on 3/1/2023 at 12:13 AM  Workstation performed: [id number]     TRAUMA - CT spine cervical wo contrast    Result Date: 3/1/2023  Impression: Anterior-inferior C5 vertebral body fracture I personally discussed this study with Alanna Sotelo on 3/1/2023 at 12:13AM  Workstation performed: [id number]     XR chest 1 view    Result Date: 3/1/2023  Impression: 1  No acute cardiopulmonary disease within limitations of supine imaging  2   No traumatic abnormality of the pelvis Workstation performed: [id number]     XR pelvis ap only 1 or 2 vw    Result Date: 3/1/2023  Impression: 1  No acute cardiopulmonary disease within limitations of supine imaging  2   No traumatic abnormality of the pelvis Workstation performed: [id number]     TRAUMA - CT chest abdomen pelvis w contrast    Result Date: 3/1/2023  Impression: 1  No acute traumatic abnormality 2   4 9 cm abdominal aortic aneurysm, reportedly measured 4 1 cm in 2020 I personally discussed this study with Alanna Sotelo on 3/1/2023 at 12:13 AM  Workstation performed: [id number]     XR Trauma multiple (SLB/SLRA trauma bay ONLY)    Result Date: 3/1/2023  Impression: 1  No acute cardiopulmonary disease within limitations of supine imaging   2   No traumatic abnormality of the pelvis Workstation performed: [id number]       Complications: no new    Condition at Discharge: good Discharge instructions/Information to patient and family:   See after visit summary for information provided to patient and family  Provisions for Follow-Up Care:  See after visit summary for information related to follow-up care and any pertinent home health orders  PCP: Briana Ramirez MD    Disposition: Short-term rehab at [address] [place] [address],[address] Readmission: No    Discharge Statement   I spent 25 minutes discharging the patient  This time was spent on the day of discharge  I had direct contact with the patient on the day of discharge  Additional documentation is required if more than 30 minutes were spent on discharge  Discharge Medications:  See after visit summary for reconciled discharge medications provided to patient and family

## 2023-03-01 NOTE — ASSESSMENT & PLAN NOTE
Wt Readings from Last 3 Encounters:   02/28/23 85 6 kg (188 lb 11 4 oz)     - hx CHF w/ ECHO 09/2/21 demonstrating Left ventricular cavity size is normal  Wall thickness is mildly increased  The left ventricular ejection fraction is 50% by visual estimation   Systolic function is low normal    - pt has taken bumetanide 1 mg daily in the past, given contrast load upon admission w/ repeat contrast administration w/in 8h for CTA and pre-existing CKD4 will hold diuretics  - monitor daily weights  - Diuresis as needed

## 2023-03-01 NOTE — QUICK NOTE
Updated patient's daughter, Oliva Watson by phone regarding patient's injury and need for cervical brace  I also explained that patient is in restraints due to agitation and required two doses of zyprexa this morning  She verbalized understanding and admitted that her mom can be agitated at times with her dementia  I explained we would attempt to get her back to her facility as soon as possible as well and she was appreciative

## 2023-03-01 NOTE — CONSULTS
[facility] 1945, 68 y o  female MRN: [id number]  Unit/Bed#: S -01 Encounter: [id number]  Primary Care Provider: Gricelda Hale MD   Date and time admitted to hospital: 2/28/2023 11:37 PM    Inpatient consult to Neurosurgery  Consult performed by: Malina Capellan PA-C  Consult ordered by: Miguelina Alvarado DO      consult completed on 3/1/2023 at 0730    Closed nondisplaced fracture of fifth cervical vertebra with routine healing  Assessment & Plan  C5 anterior inferior fracture  · S/p fall out bed 2/28/2023  · Baseline dementia, combative and uncooperative on exam but seems to be MIRANDA without focal weakness    Imaging reviewed personally and with attending, results are as follows:  • CT cervical spine 2/28/2023:  Anterior-inferior C5 vertebral body fracture    Plan:  • Continue to monitor neuro exam  • No plans for surgery at this juncture, will continue with conservative management  o [place] collar at all times, nathaniel collar for showering  o Upright XR cervical spine in collar to assess spinal alignment  o Collar to be worn for 6-12 weeks pending patient progress and imaging stability  o Cervical spine precautions - no bending, twisting or lifting more than 10lbs  o CTA head and neck ordered however unclear indication for this given no transverse foramen involvement, consider deferring this study if not clinically warranted  • Medical management and pain control per primary team  • DVT ppx:  SCDs, sqh  • Mobilize as tolerated with assistance, PT / OT evaluation    Neurosurgery will review imaging once completed  If stable, plan for follow up in 2 weeks with AP and repeat upright XR cervical spine  Please call with questions or concerns        Fall  Assessment & Plan  · Fall out of bed 2/28/2023  · CT head and CT CAP without acute injury  · See plan above    Dementia [facility])  Assessment & Plan  Confused at baseline      History of Present Illness   HPI: Fox Argueta is a 68y o  year old female with PMH including dementia, CHF, HTN, CKD who presents s/p fall out of bed with C5 fracture  Patient is confused and very uncooperative on exam, in restraints and yelling at staff  She is seen at the bedside with nursing and trauma AP, zyprexa being given and patient is screaming "whore, whore, whore, bastard"  She does not want to participate in exam or give a history  She is in wrist restraints  Per chart review, she fell out of bed overnight and hit her face / head on end table  She has extensive swelling and bruising on her face with lac repair on the bridge of her nose  Review of Systems   Unable to perform ROS: Dementia       Historical Information   No past medical history on file  No past surgical history on file  Social History     Substance and Sexual Activity   Alcohol Use Not Currently    Comment: unknown pt confused     Social History     Substance and Sexual Activity   Drug Use Not on file     Social History     Tobacco Use   Smoking Status Unknown   Smokeless Tobacco Not on file     No family history on file      Meds/Allergies   all current active meds have been reviewed, current meds:   Current Facility-Administered Medications   Medication Dose Route Frequency   • busPIRone (BUSPAR) tablet 5 mg  5 mg Oral Daily   • docusate sodium (COLACE) capsule 100 mg  100 mg Oral BID   • donepezil (ARICEPT) tablet 5 mg  5 mg Oral HS   • heparin (porcine) 5,000 units/mL subcutaneous injection **ADS Override Pull**       • heparin (porcine) subcutaneous injection 5,000 Units  5,000 Units Subcutaneous Q8H [facility]   • memantine (NAMENDA) tablet 5 mg  5 mg Oral Daily   • metoprolol tartrate (LOPRESSOR) tablet 50 mg  50 mg Oral Q12H CUATE   • multi-electrolyte (PLASMALYTE-A/ISOLYTE-S PH 7 4) IV solution  75 mL/hr Intravenous Continuous   • OLANZapine (ZyPREXA) IM injection 2 5 mg  2 5 mg Intramuscular Once   • polyethylene glycol (MIRALAX) packet 17 g 17 g Oral Daily   • QUEtiapine (SEROquel) tablet 25 mg  25 mg Oral BID   • sertraline (ZOLOFT) tablet 50 mg  50 mg Oral Daily   • sterile water injection **ADS Override Pull**        and PTA meds:   Prior to Admission Medications   Prescriptions Last Dose Informant Patient Reported? Taking?    APIXABAN PO Unknown  Yes No   Sig: Take 5 mg by mouth 2 (two) times a day   QUEtiapine (SEROquel) 25 mg tablet Unknown  Yes No   Sig: Take 25 mg by mouth 2 (two) times a day   acetaminophen (TYLENOL) 325 mg tablet Unknown  Yes No   Sig: Take 650 mg by mouth every 6 (six) hours as needed for mild pain   aspirin 81 mg chewable tablet Unknown  Yes No   Sig: Chew 81 mg daily   bumetanide (BUMEX) 1 mg tablet Unknown  Yes No   Sig: Take 1 mg by mouth daily   busPIRone (BUSPAR) 5 mg tablet Unknown  Yes No   Sig: Take 5 mg by mouth 2 (two) times a day   docusate sodium (COLACE) 100 mg capsule Unknown  Yes No   Sig: Take 100 mg by mouth 2 (two) times a day   donepezil (ARICEPT) 5 mg tablet Unknown  Yes No   Sig: Take 5 mg by mouth daily at bedtime   hydrocortisone 1 % cream Unknown  Yes No   Sig: Apply 1 application topically every 6 (six) hours as needed   ipratropium (ATROVENT) 0 03 % nasal spray Unknown  Yes No   Si sprays into each nostril 2 (two) times a day as needed for rhinitis   melatonin 3 mg Unknown  Yes No   Sig: Take 6 mg by mouth daily at bedtime as needed   memantine (NAMENDA) 5 mg tablet Unknown  Yes No   Sig: Take 5 mg by mouth daily   metoprolol tartrate (LOPRESSOR) 50 mg tablet Unknown  Yes No   Sig: Take 50 mg by mouth every 12 (twelve) hours   polyethylene glycol (MIRALAX) 17 g packet Unknown  Yes No   Sig: Take 17 g by mouth daily as needed   senna (SENOKOT) 8 6 MG tablet Unknown  Yes No   Sig: Take 2 tablets by mouth daily at bedtime   sertraline (ZOLOFT) 50 mg tablet Unknown  Yes No   Sig: Take 50 mg by mouth daily   sodium chloride (OCEAN) 0 65 % nasal spray Unknown  Yes No   Si spray into each nostril every 4 (four) hours as needed      Facility-Administered Medications: None     Allergies   Allergen Reactions   • Bee Pollen Other (See Comments)     Unknown reaction   • Pollen Extract Other (See Comments)     Unknown reaction       Objective   I/O     None          Physical Exam  Constitutional:       General: She is awake  Interventions: She is restrained  Cervical collar in place  Comments: combative   HENT:      Head:      Comments: Significant right more than left periorbital swelling and bruising with laceration repair on bridge of nose  Cardiovascular:      Rate and Rhythm: Normal rate  Pulmonary:      Effort: Pulmonary effort is normal  No respiratory distress  Skin:     Findings: Bruising and laceration present  Neurological:      Mental Status: She is alert  Psychiatric:         Mood and Affect: Affect is angry  Speech: Speech normal          Behavior: Behavior is uncooperative, agitated and aggressive  Cognition and Memory: Cognition is impaired  Memory is impaired  Judgment: Judgment is impulsive and inappropriate  Neurologic Exam     Mental Status   Speech: speech is normal   Level of consciousness: alert  Knowledge: poor  Abnormal comprehension  GCS 13-14  Confused, does not want to answer any orientation questions  Calling nursing staff, trauma AP and myself "whores" and "bastard" during exam  Seems to be MIRANDA without focal weakness but refuses to follow commands     Cranial Nerves     CN VIII   Hearing: intact    Motor Exam   Does not follow commands but seems to be MIRANDA without focal weakness, trying to move in bed against restraints     Sensory Exam   Intact to crude touch     Gait, Coordination, and Reflexes     Tremor   Resting tremor: absent  Intention tremor: absent  Action tremor: absent        Vitals:Blood pressure 112/56, pulse 63, temperature 98 °F (36 7 °C), temperature source Oral, resp   rate 18, height 5' 7" (1 702 m), weight 85 6 kg (188 lb 11 4 oz), SpO2 94 %  ,Body mass index is 29 56 kg/m²  Lab Results:   Results from last 7 days   Lab Units 03/01/23  0501 03/01/23  0245 02/28/23  2347   WBC Thousand/uL 6 31  --  6 09   HEMOGLOBIN g/dL 10 1*  --  11 4*   I STAT HEMOGLOBIN g/dl  --   --  12 2   HEMATOCRIT % 33 1*  --  36 9   HEMATOCRIT, ISTAT %  --   --  36   PLATELETS Thousands/uL 145* 149 163   NEUTROS PCT % 69  --  56   MONOS PCT % 11  --  14*     Results from last 7 days   Lab Units 03/01/23  0501 02/28/23  2347   POTASSIUM mmol/L 4 1 4 5   CHLORIDE mmol/L 104 102   CO2 mmol/L 26 28   CO2, I-STAT mmol/L  --  35*   BUN mg/dL 27* 29*   CREATININE mg/dL 1 48* 1 63*   CALCIUM mg/dL 8 8 9 1   ALK PHOS U/L  --  81   ALT U/L  --  27   AST U/L  --  45*   GLUCOSE, ISTAT mg/dl  --  96       Imaging Studies: I have personally reviewed pertinent reports  and I have personally reviewed pertinent films in PACS    XR humerus left    Result Date: 3/1/2023  Impression: No acute osseous abnormality  Workstation performed: [id number]     XR femur 2 vw left    Result Date: 3/1/2023  Impression: No acute osseous abnormality  Workstation performed: [id number]     TRAUMA - CT head wo contrast    Result Date: 3/1/2023  Impression: 1  No acute intracranial abnormality  2   Small foci of air as described, most likely secondary to IV catheter insertion I personally discussed impression 1 with Zuri Pyle on 3/1/2023 at 12:13 AM  Workstation performed: [id number]     TRAUMA - CT spine cervical wo contrast    Result Date: 3/1/2023  Impression: Anterior-inferior C5 vertebral body fracture I personally discussed this study with Zuri Pyle on 3/1/2023 at 12:13AM  Workstation performed: [id number]     XR pelvis ap only 1 or 2 vw    Result Date: 3/1/2023  Impression: 1  No acute cardiopulmonary disease within limitations of supine imaging   2   No traumatic abnormality of the pelvis Workstation performed: [id number]     TRAUMA - CT chest abdomen pelvis w contrast    Result Date: 3/1/2023  Impression: 1  No acute traumatic abnormality 2   4 9 cm abdominal aortic aneurysm, reportedly measured 4 1 cm in 2020 I personally discussed this study with Noreen Harper on 3/1/2023 at 12:13 AM  Workstation performed: [id number]     XR Trauma multiple (B/RA trauma bay ONLY)    Result Date: 3/1/2023  Impression: 1  No acute cardiopulmonary disease within limitations of supine imaging  2   No traumatic abnormality of the pelvis Workstation performed: [id number]     EKG, Pathology, and Other Studies: I have personally reviewed pertinent reports  VTE Prophylaxis: Sequential compression device (Venodyne)  and Heparin    Code Status: Level 1 - Full Code  Advance Directive and Living Will:      Power of :    POLST:      Counseling / Coordination of Care  I spent 15 minutes with the patient

## 2023-03-01 NOTE — ED PROVIDER NOTES
Emergency Department Airway Evaluation and Management Form    History  Obtained from: pt and paramedics  Patient has no allergy information on record  No chief complaint on file  HPI fall, on Eliquis, struck face on the nightstand    No past medical history on file  No past surgical history on file  No family history on file  I have reviewed and agree with the history as documented  Review of Systems    Physical Exam  /58   Pulse 68   Temp 98 °F (36 7 °C) (Oral)   Resp 20   SpO2 92%     Physical Exam airway intact clear bilateral breath sounds    ED Medications  Medications - No data to display    Intubation  Procedures    Notes  No acute airway intervention needed  , remainder of care per primary trauma team        Final Diagnosis  Final diagnoses:   None       ED Provider  Electronically Signed by     Neri Gaspar DO  02/28/23 8106

## 2023-03-01 NOTE — ASSESSMENT & PLAN NOTE
- hx CHF w/ ECHO 09/2/21 demonstrating Left ventricular cavity size is normal  Wall thickness is mildly increased  The left ventricular ejection fraction is 50% by visual estimation   Systolic function is low normal    - pt has taken bumetanide 1 mg daily in the past, given contrast load upon admission w/ repeat contrast administration w/in 8h for CTA and pre-existing CKD4 will hold diuretics  - monitor daily weights  - Diuresis as needed

## 2023-03-01 NOTE — ASSESSMENT & PLAN NOTE
Lab Results   Component Value Date    EGFR 33 03/01/2023    EGFR 30 02/28/2023    CREATININE 1 48 (H) 03/01/2023    CREATININE 1 63 (H) 02/28/2023     - hx CKD4  - Baseline Cr 1 5-2 0   Improved to 1 4    - avoid nephrotoxic agents  - f/u with PCP

## 2023-03-01 NOTE — ASSESSMENT & PLAN NOTE
- noted to have anterior-inferior C5 vertebral body fracture on CT cervical spine 03/01  - NSGY consulted, recs appreciate  - Patient placed in aspen collar  - 75 ml/hr isolyte  - repeat labs 03/01 am w/ repeat dedicated CTA head and neck  - cervical spine precautions

## 2023-03-01 NOTE — PROGRESS NOTES
[facility]  Progress Note - Chaparro Fret 1945, 68 y o  female MRN: [id number]  Unit/Bed#: S -01 Encounter: [id number]  Primary Care Provider: Altgaracia Sims MD   Date and time admitted to hospital: 2/28/2023 11:37 PM    Closed fracture of cervical vertebral body ([facility] [address] [address] )  Assessment & Plan  - noted to have anterior-inferior C5 vertebral body fracture on CT cervical spine 03/01  - NSGY consulted, recs appreciate  - Patient placed in aspen collar  - 75 ml/hr isolyte  - repeat labs 03/01 am w/ repeat dedicated CTA head and neck  - cervical spine precautions    Chronic kidney disease (CKD), stage IV (severe) ([facility])  Assessment & Plan  Lab Results   Component Value Date    EGFR 30 02/28/2023    CREATININE 1 63 (H) 02/28/2023     - hx CKD4  - avoid nephrotoxic agents  - avoid overdiuresis/hypovolemia/hypotension  - trend BMP    Dementia ([facility] )  Assessment & Plan  - confused at baseline  - GCS 14 upon admission (E4 V4 M6)  - continue at-home seroquel 25 mg BID  - continue at-home zoloft 50 mg daily  - continue at-home donezepil 5 mg daily  - continue at-home memantine 5 mg daily  - continue at-home buspirone 5 mg daily      HTN (hypertension)  Assessment & Plan  - hx CHF w/ ECHO 09/2/21 demonstrating Left ventricular cavity size is normal  Wall thickness is mildly increased  The left ventricular ejection fraction is 50% by visual estimation  Systolic function is low normal    - pt has taken bumetanide 1 mg daily in the past, given contrast load upon admission w/ repeat contrast administration w/in 8h for CTA and pre-existing CKD4 will hold diuretics  - monitor daily weights  - Diuresis as needed        CHF (congestive heart failure) ([facility])  Assessment & Plan  Wt Readings from Last 3 Encounters:   02/28/23 85 6 kg (188 lb 11 4 oz)     - hx CHF w/ ECHO 09/2/21 demonstrating Left ventricular cavity size is normal  Wall thickness is mildly increased   The left ventricular ejection fraction is 50% by visual estimation  Systolic function is low normal    - pt has taken bumetanide 1 mg daily in the past, given contrast load upon admission w/ repeat contrast administration w/in 8h for CTA and pre-existing CKD4 will hold diuretics  - monitor daily weights  - Diuresis as needed                Bowel Regimen: colace, miralax  VTE Prophylaxis:Heparin     Disposition: inpatient admission    Subjective   Chief Complaint: fall    Subjective: no acute events overnight  Objective   Vitals:   Temp:  [98 °F (36 7 °C)] 98 °F (36 7 °C)  HR:  [63-70] 63  Resp:  [18-20] 18  BP: (112-131)/(56-70) 112/56    I/O     None           Physical Exam:   General-comfortable  Neuro-no acute neurological deficits; disoriented, aspen collar in place  HEENT-EOM intact no pain on EOM, oropharynx clear and patent  R-sided periorbital ecchymosis  3 cm laceration repaired w/ sutures noted on nasal bridge  Cardiac-regular rate rhythm, no murmurs rubs or gallops  Pulmonary-clear to auscultation bilaterally, no adventitious breath sounds  GI-soft, nontender, no distension, normal bowel sounds  -voiding  Musculoskeletal-moves all extremities; neurovascularly intact  Skin-warm and well perfused      Invasive Devices     Peripheral Intravenous Line  Duration           Peripheral IV 02/28/23 Left Antecubital <1 day                      Lab Results: Results: I have personally reviewed all pertinent laboratory/tests results  Imaging: I have personally reviewed pertinent reports       Other Studies: n/a

## 2023-03-01 NOTE — ASSESSMENT & PLAN NOTE
Wt Readings from Last 3 Encounters:   02/28/23 85 6 kg (188 lb 11 4 oz)     - hx CHF w/ ECHO 09/2/21 demonstrating Left ventricular cavity size is normal  Wall thickness is mildly increased  The left ventricular ejection fraction is 50% by visual estimation   Systolic function is low normal    - Resume home medications on discharge  - monitor daily weights  - Diuresis as needed

## 2023-03-01 NOTE — H&P
[address] 1945, 68 y o  female MRN: [id number]  Unit/Bed#: S -01 Encounter: [id number]  Primary Care Provider: Andry Kellogg MD   Date and time admitted to hospital: 2/28/2023 11:37 PM    Closed fracture of cervical vertebral body ([facility] [address] )  Assessment & Plan  - noted to have anterior-inferior C5 vertebral body fracture on CT cervical spine 03/01  - NSGY consulted, recs appreciate  - Patient placed in aspen collar  - 75 ml/hr isolyte  - repeat labs 03/01 am w/ repeat dedicated CTA head and neck  - cervical spine precautions    Chronic kidney disease (CKD), stage IV (severe) ([facility])  Assessment & Plan  Lab Results   Component Value Date    EGFR 30 02/28/2023    CREATININE 1 63 (H) 02/28/2023     - hx CKD4  - avoid nephrotoxic agents  - avoid overdiuresis/hypovolemia/hypotension  - trend BMP    Dementia ([facility] [address] )  Assessment & Plan  - confused at baseline  - GCS 14 upon admission (E4 V4 M6)  - continue at-home seroquel 25 mg BID  - continue at-home zoloft 50 mg daily  - continue at-home donezepil 5 mg daily  - continue at-home memantine 5 mg daily  - continue at-home buspirone 5 mg daily      HTN (hypertension)  Assessment & Plan  - hx CHF w/ ECHO 09/2/21 demonstrating Left ventricular cavity size is normal  Wall thickness is mildly increased  The left ventricular ejection fraction is 50% by visual estimation  Systolic function is low normal    - pt has taken bumetanide 1 mg daily in the past, given contrast load upon admission w/ repeat contrast administration w/in 8h for CTA and pre-existing CKD4 will hold diuretics  - monitor daily weights  - Diuresis as needed        CHF (congestive heart failure) ([facility])  Assessment & Plan  Wt Readings from Last 3 Encounters:   02/28/23 85 6 kg (188 lb 11 4 oz)     - hx CHF w/ ECHO 09/2/21 demonstrating Left ventricular cavity size is normal  Wall thickness is mildly increased   The left ventricular ejection fraction is 50% by visual estimation  Systolic function is low normal    - pt has taken bumetanide 1 mg daily in the past, given contrast load upon admission w/ repeat contrast administration w/in 8h for CTA and pre-existing CKD4 will hold diuretics  - monitor daily weights  - Diuresis as needed                Trauma Alert: Level B   Model of Arrival: Ambulance    Trauma Team: Attending Mary Anne Meredith and Residents Eusebio Main  Consultants:     Neurosurgery: routine consult; Epic consult order placed; History of Present Illness     Chief Complaint: fell and hit head  Mechanism:Fall     HPI:    Charleen Smart is a 68 y o  female with PMHx  A fib, CHF, CKD4, HTN, Dementia, and mitral valve regurg currently on ASA, plavix who presents to [facility] as trauma lvl B after falling out of bed and striking her face  Patient recalls the event and was found down by workers at her facility  Patient endorses facial pain and proximal humeral pain    Review of Systems   Constitutional: Negative  HENT: Negative  Eyes: Negative  Respiratory: Negative  Cardiovascular: Negative  Gastrointestinal: Negative  Endocrine: Negative  Genitourinary: Negative  Musculoskeletal: Negative  L hip pain   Skin: Negative  Allergic/Immunologic: Negative  Neurological: Negative  Hematological: Negative  Psychiatric/Behavioral: Negative  All other systems reviewed and are negative  12-point, complete review of systems was reviewed and negative except as stated above  Historical Information     No past medical history on file  No past surgical history on file  Unable to obtain history due to Dementia       Immunization History   Administered Date(s) Administered   • Tdap 03/01/2023     Last Tetanus: n/a  Family History: Non-contributory    1  Before the illness or injury that brought you to the Emergency, did you need someone to help you on a regular basis? 1=Yes   2   Since the illness or injury that brought you to the Emergency, have you needed more help than usual to take care of yourself? 1=Yes   3  Have you been hospitalized for one or more nights during the past 6 months (excluding a stay in the Emergency Department)? 1=Yes   4  In general, do you see well? 1=No   5  In general, do you have serious problems with your memory? 1=Yes   6  Do you take more than three different medications everyday? 1=Yes   TOTAL   6     Did you order a geriatric consult if the score was 2 or greater?: yes     Meds/Allergies   all current active meds have been reviewed     Allergies   Allergen Reactions   • Bee Pollen Other (See Comments)     Unknown reaction   • Pollen Extract Other (See Comments)     Unknown reaction       Objective   Initial Vitals:   Temperature: 98 °F (36 7 °C) (02/28/23 2341)  Pulse: 68 (02/28/23 2341)  Respirations: 20 (02/28/23 2341)  Blood Pressure: 113/58 (02/28/23 2341)    Primary Survey:   Airway:        Status: patent;        Pre-hospital Interventions: none        Hospital Interventions: none  Breathing:        Pre-hospital Interventions: none       Effort: normal       Right breath sounds: normal       Left breath sounds: normal  Circulation:        Rhythm:        Rate: regular   Right Pulses Left Pulses    R radial: 2+  R femoral: 2+  R pedal: 2+     L radial: 2+  L femoral: 2+  L pedal: 2+       Disability:        GCS: Eye: 4; Verbal: 5 Motor: 6 Total: 15       Right Pupil: 3 mm;  round;  reactive         Left Pupil:  3 mm;  round;  reactive      R Motor Strength L Motor Strength               Exposure:           Secondary Survey:  Physical Exam  Vitals and nursing note reviewed  Constitutional:       General: She is not in acute distress  Appearance: Normal appearance  She is normal weight  She is not ill-appearing, toxic-appearing or diaphoretic  HENT:      Head: Normocephalic  Comments: Periorbital ecchymosis noted around R orbit     Nose:      Comments: 2 cm laceration noted on anterior nasal bridge   Blood at nares  No septal hematoma  Eyes:      General: No scleral icterus  Right eye: No discharge  Left eye: No discharge  Extraocular Movements: Extraocular movements intact  Conjunctiva/sclera: Conjunctivae normal       Pupils: Pupils are equal, round, and reactive to light  Cardiovascular:      Rate and Rhythm: Normal rate  Pulses: Normal pulses  Heart sounds: Normal heart sounds  No murmur heard  No friction rub  No gallop  Pulmonary:      Effort: Pulmonary effort is normal  No respiratory distress  Breath sounds: Normal breath sounds  No stridor  No wheezing, rhonchi or rales  Chest:      Chest wall: No tenderness  Abdominal:      General: Abdomen is flat  Bowel sounds are normal  There is no distension  Palpations: Abdomen is soft  There is no mass  Tenderness: There is no abdominal tenderness  There is no guarding or rebound  Hernia: No hernia is present  Musculoskeletal:         General: Normal range of motion  Cervical back: Normal range of motion  Right lower leg: No edema  Left lower leg: No edema  Skin:     General: Skin is warm  Capillary Refill: Capillary refill takes less than 2 seconds  Neurological:      General: No focal deficit present  Mental Status: She is disoriented  Psychiatric:         Mood and Affect: Mood normal          Invasive Devices     Peripheral Intravenous Line  Duration           Peripheral IV 02/28/23 Left Antecubital <1 day              Lab Results: Results: I have personally reviewed all pertinent laboratory/tests results    Imaging Results: I have personally reviewed pertinent reports      Chest Xray(s): negative for acute findings   FAST exam(s): negative for acute findings   CT Scan(s): positive for acute findings: anterior-inferior C5 fracture   Additional Xray(s): negative for acute findings     Other Studies: n/a    Code Status: Level 1 - Full Code  Advance Directive and Living Will:      Power of :    POLST:    I have spent 30 minutes with Patient  today in which greater than 50% of this time was spent in counseling/coordination of care regarding Diagnostic results

## 2023-03-01 NOTE — SPEECH THERAPY NOTE
Speech-Language Pathology Bedside Swallow Evaluation        Patient Name: Mary Davey    IFZKS'I Date: 3/1/2023     Problem List  Active Problems:    CHF (congestive heart failure) (HCC)    HTN (hypertension)    Dementia (HCC)    Chronic kidney disease (CKD), stage IV (severe) ([facility])    Closed nondisplaced fracture of fifth cervical vertebra with routine healing    Fall         Past Medical History  No past medical history on file  Past Surgical History  No past surgical history on file  Summary    Pt presents with mild oral dysphagia  Difficulty chewing w/ aspen collar in place  No overt s/s aspiration noted  Recommendations:   Diet: soft/level 3 diet and thin liquids   Meds: whole with liquid   Frequent Oral care: 2x/day  Aspiration precautions  Other Recommendations/ considerations: will cont to follow for diet tolerance       Current Medical Status  Pt is a 68 y o  female who presented to [address] [place] [address]  with s/p fall, nasal fx and C5 fx  Per report patient lives in a facility and was found down near her bed  +laceration to her nasal bridge with bleeding  Patient is reported to normally be a GCS 14 at baseline  She has complaints of left upper arm and left hip pain  Past medical history:   Please see H&P for details    Special Studies:  CT-chest/abd/pelvis w/ contrast: 3/1/23  LUNGS:  Lungs are clear  There is no tracheal or endobronchial lesion      Social/Education/Vocational Hx:  Pt lives in SNF/ECF    Swallow Information   Current Risks for Dysphagia & Aspiration: hx of dementia; hard cervical collar  Current Symptoms/Concerns: hx of dementia  Current Diet: regular diet and thin liquids   Baseline Diet: regular diet and thin liquids  Takes pills- whole w/ water     Baseline Assessment   Behavior/Cognition: alert  Speech/Language Status: able to follow commands inconsistently and limited verbal output  Patient Positioning: upright in bed     Swallow Mechanism Exam   Facial: symmetrical  Labial: WFL  Lingual: WFL  Velum: unable to visualize  Mandible:  decreased ROM  Dentition: adequate- pt c/o loose front tooth on R  Vocal quality:clear/adequate   Volitional Cough: strong/productive   Respiratory: RA    Consistencies Assessed and Performance   Consistencies Administered: thin liquids, puree and soft solids- toast w/o crust    Oral Stage: small pcs of toast were ripped off and given to pt, pt stated she had difficulty biting due to loose front tooth  Pt w/ slow mastication/manipulation  Pt took sips of thin liquids by straw w/ good oral control and transfer  Pharyngeal Stage: swallow initiation appeared [facility] [place] w/ complete laryngeal excursion  Voice clear, no overt s/s aspiration noted  Esophageal Concerns: none reported      Results Reviewed with: patient, RN and PA   Dysphagia Goals: pt will tolerate dysphagia 3 diet  with thin liquids without s/s of aspiration x1-3 sessions        Letha Ochoa [facility]  Speech Pathologist  Available via  tiger text

## 2023-03-01 NOTE — DISCHARGE INSTR - AVS FIRST PAGE
Traumatic Cervical Spine Fracture Discharge Instructions:    - You may wear cervical brace as needed only  - No heavy lifting  No strenuous activities  NO DRIVING  - Please notify MD and/or return to ER immediately if you have increased neck or arm pain  New numbness and/or weakness in your arm  Difficulty swallowing or breathing especially while lying down  Numbness, tingling or weakness in any of your arms or legs

## 2023-03-01 NOTE — ASSESSMENT & PLAN NOTE
C5 anterior inferior fracture  · S/p fall out bed 2/28/2023  · Baseline dementia, combative and uncooperative on exam but seems to be MIRANDA without focal weakness    Imaging reviewed personally and with attending, results are as follows:  • CT cervical spine 2/28/2023:  Anterior-inferior C5 vertebral body fracture    Plan:  • Continue to monitor neuro exam  • No plans for surgery at this juncture, will continue with conservative management  o [place] collar at all times, nathaniel collar for showering  o Upright XR cervical spine in collar to assess spinal alignment  o Collar to be worn for 6-12 weeks pending patient progress and imaging stability  o Cervical spine precautions - no bending, twisting or lifting more than 10lbs  o CTA head and neck ordered however unclear indication for this given no transverse foramen involvement, consider deferring this study if not clinically warranted  • Medical management and pain control per primary team  • DVT ppx:  SCDs, sqh  • Mobilize as tolerated with assistance, PT / OT evaluation    Neurosurgery will review imaging once completed  If stable, plan for follow up in 2 weeks with AP and repeat upright XR cervical spine  Please call with questions or concerns

## 2023-03-02 ENCOUNTER — TELEPHONE (OUTPATIENT)
Dept: NEUROSURGERY | Facility: CLINIC | Age: 78
End: 2023-03-02

## 2023-03-02 LAB — MRSA NOSE QL CULT: NORMAL

## 2023-03-02 NOTE — TELEPHONE ENCOUNTER
3/2/23:  DISCHARGED TO [facility] REHAB # [phone]    SPOKE TO:  Fabrice Catchabner  CONFIRMED OV:  Kee Moncada / Laurie Patino / Mel Hunt / IMAGING    2WK HFU W/ AP  3/20/23 / 11:00 / Peg Ania    IMAGING: XRAY MARISOLINE    PER:   follow up  Received: [facility] HENRIQUE Mcgowan MA  2 week hospital follow up with AP and upright XR cervical spine for C5 fracture   She did not tolerate collar and I am not sure she will be getting upright imaging before she is sent to facility

## 2023-03-20 ENCOUNTER — OFFICE VISIT (OUTPATIENT)
Dept: NEUROSURGERY | Facility: CLINIC | Age: 78
End: 2023-03-20

## 2023-03-20 VITALS
HEART RATE: 56 BPM | DIASTOLIC BLOOD PRESSURE: 60 MMHG | RESPIRATION RATE: 18 BRPM | BODY MASS INDEX: 29.56 KG/M2 | SYSTOLIC BLOOD PRESSURE: 96 MMHG | HEIGHT: 67 IN | TEMPERATURE: 97.9 F

## 2023-03-20 DIAGNOSIS — S12.491D OTHER CLOSED NONDISPLACED FRACTURE OF FIFTH CERVICAL VERTEBRA WITH ROUTINE HEALING, SUBSEQUENT ENCOUNTER: Primary | ICD-10-CM

## 2023-03-20 NOTE — PATIENT INSTRUCTIONS
Cervical Fracture   WHAT YOU NEED TO KNOW:   A cervical fracture is a break in a vertebra (bone) in your neck  The 7 cervical vertebrae are called C1 through C7  Cervical vertebrae support your head and allow your neck to bend and twist  The vertebrae enclose and protect the spinal cord  Nerves in the spinal cord control your ability to move  DISCHARGE INSTRUCTIONS:   Call your local emergency number (911 in the [facility]) or have someone else call if:   You feel lightheaded, short of breath, or have chest pain  You cough up blood  You cannot feel or move your arms or legs  Return to the emergency department if:   You have a sudden, severe headache with nausea and vomiting  You are seeing double or suddenly cannot see  You cannot stay awake  The pins in your halo brace have loosened or look deeper in the skin than before  You feel new weakness or numbness in your hands or fingers  Your arm or leg feels warm, tender, and painful  It may look swollen and red  Call your doctor or neurologist if:   You have a fever  You see a skin rash, redness, or sores under your brace  You have problems swallowing while you are wearing your halo brace  Your neck pain is not getting better even with treatment  You have questions or concerns about your cervical fracture, medicine, or care  Medicines:   Prescription pain medicine  may be given  Ask your healthcare provider how to take this medicine safely  Some prescription pain medicines contain acetaminophen  Do not take other medicines that contain acetaminophen without talking to your healthcare provider  Too much acetaminophen may cause liver damage  Prescription pain medicine may cause constipation  Ask your healthcare provider how to prevent or treat constipation  Take your medicine as directed  Contact your healthcare provider if you think your medicine is not helping or if you have side effects   Tell your provider if you are allergic to any medicine  Keep a list of the medicines, vitamins, and herbs you take  Include the amounts, and when and why you take them  Bring the list or the pill bottles to follow-up visits  Carry your medicine list with you in case of an emergency  Therapy  may be recommended  A physical therapist and an occupational therapist may exercise your arms, legs, and hands  They may also teach you new ways to do things around the house  A speech therapist may work with you to help you talk or swallow  Skin and brace care:  Skin breakdown can lead to deep wounds caused by pressure or pulling on your skin  Check your chin, ears, back of your head, and shoulders for redness or sores if you are wearing a brace  Check the skin daily around halo brace pins for signs of infection, such as redness or bad-smelling drainage  Change your vest lining if it gets wet  Ask your healthcare provider how to care for your halo pins and vest  Ask your provider for more information about using a halo brace, semirigid collar, or soft collar  Follow up with your doctor or neurologist as directed:  Write down your questions so you remember to ask them during your visits  © Copyright Maryjane Lars 2022 Information is for End User's use only and may not be sold, redistributed or otherwise used for commercial purposes  The above information is an  only  It is not intended as medical advice for individual conditions or treatments  Talk to your doctor, nurse or pharmacist before following any medical regimen to see if it is safe and effective for you

## 2023-03-20 NOTE — ASSESSMENT & PLAN NOTE
· Noted during recent hospitalization s/p fall   · Presents for ongoing follow up   · Given patiens dementia and agitation while wearing cervical collar-it was removed and continuing care without bracing  Imaging:   · XR cervical spine: completed at facility  C5 anterior inferior chip fracture again evident  No new abnormality appreciated  Plan:   · Continue to monitor   · Continue care without cervical bracing at this time   · Will continue to complete upright cervical XR for monitoring  · Should imaging be stable at next follow up appointment, consider following up prn at that time given no bracing intervention     · F/u in 4 weeks with XR cervical  Encouraged to call with questions or concerns

## 2023-03-20 NOTE — PROGRESS NOTES
Neurosurgery Office Note  Tala Kwon 68 y o  female MRN: [id number]      Assessment/Plan     Closed nondisplaced fracture of fifth cervical vertebra with routine healing  · Noted during recent hospitalization s/p fall   · Presents for ongoing follow up   · Given patiens dementia and agitation while wearing cervical collar-it was removed and continuing care without bracing  Imaging:   · XR cervical spine: completed at facility  C5 anterior inferior chip fracture again evident  No new abnormality appreciated  Plan:   · Continue to monitor   · Continue care without cervical bracing at this time   · Will continue to complete upright cervical XR for monitoring  · Should imaging be stable at next follow up appointment, consider following up prn at that time given no bracing intervention  · F/u in 4 weeks with XR cervical  Encouraged to call with questions or concerns       Diagnoses and all orders for this visit:    Other closed nondisplaced fracture of fifth cervical vertebra with routine healing, subsequent encounter  -     XR spine cervical 2 or 3 vw injury; Future          I have spent a total time of 20 minutes on 03/20/23 in caring for this patient including Diagnostic results, Risks and benefits of tx options, Impressions, Documenting in the medical record and Reviewing / ordering tests, medicine, procedures    CHIEF COMPLAINT    Chief Complaint   Patient presents with   • Follow-up       HISTORY    History of Present Illness     68y o  year old female who presents to the outpatient neurosurgical office for follow-up regarding her C5 fracture  Patient while in the hospital was removed from her cervical collar secondary to agitation and her baseline dementia  Patient has been managed without it at this time  Upright x-rays were completed at patient's facility showing stable cervical alignment  C5 fracture is again identified    Patient denies any neck pain at rest   She denies any neck pain on palpation  She is a poor historian however  See Discussion    REVIEW OF SYSTEMS    Review of Systems   Musculoskeletal:        2WK HFU-(s/p fall) W/ XRAY CSPINE   All other systems reviewed and are negative  ROS obtained by MA  Reviewed  See HPI       Meds/Allergies     Current Outpatient Medications   Medication Sig Dispense Refill   • acetaminophen (TYLENOL) 325 mg tablet Take 2 tablets (650 mg total) by mouth every 6 (six) hours as needed for mild pain, headaches or fever  0   • acetaminophen (TYLENOL) 325 mg tablet Take 650 mg by mouth every 6 (six) hours as needed for mild pain     • apixaban (ELIQUIS) 5 mg Take 1 tablet (5 mg total) by mouth 2 (two) times a day  0   • aspirin 81 mg chewable tablet Chew 81 mg daily     • bumetanide (BUMEX) 1 mg tablet Take 1 tablet (1 mg total) by mouth daily Do not start before October 8, 2022   0   • busPIRone (BUSPAR) 5 mg tablet Take 5 mg by mouth 2 (two) times a day     • docusate sodium (COLACE) 100 mg capsule Take 100 mg by mouth 2 (two) times a day     • donepezil (ARICEPT) 5 mg tablet Take 5 mg by mouth daily at bedtime     • hydrocortisone 1 % ointment Apply topically 4 (four) times a day as needed for rash 30 g 0   • ipratropium (ATROVENT) 0 03 % nasal spray 2 sprays into each nostril every 12 (twelve) hours 30 mL 0   • melatonin 3 mg Take 6 mg by mouth daily at bedtime as needed     • metoprolol tartrate (LOPRESSOR) 50 mg tablet Take 1 tablet (50 mg total) by mouth 2 (two) times a day  0   • polyethylene glycol (MIRALAX) 17 g packet Take 17 g by mouth daily as needed     • QUEtiapine (SEROquel) 25 mg tablet Take 1 tablet (25 mg total) by mouth daily at bedtime 10 tablet 0   • senna (SENOKOT) 8 6 MG tablet Take 2 tablets by mouth daily at bedtime     • senna (SENOKOT) 8 6 mg Take 2 tablets (17 2 mg total) by mouth daily at bedtime  0   • sodium chloride (OCEAN) 0 65 % nasal spray 1 spray into each nostril every 4 (four) hours as needed     • APIXABAN PO Take 5 mg by mouth 2 (two) times a day     • aspirin 81 mg chewable tablet Chew 81 mg daily     • bumetanide (BUMEX) 1 mg tablet Take 1 mg by mouth daily     • docusate sodium (COLACE) 100 mg capsule Take 1 capsule (100 mg total) by mouth 2 (two) times a day  0   • hydrocortisone 1 % cream Apply 1 application topically every 6 (six) hours as needed     • ipratropium (ATROVENT) 0 03 % nasal spray 2 sprays into each nostril 2 (two) times a day as needed for rhinitis     • metoprolol tartrate (LOPRESSOR) 50 mg tablet Take 50 mg by mouth every 12 (twelve) hours     • polyethylene glycol (MIRALAX) 17 g packet Take 17 g by mouth daily as needed (Constipation)  0     No current facility-administered medications for this visit  Allergies   Allergen Reactions   • Bee Pollen Allergic Rhinitis     Runny nose   • Bee Pollen Allergic Rhinitis   • Bee Pollen Other (See Comments)     Unknown reaction   • Pollen Extract      Runny nose   • Pollen Extract Other (See Comments)     Unknown reaction       PAST HISTORY    Past Medical History:   Diagnosis Date   • Arthritis    • CHF (congestive heart failure) (Formerly [facility])    • CKD (chronic kidney disease)    • COPD (chronic obstructive pulmonary disease) (Formerly [facility])    • Coronary artery disease    • Hypertension        History reviewed  No pertinent surgical history  Social History     Tobacco Use   • Smoking status: Unknown   • Smokeless tobacco: Never   Vaping Use   • Vaping Use: Never used   Substance Use Topics   • Alcohol use: Not Currently     Comment: unknown pt confused   • Drug use: Never       History reviewed  No pertinent family history  Above history personally reviewed  EXAM    Vitals:Blood pressure 96/60, pulse 56, temperature 97 9 °F (36 6 °C), temperature source Esophageal, resp  rate 18, height 5' 7" (1 702 m), not currently breastfeeding  ,Body mass index is 29 56 kg/m²  Physical Exam  Constitutional:       Appearance: Normal appearance  She is well-developed  HENT:      Head: Normocephalic and atraumatic  Eyes:      Extraocular Movements: Extraocular movements intact and EOM normal    Neck:      Vascular: No JVD  Trachea: No tracheal deviation  Cardiovascular:      Rate and Rhythm: Normal rate  Pulmonary:      Effort: Pulmonary effort is normal  No respiratory distress  Abdominal:      General: There is no distension  Palpations: Abdomen is soft  Musculoskeletal:         General: No deformity  Normal range of motion  Cervical back: Normal range of motion and neck supple  No rigidity or tenderness  Skin:     General: Skin is warm and dry  Neurological:      Mental Status: She is alert and oriented to person, place, and time  Cranial Nerves: No cranial nerve deficit  Sensory: No sensory deficit  Motor: No weakness  Deep Tendon Reflexes: Reflexes are normal and symmetric  Psychiatric:         Speech: Speech normal          Behavior: Behavior normal          Thought Content: Thought content normal          Neurologic Exam     Mental Status   Oriented to person, place, and time  Attention: normal    Speech: speech is normal   Level of consciousness: alert  Knowledge: good  Normal comprehension  Cranial Nerves     CN III, IV, VI   Extraocular motions are normal    Upgaze: normal  Downgaze: normal    CN VII   Facial expression full, symmetric       CN VIII   CN VIII normal    Hearing: intact    Motor Exam   Muscle bulk: normal  Right arm tone: normal  Left arm tone: normal  Right leg tone: normal  Left leg tone: normal    Strength   Right deltoid: 5/5  Left deltoid: 5/5  Right biceps: 5/5  Left biceps: 5/5  Right triceps: 5/5  Left triceps: 5/5  Right wrist flexion: 5/5  Left wrist flexion: 5/5  Right wrist extension: 5/5  Left wrist extension: 5/5  Right anterior tibial: 5/5  Left anterior tibial: 5/5  Right gastroc: 5/5  Left gastroc: 5/5    Sensory Exam   Light touch normal      Gait, Coordination, and Reflexes Tremor   Resting tremor: absent  Action tremor: absent    Reflexes   Right Darnell: absent  Left Darnell: absent        MEDICAL DECISION MAKING    Imaging Studies:     XR humerus left    Result Date: 3/1/2023  Narrative: LEFT HUMERUS INDICATION:   humeral pain  COMPARISON:  None VIEWS:  XR HUMERUS LEFT FINDINGS: There is no acute fracture or dislocation  No significant degenerative changes  No lytic or blastic osseous lesion  Soft tissues are unremarkable  Impression: No acute osseous abnormality  Workstation performed: [id number]     XR femur 2 vw left    Result Date: 3/1/2023  Narrative: LEFT FEMUR INDICATION:   L hip pain  COMPARISON:  None VIEWS:  XR FEMUR 2 VW LEFT FINDINGS: There is no acute fracture or dislocation  No significant degenerative changes  No lytic or blastic osseous lesion  Soft tissues are unremarkable  Impression: No acute osseous abnormality  Workstation performed: [id number]     TRAUMA - CT head wo contrast    Result Date: 3/1/2023  Narrative: CT BRAIN - WITHOUT CONTRAST INDICATION:   TRAUMA  Struck face on nightstand, Anticoagulated COMPARISON:  CT 11/6/22 TECHNIQUE:  CT examination of the brain was performed  In addition to axial images, sagittal and coronal 2D reformatted images were created and submitted for interpretation  Radiation dose length product (DLP) for this visit:  1100 mGy-cm   This examination, like all CT scans performed in the [facility], was performed utilizing techniques to minimize radiation dose exposure, including the use of iterative reconstruction and automated exposure control  IMAGE QUALITY:  Diagnostic  FINDINGS: PARENCHYMA: Decreased attenuation is noted in periventricular and subcortical white matter demonstrating an appearance that is statistically most likely to represent mild microangiopathic change  No CT signs of acute infarction  No intracranial mass, mass effect or midline shift  No acute parenchymal hemorrhage   VENTRICLES AND EXTRA-AXIAL SPACES:  Normal for the patient's age  VISUALIZED ORBITS: Opacification of the right maxillary sinus PARANASAL SINUSES: Normal visualized paranasal sinuses  CALVARIUM AND EXTRACRANIAL SOFT TISSUES:  There is scattered foci of soft tissue air throughout the neck soft tissues,  spaces, and even located just posterior to the clivus, this is most likely secondary to small amount of air within the IV catheter  Frontal scalp injury  Impression: 1  No acute intracranial abnormality  2   Small foci of air as described, most likely secondary to IV catheter insertion I personally discussed impression 1 with Hilda Bell on 3/1/2023 at 12:13 AM  Workstation performed: [id number]     TRAUMA - CT spine cervical wo contrast    Result Date: 3/1/2023  Narrative: CT CERVICAL SPINE - WITHOUT CONTRAST INDICATION:   TRAUMA  COMPARISON:  CT 11/6/22 TECHNIQUE:  CT examination of the cervical spine was performed without intravenous contrast   Contiguous axial images were obtained  Sagittal and coronal reconstructions were performed  Radiation dose length product (DLP) for this visit:  374 mGy-cm   This examination, like all CT scans performed in the [facility], was performed utilizing techniques to minimize radiation dose exposure, including the use of iterative reconstruction and automated exposure control  IMAGE QUALITY:  Diagnostic  FINDINGS: ALIGNMENT:  Normal alignment of the cervical spine  No subluxation  VERTEBRAE:  Mildly displaced fracture at the anterior-inferior aspect of C5 extending into anterior one 3rd of the inferior endplate  Posterior elements are intact  DEGENERATIVE CHANGES:  Moderate multilevel cervical degenerative changes are noted  No critical central canal stenosis   PREVERTEBRAL AND PARASPINAL SOFT TISSUES: Again noted is scattered air likely due to IV insertion THORACIC INLET:  Normal      Impression: Anterior-inferior C5 vertebral body fracture I personally discussed this study with Farrukh Nelson on 3/1/2023 at 12:13AM  Workstation performed: [id number]     XR chest 1 view    Result Date: 3/1/2023  Narrative: TRAUMA SERIES INDICATION:  TRAUMA  COMPARISON:  None VIEWS:  XR TRAUMA MULTIPLE, XR PELVIS AP ONLY 1 OR 2 VW  FINDINGS: CHEST: Supine frontal view of the chest is obtained  Cardiomediastinal silhouette is within normal limits accounting for technique and patient positioning  Lungs are clear  No layering pleural effusions detected  No pneumothorax is seen on this supine film  Upright images are more sensitive to detect anterior pneumothoraces if relevant  No displaced fractures  Single AP view of the pelvis: No fracture or dislocation  Degenerative changes of the lower lumbar spine  Calcified aorta  Impression: 1  No acute cardiopulmonary disease within limitations of supine imaging  2   No traumatic abnormality of the pelvis Workstation performed: [id number]     XR pelvis ap only 1 or 2 vw    Result Date: 3/1/2023  Narrative: TRAUMA SERIES INDICATION:  TRAUMA  COMPARISON:  None VIEWS:  XR TRAUMA MULTIPLE, XR PELVIS AP ONLY 1 OR 2 VW  FINDINGS: CHEST: Supine frontal view of the chest is obtained  Cardiomediastinal silhouette is within normal limits accounting for technique and patient positioning  Lungs are clear  No layering pleural effusions detected  No pneumothorax is seen on this supine film  Upright images are more sensitive to detect anterior pneumothoraces if relevant  No displaced fractures  Single AP view of the pelvis: No fracture or dislocation  Degenerative changes of the lower lumbar spine  Calcified aorta  Impression: 1  No acute cardiopulmonary disease within limitations of supine imaging  2   No traumatic abnormality of the pelvis Workstation performed: [id number]     TRAUMA - CT chest abdomen pelvis w contrast    Result Date: 3/1/2023  Narrative: CT CHEST, ABDOMEN AND PELVIS WITH IV CONTRAST INDICATION:   TRAUMA   COMPARISON: None  TECHNIQUE: CT examination of the chest, abdomen and pelvis was performed  Axial, sagittal, and coronal 2D reformatted images were created from the source data and submitted for interpretation  Radiation dose length product (DLP) for this visit:  756 mGy-cm   This examination, like all CT scans performed in the [facility], was performed utilizing techniques to minimize radiation dose exposure, including the use of iterative reconstruction and automated exposure control  IV Contrast:  100 mL of iohexol (OMNIPAQUE) Enteric Contrast: Enteric contrast was administered  FINDINGS: CHEST LUNGS:  Lungs are clear  There is no tracheal or endobronchial lesion  PLEURA:  Unremarkable  HEART/GREAT VESSELS: Cardiomegaly  No thoracic aortic aneurysm  MEDIASTINUM AND FREDERICK:  Unremarkable  CHEST WALL AND LOWER NECK:  Unremarkable  ABDOMEN LIVER/BILIARY TREE:  Hypodense structures in the left lobe measures simple fluid attenuation GALLBLADDER:  No calcified gallstones  No pericholecystic inflammatory change  SPLEEN:  Unremarkable  PANCREAS:  Unremarkable  ADRENAL GLANDS:  Unremarkable  KIDNEYS/URETERS:  Unremarkable  No hydronephrosis  STOMACH AND BOWEL:  Unremarkable  APPENDIX:  A normal appendix was visualized  ABDOMINOPELVIC CAVITY:  No ascites  No pneumoperitoneum  No lymphadenopathy  VESSELS:  Infrarenal abdominal aortic aneurysm measures as large as 4 9 cm coronally on series 603/77, reportedly this measured as large as 4 1 cm of the prior CT 11/12/20 (images not available at the time of dictation)  Circumferential mural thrombus is present  There is significant soft tissue plaque within the aorta at the level of the hiatus PELVIS REPRODUCTIVE ORGANS:  Unremarkable for patient's age  URINARY BLADDER:  Unremarkable  ABDOMINAL WALL/INGUINAL REGIONS:  Unremarkable  OSSEOUS STRUCTURES:  No acute fracture or destructive osseous lesion  Impression: 1    No acute traumatic abnormality 2   4 9 cm abdominal aortic aneurysm, reportedly measured 4 1 cm in 2020 I personally discussed this study with Eliseo Darling on 3/1/2023 at 12:13 AM  Workstation performed: [id number]     XR Trauma multiple ([facility] trauma bay ONLY)    Result Date: 3/1/2023  Narrative: TRAUMA SERIES INDICATION:  TRAUMA  COMPARISON:  None VIEWS:  XR TRAUMA MULTIPLE, XR PELVIS AP ONLY 1 OR 2 VW  FINDINGS: CHEST: Supine frontal view of the chest is obtained  Cardiomediastinal silhouette is within normal limits accounting for technique and patient positioning  Lungs are clear  No layering pleural effusions detected  No pneumothorax is seen on this supine film  Upright images are more sensitive to detect anterior pneumothoraces if relevant  No displaced fractures  Single AP view of the pelvis: No fracture or dislocation  Degenerative changes of the lower lumbar spine  Calcified aorta  Impression: 1  No acute cardiopulmonary disease within limitations of supine imaging  2   No traumatic abnormality of the pelvis Workstation performed: [id number]     XR outside images    Result Date: 3/20/2023  Narrative: 1 2 [phone] [id number]      I have personally reviewed pertinent reports     and I have personally reviewed pertinent films in PACS

## 2023-04-27 ENCOUNTER — APPOINTMENT (EMERGENCY)
Dept: CT IMAGING | Facility: HOSPITAL | Age: 78
End: 2023-04-27

## 2023-04-27 ENCOUNTER — HOSPITAL ENCOUNTER (EMERGENCY)
Facility: HOSPITAL | Age: 78
Discharge: NURSING, REHAB OR LONG-TERM CARE | End: 2023-04-27
Attending: SURGERY

## 2023-04-27 VITALS
OXYGEN SATURATION: 92 % | SYSTOLIC BLOOD PRESSURE: 118 MMHG | DIASTOLIC BLOOD PRESSURE: 59 MMHG | TEMPERATURE: 97.7 F | WEIGHT: 191.36 LBS | RESPIRATION RATE: 18 BRPM | HEART RATE: 57 BPM

## 2023-04-27 DIAGNOSIS — S00.11XA TRAUMATIC HEMATOMA OF RIGHT EYEBROW, INITIAL ENCOUNTER: ICD-10-CM
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
## 2023-04-27 LAB
BASE EXCESS BLDA CALC-SCNC: 2 MMOL/L (ref -2–3)
CA-I BLD-SCNC: 1.16 MMOL/L (ref 1.12–1.32)
GLUCOSE SERPL-MCNC: 102 MG/DL (ref 65–140)
HCO3 BLDA-SCNC: 27 MMOL/L (ref 24–30)
HCT VFR BLD CALC: 31 % (ref 34.8–46.1)
HGB BLDA-MCNC: 10.5 G/DL (ref 11.5–15.4)
PCO2 BLD: 28 MMOL/L (ref 21–32)
PCO2 BLD: 41.4 MM HG (ref 42–50)
PH BLD: 7.42 [PH] (ref 7.3–7.4)
PO2 BLD: 35 MM HG (ref 35–45)
POTASSIUM BLD-SCNC: 4.1 MMOL/L (ref 3.5–5.3)
SAO2 % BLD FROM PO2: 68 % (ref 60–85)
SODIUM BLD-SCNC: 142 MMOL/L (ref 136–145)
SPECIMEN SOURCE: ABNORMAL

## 2023-04-27 RX ORDER — ACETAMINOPHEN 325 MG/1
650 TABLET ORAL EVERY 6 HOURS PRN
COMMUNITY

## 2023-04-27 RX ORDER — SENNOSIDES 8.6 MG
TABLET ORAL
COMMUNITY

## 2023-04-27 RX ORDER — BUMETANIDE 1 MG/1
1 TABLET ORAL DAILY
COMMUNITY

## 2023-04-27 RX ORDER — BUSPIRONE HYDROCHLORIDE 5 MG/1
5 TABLET ORAL 2 TIMES DAILY
COMMUNITY

## 2023-04-27 RX ORDER — ASPIRIN 81 MG/1
81 TABLET, CHEWABLE ORAL DAILY
COMMUNITY

## 2023-04-27 RX ORDER — DONEPEZIL HYDROCHLORIDE 5 MG/1
5 TABLET, FILM COATED ORAL
COMMUNITY

## 2023-04-27 RX ORDER — POLYETHYLENE GLYCOL 3350 17 G/17G
17 POWDER, FOR SOLUTION ORAL DAILY
COMMUNITY

## 2023-04-27 RX ORDER — QUETIAPINE FUMARATE 25 MG/1
25 TABLET, FILM COATED ORAL
COMMUNITY

## 2023-04-27 RX ORDER — DOCUSATE SODIUM 100 MG/1
100 CAPSULE, LIQUID FILLED ORAL 2 TIMES DAILY
COMMUNITY

## 2023-04-27 NOTE — QUICK NOTE
Cervical Collar Clearance: The patient had a CT scan of the cervical spine demonstrating no acute injury  On exam, the patient had no midline point tenderness or paresthesias/numbness/weakness in the extremities  The patient had full range of motion (was then able to flex, extend, and rotate head laterally) without pain  There were no distracting injuries and the patient was not intoxicated  The patient's cervical spine was cleared radiologically and clinically  Cervical collar removed at this time       Benjamin Jefferson DO  4/27/2023 5:07 AM

## 2023-04-27 NOTE — PROCEDURES
POC FAST US    Date/Time: 4/27/2023 1:50 AM  Performed by: Pidead Gage DO  Authorized by:  Piedad Gage DO     Patient location:  Trauma  Procedure details:     Exam Type:  Diagnostic    Indications: blunt abdominal trauma      Assess for:  Intra-abdominal fluid and pericardial effusion    Technique: FAST      Views obtained:  Heart - Pericardial sac, RUQ - Monge's Pouch, LUQ - Splenorenal space and Suprapubic - Pouch of Hector    Image quality: diagnostic      Image availability:  Images available in PACS  FAST Findings:     RUQ (Hepatorenal) free fluid: absent      LUQ (Splenorenal) free fluid: absent      Suprapubic free fluid: absent      Cardiac wall motion: identified      Pericardial effusion: absent    Interpretation:     Impressions: negative

## 2023-04-27 NOTE — H&P
H&P - Trauma   Nikole Borrego 66 y o  female MRN: [id number]  Unit/Bed#: ED-23 Encounter: [id number]    Trauma Alert: Level B   Model of Arrival: Ambulance    Trauma Team: Attending Sonia Valdes and Residents Casandra Naranjo  Consultants:     None     Assessment/Plan   Active Problems / Assessment:   - Fall  - Forehead hematoma  - Right hand skin tear  - Dementia       Plan:   -Chest x-ray and CT scans negative for intracranial or bony abnormalities  -No acute injuries requiring admission at this time   -Right hand wound cleaned and dressed in the emergency department  -Patient denies pain at this time and is appropriate for discharge back to her long-term care facility  -Instructions for pain control and return precautions written in patient's AVS for staff at long-term care facility    History of Present Illness     Chief Complaint: Pain in the chin (where the cervical collar is rubbing)  Mechanism:Fall     HPI:    Nikole Borrego is a 66 y o  female with dementia, CHF, CAD, COPD, HTN, paroxysmal A-fib on Eliquis, CKD stage IV who presents after unwitnessed fall at home  Staff at her nursing facility found her on the floor and noticed a hematoma to the right eyebrow  Patient is unable to express when she fell due to dementia  Patient denies pain right now  Notably, patient fell several months ago and was found to have a displaced fracture of the fifth cervical vertebra  She has been following up with neurosurgical team who removed her cervical collar at the last appointment    Review of Systems   Unable to perform ROS: Dementia     12-point, complete review of systems was reviewed and negative except as stated above       Historical Information   -Congestive heart failure  -Atherosclerotic heart disease of native coronary artery without angina pectoris  -Depression  -Essential hypertension  -Mitral valve insufficiency  -Paroxysmal atrial fibrillation  -Dementia  -Anxiety  -Chronic kidney disease stage IV  -Cognitive communication deficit  -History of displaced fracture of the fifth cervical vertebrae  -Ambulatory dysfunction        Unable to obtain history due to Dementia         There is no immunization history on file for this patient  Last Tetanus: March 2023  Family History: Non-contributory    1  Before the illness or injury that brought you to the Emergency, did you need someone to help you on a regular basis? 1=Yes   2  Since the illness or injury that brought you to the Emergency, have you needed more help than usual to take care of yourself? 1=Yes   3  Have you been hospitalized for one or more nights during the past 6 months (excluding a stay in the Emergency Department)? 1=Yes   4  In general, do you see well? 0=Yes   5  In general, do you have serious problems with your memory? 1=Yes   6  Do you take more than three different medications everyday? 1=Yes   TOTAL   5     Did you order a geriatric consult if the score was 2 or greater?: yes     Meds/Allergies   all current active meds have been reviewed and PTA meds:   Prior to Admission Medications   Prescriptions Last Dose Informant Patient Reported? Taking?    QUEtiapine (SEROquel) 25 mg tablet   Yes Yes   Sig: Take 25 mg by mouth daily at bedtime   acetaminophen (TYLENOL) 325 mg tablet   Yes Yes   Sig: Take 650 mg by mouth every 6 (six) hours as needed for mild pain   apixaban (Eliquis) 2 5 mg   Yes Yes   Sig: Take by mouth 2 (two) times a day   aspirin 81 mg chewable tablet   Yes Yes   Sig: Chew 81 mg daily   bumetanide (BUMEX) 1 mg tablet   Yes Yes   Sig: Take 1 mg by mouth daily   busPIRone (BUSPAR) 5 mg tablet   Yes Yes   Sig: Take 5 mg by mouth 2 (two) times a day   docusate sodium (COLACE) 100 mg capsule   Yes Yes   Sig: Take 100 mg by mouth 2 (two) times a day   donepezil (ARICEPT) 5 mg tablet   Yes Yes   Sig: Take 5 mg by mouth daily at bedtime   metoprolol tartrate (LOPRESSOR) 25 mg tablet   Yes Yes   Sig: Take 50 mg by mouth every 12 (twelve) hours polyethylene glycol (MIRALAX) 17 g packet   Yes Yes   Sig: Take 17 g by mouth daily   senna (SENOKOT) 8 6 mg   Yes Yes   Sig: Take by mouth daily at bedtime      Facility-Administered Medications: None     Allergies have not been reviewed; Not on File    Objective   Initial Vitals:   Temperature: (!) 96 5 °F (35 8 °C) (04/27/23 0122)  Pulse: 57 (04/27/23 0117)  Respirations: 20 (04/27/23 0117)  Blood Pressure: 127/73 (04/27/23 0117)    Primary Survey:   Airway:        Status: patent;        Pre-hospital Interventions: none        Hospital Interventions: none  Breathing:        Pre-hospital Interventions: none       Effort: normal       Right breath sounds: normal       Left breath sounds: normal  Circulation:        Rhythm: irregular       Rate: bradycardia   Right Pulses Left Pulses    R radial: 2+  R femoral: 2+  R pedal: 2+     L radial: 2+  L femoral: 2+  L pedal: 2+       Disability:        GCS: Eye: 4; Verbal: 4 Motor: 6 Total: 14       Right Pupil: 3 mm;  round;  reactive         Left Pupil:  3 mm;  round;  reactive      R Motor Strength L Motor Strength             Sensory:  No sensory deficit  Exposure:       Completed: Yes      Secondary Survey:  Physical Exam  Vitals reviewed  Constitutional:       General: She is not in acute distress  Appearance: Normal appearance  She is not ill-appearing or toxic-appearing  HENT:      Head: Normocephalic  Comments: 3 cm x 4 cm hematoma to the right eyebrow     Right Ear: External ear normal       Left Ear: External ear normal       Nose: Nose normal       Mouth/Throat:      Mouth: Mucous membranes are moist       Pharynx: Oropharynx is clear  Eyes:      Extraocular Movements: Extraocular movements intact  Conjunctiva/sclera: Conjunctivae normal       Pupils: Pupils are equal, round, and reactive to light  Neck:      Comments: Cervical collar in place  Cardiovascular:      Rate and Rhythm: Bradycardia present  Rhythm irregular        Pulses: Normal pulses  Heart sounds: Normal heart sounds  Pulmonary:      Effort: Pulmonary effort is normal  No respiratory distress  Breath sounds: No wheezing, rhonchi or rales  Abdominal:      General: Abdomen is flat  Palpations: Abdomen is soft  Tenderness: There is no abdominal tenderness  There is no guarding or rebound  Musculoskeletal:         General: No swelling, tenderness or deformity  Normal range of motion  Skin:     General: Skin is warm and dry  Capillary Refill: Capillary refill takes less than 2 seconds  Comments: 1 5 cm skin tear to the back of the right hand without bleeding   Neurological:      Mental Status: She is alert  Mental status is at baseline  Cranial Nerves: No cranial nerve deficit  Invasive Devices     Peripheral Intravenous Line  Duration           Peripheral IV 04/27/23 Left;Ventral (anterior) Forearm <1 day              Lab Results: Results: I have personally reviewed all pertinent laboratory/tests results  Results Reviewed     Procedure Component Value Units Date/Time    POCT Blood Gas (CG8+) [[id number]]  (Abnormal) Collected: 04/27/23 0125    Lab Status: Final result Specimen: Venous Updated: 04/27/23 0128     ph, Hardik ISTAT 7 422     pCO2, Hardik i-STAT 41 4 mm HG      pO2, Hardik i-STAT 35 0 mm HG      BE, i-STAT 2 mmol/L      HCO3, Hardik i-STAT 27 0 mmol/L      CO2, i-STAT 28 mmol/L      O2 Sat, i-STAT 68 %      SODIUM, I-STAT 142 mmol/l      Potassium, i-STAT 4 1 mmol/L      Calcium, Ionized i-STAT 1 16 mmol/L      Hct, i-STAT 31 %      Hgb, i-STAT 10 5 g/dl      Glucose, i-STAT 102 mg/dl      Specimen Type VENOUS        Imaging Results: I have personally reviewed pertinent reports      Chest Xray(s): negative for acute findings   FAST exam(s): negative for acute findings   CT Scan(s): negative for acute findings   Additional Xray(s): N/A     Other Studies: n/a    Code Status: No Order  Advance Directive and Living Will:      Power of :    POLST:    I have spent 30 minutes with Patient  today in which greater than 50% of this time was spent in counseling/coordination of care regarding Diagnostic results, Documenting in the medical record, Reviewing / ordering tests, medicine, procedures  , Obtaining or reviewing history   and Communicating with other healthcare professionals

## 2023-04-27 NOTE — DISCHARGE INSTR - AVS FIRST PAGE
- return to the emergency department if you develop sudden onset weakness or numbness in any part of your body, confusion, difficulty walking or difficulty talking

## 2023-04-27 NOTE — ED PROVIDER NOTES
Emergency Department Airway Evaluation and Management Form    History  Obtained from: EMS & pt  Patient has no allergy information on record  No chief complaint on file  HPI    No past medical history on file  No past surgical history on file  No family history on file  I have reviewed and agree with the history as documented  Review of Systems    Physical Exam  /73   Pulse 62   Temp (!) 96 5 °F (35 8 °C) (Axillary)   Resp 18   SpO2 94%     Physical Exam    ED Medications  Medications - No data to display    Intubation  Procedures    Notes  75-year-old female with dementia on Eliquis presents to the emergency department following fall  This was unwitnessed  She is appreciated to have hematoma above the right eye  She endorses discomfort in the chin (where collar is in position) and in the neck  Airway intact  Speech clear  No respiratory distress  Following logroll/back assessment care continued by trauma team including Dr Ignacia Roberson      Final Diagnosis  Final diagnoses:   None       ED Provider  Electronically Signed by     Ming Weeks MD  04/27/23 1996

## 2023-04-27 NOTE — ED NOTES
Per Efe, pickup time from ED back to facility scheduled for 9AM on 4/27        Sulaiman Nicholas RN  04/27/23 0036

## 2023-05-23 ENCOUNTER — APPOINTMENT (EMERGENCY)
Dept: CT IMAGING | Facility: HOSPITAL | Age: 78
End: 2023-05-23

## 2023-05-23 ENCOUNTER — HOSPITAL ENCOUNTER (EMERGENCY)
Facility: HOSPITAL | Age: 78
Discharge: NURSING, REHAB OR LONG-TERM CARE | End: 2023-05-23
Attending: SURGERY

## 2023-05-23 ENCOUNTER — APPOINTMENT (EMERGENCY)
Dept: RADIOLOGY | Facility: HOSPITAL | Age: 78
End: 2023-05-23

## 2023-05-23 VITALS
OXYGEN SATURATION: 96 % | HEART RATE: 66 BPM | RESPIRATION RATE: 16 BRPM | DIASTOLIC BLOOD PRESSURE: 64 MMHG | TEMPERATURE: 98.1 F | WEIGHT: 198.85 LBS | SYSTOLIC BLOOD PRESSURE: 147 MMHG

## 2023-05-23 LAB
ATRIAL RATE: 68 BPM
BASE EXCESS BLDA CALC-SCNC: 0 MMOL/L (ref -2–3)
CA-I BLD-SCNC: 1.17 MMOL/L (ref 1.12–1.32)
GLUCOSE SERPL-MCNC: 85 MG/DL (ref 65–140)
HCO3 BLDA-SCNC: 24.7 MMOL/L (ref 24–30)
HCT VFR BLD CALC: 29 % (ref 34.8–46.1)
HGB BLDA-MCNC: 9.9 G/DL (ref 11.5–15.4)
HOLD SPECIMEN: NORMAL
P AXIS: 58 DEGREES
PCO2 BLD: 26 MMOL/L (ref 21–32)
PCO2 BLD: 40.7 MM HG (ref 42–50)
PH BLD: 7.39 [PH] (ref 7.3–7.4)
PO2 BLD: 27 MM HG (ref 35–45)
POTASSIUM BLD-SCNC: 4 MMOL/L (ref 3.5–5.3)
PR INTERVAL: 158 MS
QRS AXIS: 56 DEGREES
QRSD INTERVAL: 78 MS
QT INTERVAL: 446 MS
QTC INTERVAL: 474 MS
SAO2 % BLD FROM PO2: 49 % (ref 60–85)
SODIUM BLD-SCNC: 142 MMOL/L (ref 136–145)
SPECIMEN SOURCE: ABNORMAL
T WAVE AXIS: -63 DEGREES
VENTRICULAR RATE: 68 BPM

## 2023-05-23 RX ORDER — HALOPERIDOL 5 MG/ML
5 INJECTION INTRAMUSCULAR ONCE
Status: COMPLETED | OUTPATIENT
Start: 2023-05-23 | End: 2023-05-23

## 2023-05-23 RX ORDER — OLANZAPINE 10 MG/1
5 INJECTION, POWDER, LYOPHILIZED, FOR SOLUTION INTRAMUSCULAR ONCE
Status: DISCONTINUED | OUTPATIENT
Start: 2023-05-23 | End: 2023-05-23

## 2023-05-23 RX ORDER — HALOPERIDOL 5 MG/ML
INJECTION INTRAMUSCULAR
Status: COMPLETED
Start: 2023-05-23 | End: 2023-05-23

## 2023-05-23 RX ORDER — HALOPERIDOL 5 MG/ML
2 INJECTION INTRAMUSCULAR ONCE
Status: COMPLETED | OUTPATIENT
Start: 2023-05-23 | End: 2023-05-23

## 2023-05-23 RX ADMIN — HALOPERIDOL LACTATE 2 MG: 5 INJECTION, SOLUTION INTRAMUSCULAR at 11:08

## 2023-05-23 RX ADMIN — IOHEXOL 100 ML: 350 INJECTION, SOLUTION INTRAVENOUS at 11:04

## 2023-05-23 RX ADMIN — HALOPERIDOL 5 MG: 5 INJECTION INTRAMUSCULAR at 10:31

## 2023-05-23 RX ADMIN — HALOPERIDOL LACTATE 5 MG: 5 INJECTION, SOLUTION INTRAMUSCULAR at 10:31

## 2023-05-23 RX ADMIN — HALOPERIDOL 2 MG: 5 INJECTION INTRAMUSCULAR at 11:08

## 2023-05-23 NOTE — ED PROVIDER NOTES
Emergency Department Airway Evaluation and Management Form    History  Obtained from: EMS, patient unable to provide history  Patient has no allergy information on record  Chief Complaint   Patient presents with   • Trauma     HPI 40-year-old female with a history of dementia presents after having an unwitnessed fall at nursing facility  Patient presents agitated and uncooperative  No overt signs of injury noted on primary survey  Past Medical History:   Diagnosis Date   • CAD (coronary artery disease)    • chf    • CKD (chronic kidney disease)    • COPD (chronic obstructive pulmonary disease) (HCC)    • HTN (hypertension)      History reviewed  No pertinent surgical history  No family history on file  Social History     Tobacco Use   • Smoking status: Unknown   • Smokeless tobacco: Never   Substance Use Topics   • Alcohol use: Not Currently   • Drug use: Never     I have reviewed and agree with the history as documented  Review of Systems  Able to obtain review of systems  Physical Exam  /64 (BP Location: Right arm)   Pulse 66   Temp 98 1 °F (36 7 °C) (Tympanic)   Resp 16   Wt 90 2 kg (198 lb 13 7 oz)   SpO2 96%     Physical Exam patient is awake and alert appears to be at baseline mental status with a GCS of 14  Patient is moving all 4 extremities  Airway is intact    Equal bilateral breath sounds noted    ED Medications  Medications   iohexol (OMNIPAQUE) 350 MG/ML injection (SINGLE-DOSE) 100 mL (has no administration in time range)   haloperidol lactate (HALDOL) injection 5 mg (5 mg Intramuscular Given 5/23/23 1031)   haloperidol lactate (HALDOL) injection 2 mg (2 mg IM- Deltoid Given 5/23/23 1108)   iohexol (OMNIPAQUE) 350 MG/ML injection (SINGLE-DOSE) 100 mL (100 mL Intravenous Given 5/23/23 1104)       Intubation  Procedures    Notes  No acute airway intervention indicated, care relinquished to the trauma team    Final Diagnosis  Final diagnoses:   None       ED Provider  Electronically Signed by     Subha Ferrari, DO  05/23/23 4926

## 2023-05-23 NOTE — INCIDENTAL FINDINGS
The following findings require follow up:  Radiographic Finding   Findin 7 x 4 2 cm fusiform infrarenal abdominal aortic aneurysm and 2nd 4 5 x 3 8 cm fusiform aneurysm just distally, both with either partial thrombosis or large areas of noncalcified atherosclerotic plaque  Additional areas of irregular noncalcified   atherosclerotic plaque seen in the abdominal aorta more proximally and in the descending thoracic aorta  Cardiomegaly with biatrial enlargement      Mild bilateral pulmonary edema      Small bilateral pleural effusions  Area of decreased attenuation in the left corona radiata, which may represent a subacute infarct versus asymmetric chronic microvascular ischemic changes   Follow up required: family doctor   Follow up should be done within 2 week(s)    Patient's daughter, Sandra Cedeno was informed of incidental findings and recommended follow up  She verbalized understanding

## 2023-05-23 NOTE — DISCHARGE INSTR - AVS FIRST PAGE
Seek medical attn if patient has a change in mental status, numbness/weakness/tingling of the extremities, chest pain, abdominal pain, shortness of breath, or persistent nausea/vomiting  Resume all medications as you were previously taking, including Eliquis and Aspirin

## 2023-05-23 NOTE — ED NOTES
Roundtrip ride here for pt  Report given  Soft restraints removed        William Baker, ANA LILIA  05/23/23 8085

## 2023-05-23 NOTE — H&P
"H&P - Annel Webb Rehl 66 y o  female MRN: [id number]  Unit/Bed#: ED-05 Encounter: [id number]    Trauma Alert: Level B   Model of Arrival: Ambulance    Trauma Team: Attending Catina Torres and MONSE Gordillo  Consultants:     None     Assessment/Plan   Active Problems / Assessment:   67 y/o female s/p unwitnessed fall  Agitation in the setting of dementia  History of A  Fib on Eliquis and Aspirin  Back pain  Left corona radiata hypoattenuation concerning for possible subacute infarct vs chronic microvascular ischemic changes  Infrarenal abdominal aortic aneurysms x2     Plan:   No acute traumatic injuries identified on CT Head, cervical spine, C/A/P  Given haldol 7 5 mg IM total for agtitation with imrpovement  EKG completed, QTc 470  I-stat unremarkable  Daughter, Liz Mccormick contacted and informed of her imaging results including incidental findings as stated above (AAA and possible subacute infarct)  She verbalizes understanding and agrees with f/u with PCP for further evaluation and referral if indicated  Will arrange for transport back to facility, [facility]  AND [facility]    History of Present Illness     Chief Complaint: \"Back pain\"  Mechanism:Fall     HPI:    Virginia Altamirano is a 66 y o  female who presents after being found down at her nursing facility by staff in her room  She was reported to be found on the floor, lying on her right side  She was awake and alert, but with garbled speech and more combative than her baseline, per staff  She does have a h/o dementia and at baseline is able to communicate simple responses  EMS was unable to place a cervical collar due to agitation  She did complain of neck pain to EMS and she is now complaining of back pain when asked  She continues to swing, pinch and spit at staff  She was given 5 mg IM haldol in the trauma bay and an additional 2 5 mg IM haldol in CT scan in order to complete studies  She does take ASA and Eliquis       Review of Systems   Unable to perform ROS: " Dementia   Musculoskeletal: Positive for back pain and neck pain  12-point, complete review of systems was reviewed and negative except as stated above  Historical Information     Past Medical History:   Diagnosis Date   • CAD (coronary artery disease)    • chf    • CKD (chronic kidney disease)    • COPD (chronic obstructive pulmonary disease) ([facility])    • HTN (hypertension)      History reviewed  No pertinent surgical history  Unable to obtain history due to Dementia    Social History     Tobacco Use   • Smoking status: Unknown   • Smokeless tobacco: Never   Substance Use Topics   • Alcohol use: Not Currently   • Drug use: Never     Last Tetanus: unknown  Family History: Non-contributory    1  Before the illness or injury that brought you to the Emergency, did you need someone to help you on a regular basis? 1=Yes   2  Since the illness or injury that brought you to the Emergency, have you needed more help than usual to take care of yourself? 1=Yes   3  Have you been hospitalized for one or more nights during the past 6 months (excluding a stay in the Emergency Department)? 0=No   4  In general, do you see well? 1=No   5  In general, do you have serious problems with your memory? 1=Yes   6  Do you take more than three different medications everyday?  1=Yes   TOTAL   4     Did you order a geriatric consult if the score was 2 or greater?: n/a     Meds/Allergies   all current active meds have been reviewed  Allergies: pollen    Objective   Initial Vitals:   Temperature: 98 1 °F (36 7 °C) (05/23/23 1024)  Pulse: 89 (05/23/23 1023)  Respirations: 16 (05/23/23 1023)  Blood Pressure: 139/76 (05/23/23 1023)    Primary Survey:   Airway:        Status: patent;        Pre-hospital Interventions: none        Hospital Interventions: none  Breathing:        Pre-hospital Interventions: none       Effort: normal       Right breath sounds: normal       Left breath sounds: normal  Circulation:        Rhythm: regular Rate: regular   Right Pulses Left Pulses    R radial: 2+  R femoral: 2+  R pedal: 2+  R carotid: 2+  R popliteal: 2+ L radial: 2+  L femoral: 2+  L pedal: 2+  L carotid: 2+  L popliteal: 2+   Disability:        GCS: Eye: 4; Verbal: 4 Motor: 6 Total: 14       Right Pupil: round;  reactive         Left Pupil:  round;  reactive      R Motor Strength L Motor Strength    R : 5/5  R dorsiflex: 5/5  R plantarflex: 5/5 L : 5/5  L dorsiflex: 5/5  L plantarflex: 5/5        Sensory:  No sensory deficit  Exposure:       Completed: Yes      Secondary Survey:  Physical Exam  Constitutional:       Comments: + agitated/combative   HENT:      Head: Normocephalic  Nose: Nose normal       Mouth/Throat:      Mouth: Mucous membranes are moist    Eyes:      Pupils: Pupils are equal, round, and reactive to light  Cardiovascular:      Rate and Rhythm: Normal rate and regular rhythm  Pulses: Normal pulses  Pulmonary:      Effort: Pulmonary effort is normal  No respiratory distress  Breath sounds: Normal breath sounds  Abdominal:      General: Abdomen is flat  Palpations: Abdomen is soft  Tenderness: There is no abdominal tenderness  There is no guarding  Musculoskeletal:         General: No swelling, deformity or signs of injury  Normal range of motion  Cervical back: Normal range of motion and neck supple  No tenderness  Skin:     General: Skin is warm and dry  Capillary Refill: Capillary refill takes less than 2 seconds  Neurological:      General: No focal deficit present  Mental Status: She is alert  Sensory: No sensory deficit  Motor: No weakness        Comments: + agitated and combative  + GCS 14 (4,4 ,6),non-focal   Psychiatric:      Comments: + agitated/combative         Invasive Devices     Peripheral Intravenous Line  Duration           Peripheral IV 05/23/23 Left Antecubital <1 day              Lab Results: Results: I have personally reviewed all pertinent laboratory/tests results    Imaging Results: I have personally reviewed pertinent reports  CXR: neg  TRAUMA - CT head wo contrast    Result Date: 5/23/2023  Impression: Small right frontal scalp hematoma, however no acute intracranial hemorrhage or calvarial fracture  Area of decreased attenuation in the left corona radiata, which may represent a subacute infarct versus asymmetric chronic microvascular ischemic changes  Complete opacification of the right maxillary sinus, which suggests sinusitis  I personally discussed this study with Dr Imtiaz Houston on 5/23/2023 at 11:36 AM  Workstation performed: [id number]     TRAUMA - CT spine cervical wo contrast    Result Date: 5/23/2023  Impression: Somewhat motion degraded study  No definite cervical spine fracture or traumatic malalignment  I personally discussed this study with Dr Imtiaz Houston on 5/23/2023 at 11:36 AM  Workstation performed: [id number]     TRAUMA - CT chest abdomen pelvis w contrast    Result Date: 5/23/2023  Impression: No acute thoracic, abdominal or pelvic trauma  Cardiomegaly with biatrial enlargement  Mild bilateral pulmonary edema  Small bilateral pleural effusions  Mild mediastinal lymphadenopathy, likely reactive  4 7 x 4 2 cm fusiform infrarenal abdominal aortic aneurysm and 2nd 4 5 x 3 8 cm fusiform aneurysm just distally, both with either partial thrombosis or large areas of noncalcified atherosclerotic plaque  Additional areas of irregular noncalcified atherosclerotic plaque seen in the abdominal aorta more proximally and in the descending thoracic aorta  Additional findings as above  I personally discussed this study with Dr Imtiaz Houston on 5/23/2023 at 11:36 AM  Workstation performed: [id number]     XR Trauma multiple ([facility] trauma bay ONLY)    Result Date: 5/23/2023  Impression: Blunting of right costophrenic angle, likely secondary to a small right pleural effusion  No other acute pulmonary pathology   No obvious displaced fractures within limitation of supine AP chest technique  Workstation performed: [id number]       Other Studies: no new    Code Status: No Order  Advance Directive and Living Will:      Power of :    POLST:    I have spent 45 minutes with Patient and family today in which greater than 50% of this time was spent in counseling/coordination of care regarding Diagnostic results, Prognosis, Impressions, Counseling / Coordination of care, Documenting in the medical record, Reviewing / ordering tests, medicine, procedures   and Obtaining or reviewing history

## 2023-05-23 NOTE — CASE MANAGEMENT
Case Management Progress Note    Patient name Courtney Do  Location ED-05/ED-05 MRN [id number]  : 1945 Date 2023       LOS (days): 0  Geometric Mean LOS (GMLOS) (days):   Days to GMLOS:        OBJECTIVE:    Current admission status: Emergency  Preferred Pharmacy: No Pharmacies Listed  Primary Care Provider: Marco Antonio Sanders MD    Primary Insurance: MEDICARE  Secondary Insurance: COMMERCIAL MISCELLANEOUS    PROGRESS NOTE:      CM responded to trauma alert  CM spoke with pt daughter, Rigo Heck, who is aware pt was brought to   As per Rigo Heck the nursing home did notify her  [facility] aware that trauma will contact her with an update  Trauma notified via TT to contact Rigo Heck  CM will follow and update screening, assessment, and discharge planning as appropriate

## 2023-09-15 ENCOUNTER — HOME CARE VISIT (OUTPATIENT)
Dept: HOME HEALTH SERVICES | Facility: HOME HEALTHCARE | Age: 78
End: 2023-09-15
Payer: MEDICARE

## 2023-09-15 NOTE — Clinical Note
Hello, hospice referral for Zulema Bending 67 yo female  1945. Pt with a dementia diagnosis and has had a poor po intake and losing weigh  wt 198 current weigh 161. Her meals are 0-50 she has poor safety awareness and frequent falls. She also has dx of chronic systolic with ckd stage 3 hf current creat is 2.57. Daughter does not want any aggressive treatment and is requesting hospice care rloc ?

## 2023-09-16 ENCOUNTER — HOME CARE VISIT (OUTPATIENT)
Dept: HOME HOSPICE | Facility: HOSPICE | Age: 78
End: 2023-09-16
Payer: MEDICARE

## 2023-09-16 ENCOUNTER — HOME CARE VISIT (OUTPATIENT)
Dept: HOME HEALTH SERVICES | Facility: HOME HEALTHCARE | Age: 78
End: 2023-09-16
Payer: MEDICARE

## 2023-09-16 VITALS
BODY MASS INDEX: 31.15 KG/M2 | HEIGHT: 67 IN | HEART RATE: 60 BPM | DIASTOLIC BLOOD PRESSURE: 80 MMHG | SYSTOLIC BLOOD PRESSURE: 118 MMHG | RESPIRATION RATE: 18 BRPM

## 2023-09-16 DIAGNOSIS — Z51.5 HOSPICE CARE PATIENT: Primary | ICD-10-CM

## 2023-09-16 PROCEDURE — G0299 HHS/HOSPICE OF RN EA 15 MIN: HCPCS

## 2023-09-16 RX ORDER — LORAZEPAM 0.5 MG/1
TABLET ORAL
Qty: 60 TABLET | Refills: 0 | Status: SHIPPED | OUTPATIENT
Start: 2023-09-16 | End: 2023-09-23

## 2023-09-16 RX ORDER — OXYCODONE HYDROCHLORIDE 5 MG/1
2.5 TABLET ORAL EVERY 4 HOURS PRN
Qty: 30 TABLET | Refills: 0 | Status: SHIPPED | OUTPATIENT
Start: 2023-09-16 | End: 2023-09-23

## 2023-09-28 ENCOUNTER — HOME CARE VISIT (OUTPATIENT)
Dept: HOME HOSPICE | Facility: HOSPICE | Age: 78
End: 2023-09-28
Payer: MEDICARE
